# Patient Record
Sex: FEMALE | Race: WHITE | NOT HISPANIC OR LATINO | Employment: UNEMPLOYED | URBAN - METROPOLITAN AREA
[De-identification: names, ages, dates, MRNs, and addresses within clinical notes are randomized per-mention and may not be internally consistent; named-entity substitution may affect disease eponyms.]

---

## 2017-01-03 ENCOUNTER — GENERIC CONVERSION - ENCOUNTER (OUTPATIENT)
Dept: OTHER | Facility: OTHER | Age: 77
End: 2017-01-03

## 2017-01-04 ENCOUNTER — ALLSCRIPTS OFFICE VISIT (OUTPATIENT)
Dept: OTHER | Facility: OTHER | Age: 77
End: 2017-01-04

## 2017-01-12 ENCOUNTER — GENERIC CONVERSION - ENCOUNTER (OUTPATIENT)
Dept: OTHER | Facility: OTHER | Age: 77
End: 2017-01-12

## 2017-01-12 DIAGNOSIS — I77.1 STRICTURE OF ARTERY (HCC): ICD-10-CM

## 2017-01-12 DIAGNOSIS — E11.9 TYPE 2 DIABETES MELLITUS WITHOUT COMPLICATIONS (HCC): ICD-10-CM

## 2017-01-12 DIAGNOSIS — I65.23 OCCLUSION AND STENOSIS OF BILATERAL CAROTID ARTERIES: ICD-10-CM

## 2017-01-23 ENCOUNTER — TRANSCRIBE ORDERS (OUTPATIENT)
Dept: RADIOLOGY | Facility: HOSPITAL | Age: 77
End: 2017-01-23

## 2017-01-23 ENCOUNTER — APPOINTMENT (OUTPATIENT)
Dept: PREADMISSION TESTING | Facility: HOSPITAL | Age: 77
DRG: 038 | End: 2017-01-23
Payer: COMMERCIAL

## 2017-01-23 ENCOUNTER — TRANSCRIBE ORDERS (OUTPATIENT)
Dept: LAB | Facility: HOSPITAL | Age: 77
End: 2017-01-23

## 2017-01-23 ENCOUNTER — HOSPITAL ENCOUNTER (OUTPATIENT)
Dept: RADIOLOGY | Facility: HOSPITAL | Age: 77
Discharge: HOME | End: 2017-01-23
Attending: SURGERY
Payer: COMMERCIAL

## 2017-01-23 DIAGNOSIS — I65.23 OCCLUSION AND STENOSIS OF BILATERAL CAROTID ARTERIES: ICD-10-CM

## 2017-01-23 DIAGNOSIS — I65.23 BILATERAL CAROTID ARTERY OCCLUSION: Primary | ICD-10-CM

## 2017-01-23 DIAGNOSIS — I65.23 BILATERAL CAROTID ARTERY OCCLUSION: ICD-10-CM

## 2017-01-23 DIAGNOSIS — I77.1 STRICTURE OF ARTERY (HCC): ICD-10-CM

## 2017-01-23 DIAGNOSIS — E11.9 TYPE 2 DIABETES MELLITUS WITHOUT COMPLICATIONS (HCC): ICD-10-CM

## 2017-01-23 LAB
ALBUMIN SERPL BCP-MCNC: 3.5 G/DL (ref 3.5–5)
ALP SERPL-CCNC: 91 U/L (ref 46–116)
ALT SERPL W P-5'-P-CCNC: 18 U/L (ref 12–78)
ANION GAP SERPL CALCULATED.3IONS-SCNC: 5 MMOL/L (ref 4–13)
AST SERPL W P-5'-P-CCNC: 9 U/L (ref 5–45)
ATRIAL RATE: 53 BPM
ATRIAL RATE: 56 BPM
BASOPHILS # BLD AUTO: 0.04 THOUSANDS/ΜL (ref 0–0.1)
BASOPHILS NFR BLD AUTO: 0 % (ref 0–1)
BILIRUB SERPL-MCNC: 0.48 MG/DL (ref 0.2–1)
BUN SERPL-MCNC: 33 MG/DL (ref 5–25)
CALCIUM SERPL-MCNC: 9 MG/DL (ref 8.3–10.1)
CHLORIDE SERPL-SCNC: 108 MMOL/L (ref 100–108)
CO2 SERPL-SCNC: 28 MMOL/L (ref 21–32)
CREAT SERPL-MCNC: 1.36 MG/DL (ref 0.6–1.3)
EOSINOPHIL # BLD AUTO: 0.4 THOUSAND/ΜL (ref 0–0.61)
EOSINOPHIL NFR BLD AUTO: 4 % (ref 0–6)
ERYTHROCYTE [DISTWIDTH] IN BLOOD BY AUTOMATED COUNT: 14.4 % (ref 11.6–15.1)
EST. AVERAGE GLUCOSE BLD GHB EST-MCNC: 163 MG/DL
GFR SERPL CREATININE-BSD FRML MDRD: 37.7 ML/MIN/1.73SQ M
GLUCOSE SERPL-MCNC: 112 MG/DL (ref 65–140)
HBA1C MFR BLD: 7.3 % (ref 4.2–6.3)
HCT VFR BLD AUTO: 35.4 % (ref 34.8–46.1)
HGB BLD-MCNC: 11.8 G/DL (ref 11.5–15.4)
INR PPP: 1.01 (ref 0.86–1.16)
LYMPHOCYTES # BLD AUTO: 2.84 THOUSANDS/ΜL (ref 0.6–4.47)
LYMPHOCYTES NFR BLD AUTO: 32 % (ref 14–44)
MCH RBC QN AUTO: 30.2 PG (ref 26.8–34.3)
MCHC RBC AUTO-ENTMCNC: 33.3 G/DL (ref 31.4–37.4)
MCV RBC AUTO: 91 FL (ref 82–98)
MONOCYTES # BLD AUTO: 0.45 THOUSAND/ΜL (ref 0.17–1.22)
MONOCYTES NFR BLD AUTO: 5 % (ref 4–12)
NEUTROPHILS # BLD AUTO: 5.28 THOUSANDS/ΜL (ref 1.85–7.62)
NEUTS SEG NFR BLD AUTO: 59 % (ref 43–75)
NRBC BLD AUTO-RTO: 0 /100 WBCS
P AXIS: 40 DEGREES
P AXIS: 45 DEGREES
PLATELET # BLD AUTO: 190 THOUSANDS/UL (ref 149–390)
PMV BLD AUTO: 10.5 FL (ref 8.9–12.7)
POTASSIUM SERPL-SCNC: 4.3 MMOL/L (ref 3.5–5.3)
PR INTERVAL: 158 MS
PR INTERVAL: 158 MS
PROT SERPL-MCNC: 7.3 G/DL (ref 6.4–8.2)
PROTHROMBIN TIME: 13.4 SECONDS (ref 12–14.3)
QRS AXIS: 39 DEGREES
QRS AXIS: 39 DEGREES
QRSD INTERVAL: 90 MS
QRSD INTERVAL: 90 MS
QT INTERVAL: 444 MS
QT INTERVAL: 454 MS
QTC INTERVAL: 416 MS
QTC INTERVAL: 438 MS
RBC # BLD AUTO: 3.91 MILLION/UL (ref 3.81–5.12)
SODIUM SERPL-SCNC: 141 MMOL/L (ref 136–145)
T WAVE AXIS: 28 DEGREES
T WAVE AXIS: 36 DEGREES
VENTRICULAR RATE: 53 BPM
VENTRICULAR RATE: 56 BPM
WBC # BLD AUTO: 9.02 THOUSAND/UL (ref 4.31–10.16)

## 2017-01-23 PROCEDURE — 71020 HB CHEST X-RAY 2VW FRONTAL&LATL: CPT

## 2017-01-23 PROCEDURE — 36415 COLL VENOUS BLD VENIPUNCTURE: CPT

## 2017-01-23 PROCEDURE — 93005 ELECTROCARDIOGRAM TRACING: CPT

## 2017-01-23 PROCEDURE — 85610 PROTHROMBIN TIME: CPT

## 2017-01-23 PROCEDURE — 83036 HEMOGLOBIN GLYCOSYLATED A1C: CPT

## 2017-01-23 PROCEDURE — 80053 COMPREHEN METABOLIC PANEL: CPT

## 2017-01-23 PROCEDURE — 85025 COMPLETE CBC W/AUTO DIFF WBC: CPT

## 2017-01-25 ENCOUNTER — ANESTHESIA EVENT (OUTPATIENT)
Dept: PERIOP | Facility: HOSPITAL | Age: 77
DRG: 038 | End: 2017-01-25
Payer: COMMERCIAL

## 2017-01-27 ENCOUNTER — GENERIC CONVERSION - ENCOUNTER (OUTPATIENT)
Dept: OTHER | Facility: OTHER | Age: 77
End: 2017-01-27

## 2017-01-27 ENCOUNTER — APPOINTMENT (INPATIENT)
Dept: RADIOLOGY | Facility: HOSPITAL | Age: 77
DRG: 038 | End: 2017-01-27
Payer: COMMERCIAL

## 2017-01-27 ENCOUNTER — ANESTHESIA (OUTPATIENT)
Dept: PERIOP | Facility: HOSPITAL | Age: 77
DRG: 038 | End: 2017-01-27
Payer: COMMERCIAL

## 2017-01-27 ENCOUNTER — HOSPITAL ENCOUNTER (OUTPATIENT)
Dept: RADIOLOGY | Facility: HOSPITAL | Age: 77
Discharge: HOME | DRG: 038 | End: 2017-01-27
Attending: SURGERY
Payer: COMMERCIAL

## 2017-01-27 ENCOUNTER — HOSPITAL ENCOUNTER (INPATIENT)
Facility: HOSPITAL | Age: 77
LOS: 5 days | Discharge: HOME | DRG: 038 | End: 2017-02-01
Attending: SURGERY | Admitting: SURGERY
Payer: COMMERCIAL

## 2017-01-27 ENCOUNTER — APPOINTMENT (OUTPATIENT)
Dept: RADIOLOGY | Facility: HOSPITAL | Age: 77
DRG: 038 | End: 2017-01-27
Payer: COMMERCIAL

## 2017-01-27 DIAGNOSIS — I25.9 CARDIAC ISCHEMIA: Primary | ICD-10-CM

## 2017-01-27 DIAGNOSIS — I65.23 BILATERAL CAROTID ARTERY OCCLUSION: ICD-10-CM

## 2017-01-27 DIAGNOSIS — I77.1 STRICTURE OF ARTERY (HCC): ICD-10-CM

## 2017-01-27 DIAGNOSIS — I65.21 STENOSIS OF RIGHT INTERNAL CAROTID ARTERY: ICD-10-CM

## 2017-01-27 LAB
ABO GROUP BLD: NORMAL
BLD GP AB SCN SERPL QL: NEGATIVE
GLUCOSE SERPL-MCNC: 132 MG/DL (ref 65–140)
GLUCOSE SERPL-MCNC: 135 MG/DL (ref 65–140)
KCT BLD-ACNC: 218 SEC (ref 89–137)
KCT BLD-ACNC: 229 SEC (ref 89–137)
KCT BLD-ACNC: 229 SEC (ref 89–137)
KCT BLD-ACNC: 246 SEC (ref 89–137)
KCT BLD-ACNC: 257 SEC (ref 89–137)
KCT BLD-ACNC: 269 SEC (ref 89–137)
RH BLD: POSITIVE
SPECIMEN SOURCE: ABNORMAL
TROPONIN I SERPL-MCNC: 0.41 NG/ML
TROPONIN I SERPL-MCNC: <0.02 NG/ML

## 2017-01-27 PROCEDURE — 82948 REAGENT STRIP/BLOOD GLUCOSE: CPT

## 2017-01-27 PROCEDURE — C1769 GUIDE WIRE: HCPCS | Performed by: SURGERY

## 2017-01-27 PROCEDURE — C1725 CATH, TRANSLUMIN NON-LASER: HCPCS | Performed by: SURGERY

## 2017-01-27 PROCEDURE — A9270 NON-COVERED ITEM OR SERVICE: HCPCS | Performed by: PHYSICIAN ASSISTANT

## 2017-01-27 PROCEDURE — 93005 ELECTROCARDIOGRAM TRACING: CPT | Performed by: ANESTHESIOLOGY

## 2017-01-27 PROCEDURE — 86850 RBC ANTIBODY SCREEN: CPT | Performed by: SURGERY

## 2017-01-27 PROCEDURE — 03HY32Z INSERTION OF MONITORING DEVICE INTO UPPER ARTERY, PERCUTANEOUS APPROACH: ICD-10-PCS | Performed by: SURGERY

## 2017-01-27 PROCEDURE — 75710 ARTERY X-RAYS ARM/LEG: CPT

## 2017-01-27 PROCEDURE — 4A133J1 MONITORING OF ARTERIAL PULSE, PERIPHERAL, PERCUTANEOUS APPROACH: ICD-10-PCS | Performed by: SURGERY

## 2017-01-27 PROCEDURE — A9270 NON-COVERED ITEM OR SERVICE: HCPCS | Performed by: SURGERY

## 2017-01-27 PROCEDURE — 71010 HB CHEST X-RAY 1 VIEW FRONTAL (PORTABLE): CPT

## 2017-01-27 PROCEDURE — 03720D6: ICD-10-PCS | Performed by: SURGERY

## 2017-01-27 PROCEDURE — 86900 BLOOD TYPING SEROLOGIC ABO: CPT | Performed by: SURGERY

## 2017-01-27 PROCEDURE — 93882 EXTRACRANIAL UNI/LTD STUDY: CPT

## 2017-01-27 PROCEDURE — 37236 OPEN/PERQ PLACE STENT 1ST: CPT

## 2017-01-27 PROCEDURE — C1781 MESH (IMPLANTABLE): HCPCS | Performed by: SURGERY

## 2017-01-27 PROCEDURE — A9270 NON-COVERED ITEM OR SERVICE: HCPCS | Performed by: ANESTHESIOLOGY

## 2017-01-27 PROCEDURE — 85347 COAGULATION TIME ACTIVATED: CPT

## 2017-01-27 PROCEDURE — C1894 INTRO/SHEATH, NON-LASER: HCPCS | Performed by: SURGERY

## 2017-01-27 PROCEDURE — 84484 ASSAY OF TROPONIN QUANT: CPT | Performed by: SURGERY

## 2017-01-27 PROCEDURE — C1876 STENT, NON-COA/NON-COV W/DEL: HCPCS | Performed by: SURGERY

## 2017-01-27 PROCEDURE — 86901 BLOOD TYPING SEROLOGIC RH(D): CPT | Performed by: SURGERY

## 2017-01-27 PROCEDURE — 03CH0Z6 EXTIRPATE MATTER FROM R COM CAROTID, BIFURC, OPEN: ICD-10-PCS | Performed by: SURGERY

## 2017-01-27 PROCEDURE — 03U20JZ SUPPLEMENT INNOMINATE ARTERY WITH SYNTHETIC SUBSTITUTE, OPEN APPROACH: ICD-10-PCS | Performed by: SURGERY

## 2017-01-27 PROCEDURE — 4A133B1 MONITORING OF ARTERIAL PRESSURE, PERIPHERAL, PERCUTANEOUS APPROACH: ICD-10-PCS | Performed by: SURGERY

## 2017-01-27 PROCEDURE — 36216 PLACE CATHETER IN ARTERY: CPT

## 2017-01-27 DEVICE — XENOSURE BIOLOGIC PATCH, 0.8CM X 8CM, EIFU
Type: IMPLANTABLE DEVICE | Site: CAROTID | Status: FUNCTIONAL
Brand: XENOSURE BIOLOGIC PATCH

## 2017-01-27 DEVICE — IMPLANTABLE DEVICE
Type: IMPLANTABLE DEVICE | Site: ARTERIAL | Status: FUNCTIONAL
Brand: PALMAZ GENESIS TRANSHEPATIC BILIARY STENT ON OPTA PRO .035" DELIVERY SYSTEM

## 2017-01-27 RX ORDER — ROCURONIUM BROMIDE 10 MG/ML
INJECTION, SOLUTION INTRAVENOUS AS NEEDED
Status: DISCONTINUED | OUTPATIENT
Start: 2017-01-27 | End: 2017-01-27 | Stop reason: SURG

## 2017-01-27 RX ORDER — PROPOFOL 10 MG/ML
INJECTION, EMULSION INTRAVENOUS AS NEEDED
Status: DISCONTINUED | OUTPATIENT
Start: 2017-01-27 | End: 2017-01-27 | Stop reason: SURG

## 2017-01-27 RX ORDER — ALBUMIN, HUMAN INJ 5% 5 %
12.5 SOLUTION INTRAVENOUS ONCE
Status: COMPLETED | OUTPATIENT
Start: 2017-01-27 | End: 2017-01-28

## 2017-01-27 RX ORDER — ONDANSETRON 2 MG/ML
4 INJECTION INTRAMUSCULAR; INTRAVENOUS ONCE
Status: DISCONTINUED | OUTPATIENT
Start: 2017-01-27 | End: 2017-01-27 | Stop reason: HOSPADM

## 2017-01-27 RX ORDER — SODIUM CHLORIDE 9 MG/ML
100 INJECTION, SOLUTION INTRAVENOUS CONTINUOUS
Status: DISCONTINUED | OUTPATIENT
Start: 2017-01-27 | End: 2017-01-28

## 2017-01-27 RX ORDER — MORPHINE SULFATE 2 MG/ML
2 INJECTION, SOLUTION INTRAMUSCULAR; INTRAVENOUS EVERY 4 HOURS PRN
Status: DISCONTINUED | OUTPATIENT
Start: 2017-01-27 | End: 2017-02-01 | Stop reason: HOSPADM

## 2017-01-27 RX ORDER — CHLORHEXIDINE GLUCONATE 0.12 MG/ML
15 RINSE ORAL ONCE
Status: COMPLETED | OUTPATIENT
Start: 2017-01-27 | End: 2017-01-27

## 2017-01-27 RX ORDER — PROTAMINE SULFATE 10 MG/ML
INJECTION, SOLUTION INTRAVENOUS AS NEEDED
Status: DISCONTINUED | OUTPATIENT
Start: 2017-01-27 | End: 2017-01-27 | Stop reason: SURG

## 2017-01-27 RX ORDER — EPHEDRINE SULFATE 50 MG/ML
INJECTION, SOLUTION INTRAVENOUS AS NEEDED
Status: DISCONTINUED | OUTPATIENT
Start: 2017-01-27 | End: 2017-01-27 | Stop reason: SURG

## 2017-01-27 RX ORDER — GLYCOPYRROLATE 0.2 MG/ML
INJECTION INTRAMUSCULAR; INTRAVENOUS AS NEEDED
Status: DISCONTINUED | OUTPATIENT
Start: 2017-01-27 | End: 2017-01-27 | Stop reason: SURG

## 2017-01-27 RX ORDER — BUPIVACAINE HYDROCHLORIDE AND EPINEPHRINE 5; 5 MG/ML; UG/ML
INJECTION, SOLUTION PERINEURAL AS NEEDED
Status: DISCONTINUED | OUTPATIENT
Start: 2017-01-27 | End: 2017-01-27 | Stop reason: HOSPADM

## 2017-01-27 RX ORDER — ONDANSETRON 2 MG/ML
4 INJECTION INTRAMUSCULAR; INTRAVENOUS EVERY 6 HOURS PRN
Status: DISCONTINUED | OUTPATIENT
Start: 2017-01-27 | End: 2017-02-01 | Stop reason: HOSPADM

## 2017-01-27 RX ORDER — FENTANYL CITRATE 50 UG/ML
INJECTION, SOLUTION INTRAMUSCULAR; INTRAVENOUS AS NEEDED
Status: DISCONTINUED | OUTPATIENT
Start: 2017-01-27 | End: 2017-01-27 | Stop reason: SURG

## 2017-01-27 RX ORDER — ACETAMINOPHEN 325 MG/1
650 TABLET ORAL EVERY 6 HOURS PRN
COMMUNITY

## 2017-01-27 RX ORDER — LABETALOL HYDROCHLORIDE 5 MG/ML
5 INJECTION, SOLUTION INTRAVENOUS
Status: DISCONTINUED | OUTPATIENT
Start: 2017-01-27 | End: 2017-01-31

## 2017-01-27 RX ORDER — SODIUM CHLORIDE, SODIUM LACTATE, POTASSIUM CHLORIDE, CALCIUM CHLORIDE 600; 310; 30; 20 MG/100ML; MG/100ML; MG/100ML; MG/100ML
100 INJECTION, SOLUTION INTRAVENOUS CONTINUOUS
Status: DISCONTINUED | OUTPATIENT
Start: 2017-01-27 | End: 2017-01-27

## 2017-01-27 RX ORDER — NITROGLYCERIN 0.4 MG/1
0.4 TABLET SUBLINGUAL
Status: DISCONTINUED | OUTPATIENT
Start: 2017-01-27 | End: 2017-02-01 | Stop reason: HOSPADM

## 2017-01-27 RX ORDER — OXYCODONE HYDROCHLORIDE 5 MG/1
5 TABLET ORAL EVERY 4 HOURS PRN
Status: DISCONTINUED | OUTPATIENT
Start: 2017-01-27 | End: 2017-02-01 | Stop reason: HOSPADM

## 2017-01-27 RX ORDER — LIDOCAINE HYDROCHLORIDE 10 MG/ML
INJECTION, SOLUTION INFILTRATION; PERINEURAL AS NEEDED
Status: DISCONTINUED | OUTPATIENT
Start: 2017-01-27 | End: 2017-01-27 | Stop reason: HOSPADM

## 2017-01-27 RX ORDER — FENTANYL CITRATE/PF 50 MCG/ML
25 SYRINGE (ML) INJECTION
Status: DISCONTINUED | OUTPATIENT
Start: 2017-01-27 | End: 2017-01-27 | Stop reason: HOSPADM

## 2017-01-27 RX ORDER — ASPIRIN 81 MG/1
81 TABLET, CHEWABLE ORAL DAILY
Status: DISCONTINUED | OUTPATIENT
Start: 2017-01-28 | End: 2017-02-01 | Stop reason: HOSPADM

## 2017-01-27 RX ORDER — LIDOCAINE HYDROCHLORIDE 10 MG/ML
INJECTION, SOLUTION INFILTRATION; PERINEURAL AS NEEDED
Status: DISCONTINUED | OUTPATIENT
Start: 2017-01-27 | End: 2017-01-27 | Stop reason: SURG

## 2017-01-27 RX ORDER — ONDANSETRON 2 MG/ML
INJECTION INTRAMUSCULAR; INTRAVENOUS AS NEEDED
Status: DISCONTINUED | OUTPATIENT
Start: 2017-01-27 | End: 2017-01-27 | Stop reason: SURG

## 2017-01-27 RX ORDER — ACETAMINOPHEN 325 MG/1
650 TABLET ORAL EVERY 6 HOURS PRN
Status: DISCONTINUED | OUTPATIENT
Start: 2017-01-27 | End: 2017-02-01 | Stop reason: HOSPADM

## 2017-01-27 RX ORDER — HEPARIN SODIUM 1000 [USP'U]/ML
INJECTION, SOLUTION INTRAVENOUS; SUBCUTANEOUS AS NEEDED
Status: DISCONTINUED | OUTPATIENT
Start: 2017-01-27 | End: 2017-01-27 | Stop reason: SURG

## 2017-01-27 RX ORDER — CLOPIDOGREL BISULFATE 75 MG/1
75 TABLET ORAL DAILY
Status: DISCONTINUED | OUTPATIENT
Start: 2017-01-28 | End: 2017-02-01 | Stop reason: HOSPADM

## 2017-01-27 RX ORDER — ATORVASTATIN CALCIUM 20 MG/1
20 TABLET, FILM COATED ORAL EVERY EVENING
Status: DISCONTINUED | OUTPATIENT
Start: 2017-01-27 | End: 2017-02-01 | Stop reason: HOSPADM

## 2017-01-27 RX ORDER — ASPIRIN 81 MG/1
81 TABLET, CHEWABLE ORAL DAILY
Status: DISCONTINUED | OUTPATIENT
Start: 2017-01-28 | End: 2017-01-27 | Stop reason: SDUPTHER

## 2017-01-27 RX ORDER — ASPIRIN 325 MG
325 TABLET ORAL ONCE
Status: COMPLETED | OUTPATIENT
Start: 2017-01-27 | End: 2017-01-27

## 2017-01-27 RX ORDER — METOPROLOL SUCCINATE 100 MG/1
200 TABLET, EXTENDED RELEASE ORAL EVERY MORNING
Status: DISCONTINUED | OUTPATIENT
Start: 2017-01-28 | End: 2017-02-01 | Stop reason: HOSPADM

## 2017-01-27 RX ORDER — SODIUM CHLORIDE 9 MG/ML
INJECTION, SOLUTION INTRAVENOUS CONTINUOUS PRN
Status: DISCONTINUED | OUTPATIENT
Start: 2017-01-27 | End: 2017-01-27 | Stop reason: SURG

## 2017-01-27 RX ORDER — HYDRALAZINE HYDROCHLORIDE 20 MG/ML
15 INJECTION INTRAMUSCULAR; INTRAVENOUS
Status: DISCONTINUED | OUTPATIENT
Start: 2017-01-27 | End: 2017-01-31

## 2017-01-27 RX ORDER — DOCUSATE SODIUM 100 MG/1
100 CAPSULE, LIQUID FILLED ORAL 2 TIMES DAILY
Status: DISCONTINUED | OUTPATIENT
Start: 2017-01-27 | End: 2017-02-01 | Stop reason: HOSPADM

## 2017-01-27 RX ORDER — SODIUM CHLORIDE 450 MG/100ML
75 INJECTION, SOLUTION INTRAVENOUS CONTINUOUS
Status: DISCONTINUED | OUTPATIENT
Start: 2017-01-27 | End: 2017-01-27

## 2017-01-27 RX ORDER — NITROGLYCERIN 0.4 MG/1
0.4 TABLET SUBLINGUAL
COMMUNITY

## 2017-01-27 RX ORDER — ALLOPURINOL 300 MG/1
300 TABLET ORAL DAILY
Status: DISCONTINUED | OUTPATIENT
Start: 2017-01-28 | End: 2017-02-01 | Stop reason: HOSPADM

## 2017-01-27 RX ADMIN — SODIUM CHLORIDE, SODIUM LACTATE, POTASSIUM CHLORIDE, AND CALCIUM CHLORIDE 100 ML/HR: .6; .31; .03; .02 INJECTION, SOLUTION INTRAVENOUS at 10:27

## 2017-01-27 RX ADMIN — LIDOCAINE HYDROCHLORIDE 80 ML: 10 INJECTION, SOLUTION INFILTRATION; PERINEURAL at 11:52

## 2017-01-27 RX ADMIN — HEPARIN SODIUM 2000 UNITS: 1000 INJECTION INTRAVENOUS; SUBCUTANEOUS at 13:35

## 2017-01-27 RX ADMIN — SODIUM CHLORIDE 100 ML/HR: 0.9 INJECTION, SOLUTION INTRAVENOUS at 18:47

## 2017-01-27 RX ADMIN — EPHEDRINE SULFATE 10 MG: 50 INJECTION, SOLUTION INTRAMUSCULAR; INTRAVENOUS; SUBCUTANEOUS at 12:10

## 2017-01-27 RX ADMIN — NOREPINEPHRINE BITARTRATE 4 MCG/KG/MIN: 1 INJECTION, SOLUTION, CONCENTRATE INTRAVENOUS at 13:33

## 2017-01-27 RX ADMIN — OXYCODONE HYDROCHLORIDE 5 MG: 5 TABLET ORAL at 22:57

## 2017-01-27 RX ADMIN — FENTANYL CITRATE 50 MCG: 50 INJECTION, SOLUTION INTRAMUSCULAR; INTRAVENOUS at 13:00

## 2017-01-27 RX ADMIN — ONDANSETRON 4 MG: 2 INJECTION INTRAMUSCULAR; INTRAVENOUS at 15:36

## 2017-01-27 RX ADMIN — CEFAZOLIN SODIUM 2000 MG: 2 SOLUTION INTRAVENOUS at 12:40

## 2017-01-27 RX ADMIN — ATORVASTATIN CALCIUM 20 MG: 20 TABLET, FILM COATED ORAL at 20:54

## 2017-01-27 RX ADMIN — PROPOFOL 170 MG: 10 INJECTION, EMULSION INTRAVENOUS at 11:53

## 2017-01-27 RX ADMIN — DOCUSATE SODIUM 100 MG: 100 CAPSULE, LIQUID FILLED ORAL at 20:54

## 2017-01-27 RX ADMIN — HEPARIN SODIUM 5000 UNITS: 1000 INJECTION INTRAVENOUS; SUBCUTANEOUS at 13:11

## 2017-01-27 RX ADMIN — HEPARIN SODIUM 2000 UNITS: 1000 INJECTION INTRAVENOUS; SUBCUTANEOUS at 14:13

## 2017-01-27 RX ADMIN — HYDROMORPHONE HYDROCHLORIDE 0.4 MG: 1 INJECTION, SOLUTION INTRAMUSCULAR; INTRAVENOUS; SUBCUTANEOUS at 17:55

## 2017-01-27 RX ADMIN — GLYCOPYRROLATE 0.8 MG: 0.2 INJECTION INTRAMUSCULAR; INTRAVENOUS at 15:48

## 2017-01-27 RX ADMIN — HEPARIN SODIUM 2000 UNITS: 1000 INJECTION INTRAVENOUS; SUBCUTANEOUS at 13:22

## 2017-01-27 RX ADMIN — CHLORHEXIDINE GLUCONATE 15 ML: 1.2 RINSE ORAL at 09:45

## 2017-01-27 RX ADMIN — HEPARIN SODIUM 2000 UNITS: 1000 INJECTION INTRAVENOUS; SUBCUTANEOUS at 14:00

## 2017-01-27 RX ADMIN — SODIUM CHLORIDE: 0.9 INJECTION, SOLUTION INTRAVENOUS at 12:10

## 2017-01-27 RX ADMIN — ASPIRIN 325 MG: 325 TABLET ORAL at 18:20

## 2017-01-27 RX ADMIN — ALBUMIN HUMAN 12.5 G: 0.05 INJECTION, SOLUTION INTRAVENOUS at 16:30

## 2017-01-27 RX ADMIN — HYDROMORPHONE HYDROCHLORIDE 0.4 MG: 1 INJECTION, SOLUTION INTRAMUSCULAR; INTRAVENOUS; SUBCUTANEOUS at 18:45

## 2017-01-27 RX ADMIN — SODIUM CHLORIDE, SODIUM LACTATE, POTASSIUM CHLORIDE, AND CALCIUM CHLORIDE: .6; .31; .03; .02 INJECTION, SOLUTION INTRAVENOUS at 13:46

## 2017-01-27 RX ADMIN — HEPARIN SODIUM 2000 UNITS: 1000 INJECTION INTRAVENOUS; SUBCUTANEOUS at 13:48

## 2017-01-27 RX ADMIN — NEOSTIGMINE METHYLSULFATE 4 MG: 1 INJECTION INTRAMUSCULAR; INTRAVENOUS; SUBCUTANEOUS at 15:48

## 2017-01-27 RX ADMIN — FENTANYL CITRATE 50 MCG: 50 INJECTION, SOLUTION INTRAMUSCULAR; INTRAVENOUS at 15:41

## 2017-01-27 RX ADMIN — ROCURONIUM BROMIDE 70 MG: 10 INJECTION, SOLUTION INTRAVENOUS at 11:54

## 2017-01-27 RX ADMIN — SODIUM CHLORIDE 100 ML/HR: 0.9 INJECTION, SOLUTION INTRAVENOUS at 20:36

## 2017-01-27 RX ADMIN — ROCURONIUM BROMIDE 20 MG: 10 INJECTION, SOLUTION INTRAVENOUS at 14:37

## 2017-01-27 RX ADMIN — CEFAZOLIN SODIUM 1000 MG: 1 SOLUTION INTRAVENOUS at 22:49

## 2017-01-27 RX ADMIN — PROTAMINE SULFATE 75 MG: 10 INJECTION, SOLUTION INTRAVENOUS at 15:26

## 2017-01-27 RX ADMIN — FENTANYL CITRATE 50 MCG: 50 INJECTION, SOLUTION INTRAMUSCULAR; INTRAVENOUS at 11:51

## 2017-01-27 RX ADMIN — ALBUMIN HUMAN 12.5 G: 0.05 INJECTION, SOLUTION INTRAVENOUS at 16:50

## 2017-01-27 NOTE — DISCHARGE INSTRUCTIONS
DISCHARGE INSTRUCTIONS                       CAROTID ENDARTERECTOMY  Following discharge from the hospital, you may have some questions about your operation, your activities or your general condition  These instructions may answer some of your questions and help you adjust during the first few weeks following your operation  ACTIVITY: Resume your normal activities and exercise schedule as tolerated  If you were driving prior to surgery, you may resume driving one week following discharge from the hospital  You may ride in a car upon discharge  DIET: Resume your normal diet  Try to eat low fat and low cholesterol foods  RECURRENT SYMPTOMS: If you develop any new numbness, weakness, blindness or difficulty with speech after discharge call 911 or go to an emergency department immediately  INCISION: Your surgeon may have chosen to use a type of adhesive glue to close your incision  The glue is used to cover the incision, assist in closure, and prevent contamination  This adhesive will darken and peel away on its own within one to two weeks  You may shower the day after your surgery, but do not scrub the incision  If you do not have this adhesive glue, you may include the operated area in a shower on the third day following surgery  It is normal to have swelling or discoloration around the incision  If increasing redness or pain develops, call our office immediately  Numbness in the region of the incision may occur following the surgery  This normally resolves in six to twelve months  FOLLOW UP STUDIES: Doppler ultrasound studies are very important to your post-operative care  Your surgeon will arrange them at your first postoperative visit  The first study is due 3 months after surgery        Jacqui [address]: 2/9/17 at 3:45pm, Phyllis Mckeon office  [address], [address] [place] [address]    [phone] Angi Stone [address] [place] [address] , [address], [place], [address]  [address], [place], [address]  [address], [place], [address]  611 [facility], [facility], [address] [facility] [address]  [address]  [address] Lak[address] 62, 1st  Floor, Jacque Allred 34  [facility] 19, [address] [facility], [address], [facility] 77, [facility] 97   1201 [facility], 8614 [facility], [place], [address]  One [facility], 532 [facility], [facility],[address], Sergio Kern 6

## 2017-01-27 NOTE — IP AVS SNAPSHOT
[address]  [place], 123  Arelis Shelby  [phone]           AFTER VISIT SUMMARY             Carlene Gilman   67 yrs Female (1940)    MRN:  [id number]            Medications    It is important that you maintain an up-to-date list of medications (prescribed and over-the-counter, as well as vitamins and mineral supplements) that you are taking  Bring your list of current medications whenever you seek treatment at a hospital or other healthcare setting  If you have any questions or concerns, contact your primary care physician's office                     Your Medications      START taking these medications     aspirin 81 mg chewable tablet   Chew 1 tablet daily for 30 days   Last time this was given:  81 mg on 2/1/2017  8:35 AM   Refills:  0   Next Dose Due:  02/02/2017 AM              CONTINUE taking these medications     acetaminophen 325 mg tablet   Take 650 mg by mouth every 6 (six) hours as needed for mild pain   Last time this was given:  650 mg on 1/30/2017 10:06 PM   Refills:  0   Commonly known as:  TYLENOL   Next Dose Due:  As needed for pain            allopurinol 300 mg tablet   Take 300 mg by mouth daily   Last time this was given:  300 mg on 2/1/2017  8:35 AM   Refills:  0   Commonly known as:  ZYLOPRIM   Next Dose Due:  02/02/2017 AM            atorvastatin 20 mg tablet   Take 20 mg by mouth every evening   Last time this was given:  20 mg on 1/31/2017  5:07 PM   Refills:  0   Commonly known as:  LIPITOR   Next Dose Due:  02/01/2017 evening            clopidogrel 75 mg tablet   Take 75 mg by mouth daily   Last time this was given:  75 mg on 2/1/2017  8:34 AM   Refills:  0   Commonly known as:  PLAVIX   Next Dose Due:  02/02/2017 AM            metoprolol succinate 200 MG 24 hr tablet   Take 200 mg by mouth every morning   Last time this was given:  200 mg on 2/1/2017  8:34 AM   Refills:  0   Commonly known as:  TOPROL-XL   Next Dose Due: 02/02/2017 AM            nitroglycerin 0 4 mg SL tablet   Place 0 4 mg under the tongue every 5 (five) minutes as needed for chest pain   Refills:  0   Commonly known as:  NITROSTAT   Next Dose Due:  As needed for chest pain              STOP taking these medications     lisinopril-hydrochlorothiazide 20-25 MG per tablet   Commonly known as:  PRINZIDSIOMARAZESTORETIC                Where to Get Your Medications      You can get these medications from any pharmacy     You don't need a prescription for these medications     aspirin 81 mg chewable tablet                  Your Medications      Your Medication List           Morning Noon Evening Bedtime As Needed    acetaminophen 325 mg tablet   Take 650 mg by mouth every 6 (six) hours as needed for mild pain   Last time this was given:  650 mg on 1/30/2017 10:06 PM   Commonly known as:  TYLENOL   Next Dose Due:  As needed for pain                                    allopurinol 300 mg tablet   Take 300 mg by mouth daily   Last time this was given:  300 mg on 2/1/2017  8:35 AM   Commonly known as:  ZYLOPRIM   Next Dose Due:  02/02/2017 AM                                    aspirin 81 mg chewable tablet   Chew 1 tablet daily for 30 days   Last time this was given:  81 mg on 2/1/2017  8:35 AM   Next Dose Due:  02/02/2017 AM                                    atorvastatin 20 mg tablet   Take 20 mg by mouth every evening   Last time this was given:  20 mg on 1/31/2017  5:07 PM   Commonly known as:  LIPITOR   Next Dose Due:  02/01/2017 evening                                    clopidogrel 75 mg tablet   Take 75 mg by mouth daily   Last time this was given:  75 mg on 2/1/2017  8:34 AM   Commonly known as:  PLAVIX   Next Dose Due:  02/02/2017 AM                                    metoprolol succinate 200 MG 24 hr tablet   Take 200 mg by mouth every morning   Last time this was given:  200 mg on 2/1/2017  8:34 AM   Commonly known as:  TOPROL-XL   Next Dose Due:  02/02/2017 AM nitroglycerin 0 4 mg SL tablet   Place 0 4 mg under the tongue every 5 (five) minutes as needed for chest pain   Commonly known as:  NITROSTAT   Next Dose Due:  As needed for chest pain                                             Summary of Your Hospitalization        About your hospitalization     You were [facility] on:  January 27, 2017 [facility]    You were discharged on:  February 1, 2017 Unit phone number:  [phone]      Reason for Hospitalization     Your primary diagnosis was:  Carotid Stenosis, Right    Your diagnoses also included:   Innominate Artery Stenosis, Demand Ischemia Of Myocardium, Hyperlipidemia, Hypertension, Pad (Peripheral Artery Disease), Ckd (Chronic Kidney Disease) Stage 3, Gfr 30-59 Ml/Min, Coronary Artery Disease, Non-St Elevation Myocardial Infarction (Nstemi), Type 2, New Onset A-Fib      Treatment Team     Provider Service Role Specialty Primary office phone    Lizzeth Dawson MD Vascular Surgery Attending Provider Vascular Surgery [phone]    Lizzeth Dawson MD Vascular Surgery Surgeon  Vascular Surgery [phone]    Maranda Barber DO Cardiology Consulting Physician  Cardiology [phone]    Yessica Perez DO Anesthesiology Consulting Physician  Anesthesiology [phone]    [address] Cardiology Cardiologist  Cardiology [phone]    Seng Carnes -- Patient Care Assistant  Medical Surgical Number not on file    Alfred Christina RN -- Registered Nurse  Medical Surgical Number not on file      Procedures This Visit      Echo complete with contrast if indicated  1 time imaging     Question:  Reason for exam?  Answer:  Cardiac Murmur        ECG 12 lead  Once     Question:  REASON FOR EXAM:  Answer:  nstemi        Initiate Respiratory Protocol  Continuous,   Status:  Canceled          ECG 12 lead  Once     Question:  REASON FOR EXAM:  Answer:  chest pressure        EKG RESULTS Surgery Information     ID Date/Time Status All Surgeons All Procedures Location          [id number] 2017 7700 MD Darryl Chiang PA-C Shelia Quail, MD CAROTID ENDARTERECTOMY WITH RETROGRADE AND INNOMINATE ARTERY STENTING [77746 (CPT®)] BE MAIN OR        Last Resulted Labs     Date/Time Component Value Lab Status    17 0603 WBC 6 54 Final result    17 0603 HGB 9 1 Final result    17 0603 HCT 28 2 Final result    17 0603  Final result    17 0603  Final result    17 0603 K 3 8 Final result    17 0603  Final result    17 0603 CO2 26 Final result    17 0603 BUN 26 Final result    17 0603 CREATININE 1 03 Final result    17 0603 GLUCOSE 114 Final result    17 0940 ALKPHOS 91 Final result    17 0940 ALT 18 Final result    17 0940 AST 9 Final result    17 0940 ALBUMIN 3 5 Final result    17 0940 BILITOT 0 48 Final result    17 0940 HGBA1C 7 3 Final result    17 0433 TROPONINI 1 42 Final result    17 0940 INR 1 01 Final result      Imaging Results     Xr Chest Portable    Result Date: 2017  Impression: Diminished lung volumes  Cardiomegaly and pulmonary edema  Limited evaluation of left lung base  Underlying consolidation/effusion is not excluded  Follow-up PA and lateral exam recommended when possible  Workstation performed: [id number]     Ir Upper Extremity / Intervention    Result Date: 2017  Impression: Impression: 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 successfully treated with 8mm stent placement using open common carotid artery approach Workstation performed: [id number]            Follow-ups for After Discharge        Follow-up Information     [facility]       Why:  Jacqui wilks/ Dr Miller Gettin17 at 3:45pm, [facility] office  [address] [place] [address], [address] [place] [address], [address] [place] [address] Street    Contact information:    1307 [facility]  [address] [employer] [facility]  [phone]          Follow up with Oz Gomez MD      Specialty:  Internal Medicine    Why:  Call for f/u appt    Contact information:    [address] [place] [address]          Follow up with Zander Avitia MD      Specialty:  Cardiology    Why:  Call for appt to be seen in 1-2 weeks  Holter monitor in 6-8 weeks  Contact information:    [address] [facility]  [address] [employer] [place]  [phone]        Your Allergies  Date Reviewed: 1/27/2017    Allergen Reactions    Other Rash    Pt states she is allergic to tape adhesive      POLST/Adv Directive          Most Recent Value    POLST  Patient does not have POLST - would not like information    Advance Directive  Patient does not have advance directive - would not like information    Mental Health Surrogate  No      Multiplicom     To access this document and other health information online, please visit www  [employer] to login or create a patient portal account  For questions about any pending test results, you may contact the ordering physician or your primary care provider  Instructions for After Discharge        Activity Instructions     Lifting restrictions       Weight restriction of 10 lbs  No driving until       As Instructed       No strenuous exercise           Shower Daily                 Diet Instructions     Discharge Diet       Discharge Diet:  Theraputic Lifestyle Changes               Discharge Instructions                  DISCHARGE INSTRUCTIONS                       CAROTID ENDARTERECTOMY  Following discharge from the hospital, you may have some questions about your operation, your activities or your general condition  These instructions may answer some of your questions and help you adjust during the first few weeks following your operation      ACTIVITY: Resume your normal activities and exercise schedule as tolerated  If you were driving prior to surgery, you may resume driving one week following discharge from the hospital  You may ride in a car upon discharge  DIET: Resume your normal diet  Try to eat low fat and low cholesterol foods  RECURRENT SYMPTOMS: If you develop any new numbness, weakness, blindness or difficulty with speech after discharge call 911 or go to an emergency department immediately  INCISION: Your surgeon may have chosen to use a type of adhesive glue to close your incision  The glue is used to cover the incision, assist in closure, and prevent contamination  This adhesive will darken and peel away on its own within one to two weeks  You may shower the day after your surgery, but do not scrub the incision  If you do not have this adhesive glue, you may include the operated area in a shower on the third day following surgery  It is normal to have swelling or discoloration around the incision  If increasing redness or pain develops, call our office immediately  Numbness in the region of the incision may occur following the surgery  This normally resolves in six to twelve months  FOLLOW UP STUDIES: Doppler ultrasound studies are very important to your post-operative care  Your surgeon will arrange them at your first postoperative visit  The first study is due 3 months after surgery  Appt w/ Dr Hardeep Villalba: 2/9/17 at 3:45pm, [place] office  500 [facility], [address] [place] [address], [address] [place], [place] [id number] [address] [phone]  [address] [place] [address] , [address] [facility], [address]  [address], [place], [address]  [address], [place], [address]  611 [facility], [address] [facility] [address]  Anjali Lakhani 62, 1st  Floor, Jacque Allred 34  [facility] 19, [address] [facility], [facility] [address] [facility] 97   1307 [facility], 8614 [facility], [address] [place] [address]  [address] [facility] [address], 532 [facility], [facility], [address], [place] [address]      Other Instructions     Call provider for:  redness, tenderness, or signs of infection (pain, swelling, redness, odor or green/yellow discharge around incision site)           Call provider for:  severe uncontrolled pain           Call provider for: active or persistent bleeding                   Discharge Weight          Most Recent Value    Weight  91 3 kg (201 lb 4 5 oz) filed at 01/29/2017 0600      Information on Stroke     You have been diagnosed with - or have a history of - stroke or TIA during your hospitalization  Review the following to reduce your risk of stroke:  Discharge Medications  Taking your medications as prescribed is one of the most vital aspects of reducing your risk for stroke  It is important to know the names of your medications, how they work, how much to take, and when to take them  You should take your medications at the same time every day  Do not stop your prescribed medications or begin taking over-the-counter or herbal medications without first speaking with your physician  Risk Factors for Stroke  High blood pressure - High blood pressure is the most important risk factor for stroke  People who have high blood pressure have more than half the lifetime risk of having stroke compared to those who consistently have an optimal blood pressure reading of 120/80  Tobacco Use - Tobacco use doubles the risk for another stroke  Stop smoking if you smoke  High cholesterol - Cholesterol or plaque build-up in the arteries can block normal blood flow to the brain and cause a stroke and increase risk of heart disease  Maintain healthy cholesterol levels  Diabetes - People with diabetes are up to 4 times as likely to have a stroke as someone who does not have the disease  Atrial fibrillation - Atrial fibrillation increases your stroke risk 5 times, so its important to work with a doctor to control it   Eat a healthy diet  maintaining a diet low in calories, saturated and trans fats and cholesterol helps manage both obesity and healthy cholesterol levels in the blood, which also reduces risk for stroke  Physical activity - Physical activity reduces stroke risk  A recent study showed that people who exercise five or more times per week are less likely to have another stroke  Increase your physical activity  Alcohol use - Some studies say that drinking more than 2 drinks per day may increase stroke risk by 50 percent  Other studies have indicated that one alcoholic beverage a day may lower a persons risk for stroke, provided that there is no other medical reason for avoiding alcohol  Talk with a doctor about alcohol use and how it can best be controlled to prevent another stroke  Importance of follow up - Seeing your doctor regularly is important, even if you are feeling better  Regular medical care can help you stay out of the hospital in the future  Warning Signs of Stroke  Use FAST to remember warning signs of stroke: Face - Ask the person to smile  Does one side of the face droop? Arms - Ask the person to raise both arms  Does one arm drift downward? Speech - Ask the person to repeat a simple phrase  Is their speech slurred or strange? Time - If you observe any of these signs, call 9-1-1 immediately  Symptoms of Stroke  Sudden numbness or weakness of face, arm or leg - especially on one side of the body  Sudden confusion, trouble speaking or understanding  Sudden trouble seeing in one or both eyes  Sudden trouble walking, dizziness, loss of balance or coordination  Sudden severe headache with no known cause  Educational Information     Venous Thromboembolism (VTE) / Deep Vein Thrombosis (DVT) Prevention   VTE/DVT is a disease caused by blood clots that form in veins  Blood clots can be serious and common in patients with risk factors  VTE/DVT may occur after discharge  To decrease risks, take anticoagulation medicine if ordered by your doctor    Report any calf pain, swelling or tenderness and/or shortness of breath to your doctor immediately  Smoking Cessation   If you smoke, use tobacco or nicotine, and/or are exposed to second hand smoke, you are encouraged to stop to improve your health  If you need help quitting, please talk to your health care provider or call:  · Katty Wilcox ([phone])  · [facility] ([phone])   · [address] [place] [address] ([phone])      If your symptoms return or if your condition unexpectedly worsens from the time of discharge, notify your doctor or go to the nearest emergency room  If you think you are experiencing a medical emergency, call 911  Readmission Risk Score     Eyad Cain is committed to ensuring a safe discharge plan that will allow you to continue your recovery at home  Your risk for readmission has been determined to be MEDIUM      To prevent readmission, please be sure to:   See your health care provider within 1 week of discharge   Follow your discharge instructions    Take your medication as prescribed   Call your health care provider with any questions or concerns

## 2017-01-27 NOTE — PROGRESS NOTES
Called to evaluate patient after her CEA  She was experiencing heavy chest pain  EKG was done which showed ST depressions in V4, V5, V6, and I  SBP was ranging from 90 - 105  Cardiology was here to evaluate  Bedside ECHO was done which showed good wall motion and normal EF  Dilaudid was given and chest pain started to raisa  Pt given 325 ASA  She had plavix and metoprolol this AM      Plan:   Admit to ICU for monitoring  No intervention at this time  F/u serial troponins  Pain control      Tino Pinedo, DO  General Surgery PGY1

## 2017-01-27 NOTE — PERIOPERATIVE NURSING NOTE
Pt assessment remains stable/ pt denies pain/ no nausea/ pt resting quietly in bed   Waiting for critical care bed placement

## 2017-01-27 NOTE — CONSULTS
Consultation -  Cardiac Electrophysiology  Rocío Hyman 67 y o  female MRN: [id number]  Unit/Bed#: OR POOL Encounter: [id number]      Assessment:  Principal Problem:    Carotid stenosis, right  Active Problems:    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chest discomfort  CABG  Hx AVR    Plan:  Start  mg daily tonight and 81 mg starting tomorrow am   Continue plavix, continue metoprolol as outpatient dose  She does not have ST elevations and a stat echocardiogram that I performed at the bedside showed normal left ventricular ejection fraction with no wall motion abnormalities  RV function was normal   Her pain is improved with one dose of Dilaudid  Given no significant wall motion abnormality in the setting of the EKG changes would manage this with medical therapy and symptom control  Blood pressures in the systolic  range  Therefore, would not recommend addition of nitroglycerin  Stat portable chest x-ray has been ordered  Probable demand ischemia secondary to blood pressure in the range of 80-90 mmHg in setting of known coronary artery disease  She is pain-free now  Recommended try to avoid hypotension  ICU setting appropriate for this patient trend troponins  History of Present Illness   Physician Requesting Consult: Tomi Franklin MD  Reason for Consult / Principal Problem:  Chest discomfort  HPI: Rocío Hyman is a 67y o  year old female who presents with  chest discomfort post left carotid endarterectomy  She is a history of coronary artery bypass grafting her coronary anatomy was reviewed  PMHX from outpatient chart:PMH/CAD/AS/AVR: CABG, 1996, vein graft to the circumflex OM1 and OM 3 and a vein graft to the right coronary  She had unstable angina 2003  The large vein graft to the circumflex was severely narrowed and started  One week later, the mid LAD was stented  NSTEMI, February 2007   Circumflex VG again stented 2011 with unstable angina; worsening AS noted; TAVR evaluation 2012 Poor distal access with diffuse vascular disease  Circumflex patent  HUP; open aortic valve replacement, #23 magna with excellent symptomatic response, no current recurrence of angina  Echocardiogram 2016 aortic valve excellent function, normal EF  Consults    Review of Systems: negative for fever, chills, nausea, vomiting, chest pain, currently resolved  She does get short of breath with hills at home  All other 12 point review of systems negative     Historical Information   Past Medical History   Diagnosis Date    Aortic valve stenosis     Aorto-iliac disease     Carotid stenosis     Cataract of right eye 10/25/2016    Cataract, left eye 10/18/2016    Chronic systolic CHF (congestive heart failure)     CKD (chronic kidney disease) stage 3, GFR 30-59 ml/min     Coronary artery disease     Gout     Hyperlipidemia     Hypertension     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Right    PAD (peripheral artery disease)     Renal artery stenosis      Right     Past Surgical History   Procedure Laterality Date    Abdominal aortic aneurysm repair      Pr remv cataract extracap,insert lens Left 10/18/2016     Procedure: OS EXTRACTION EXTRACAPSULAR CATARACT PHACO INTRAOCULAR LENS (IOL); Surgeon: Linzie Opitz, MD;  [facility];  Service: Ophthalmology    Pr remv cataract extracap,insert lens Right 10/25/2016     Procedure: OD EXTRACTION EXTRACAPSULAR CATARACT PHACO INTRAOCULAR LENS (IOL); Surgeon: Linzie Opitz, MD;  [facility];  Service: Ophthalmology    Cataract extraction      Vascular surgery      Hysterectomy      Coronary artery bypass graft      Coronary stent placement      Cystoscopy      Aortic valve replacement       History   Alcohol Use No     History   Drug Use No     History   Smoking Status    Former Smoker   Smokeless Tobacco    Never Used     Comment: quit 20 years ago     Family History: History reviewed  No pertinent family history   mother  of myocardial infarction     Meds/Allergies   current meds:   Current Facility-Administered Medications   Medication Dose Route Frequency    [START ON 1/28/2017] aspirin chewable tablet 81 mg  81 mg Oral Daily    aspirin tablet 325 mg  325 mg Oral Once    fentaNYL (SUBLIMAZE) injection 25 mcg  25 mcg Intravenous Q5 Min PRN    HYDROmorphone (DILAUDID) 1 mg/mL injection 0 4 mg  0 4 mg Intravenous Q10 Min PRN    lactated ringers infusion  100 mL/hr Intravenous Continuous    ondansetron (ZOFRAN) injection 4 mg  4 mg Intravenous Once    sodium chloride 0 45 % infusion  75 mL/hr Intravenous Continuous     Facility-Administered Medications Ordered in Other Encounters   Medication Dose Route Frequency    iodixanol (VISIPAQUE) 320 MG/ML injection 200 mL  200 mL Intravenous Once in imaging     Allergies   Allergen Reactions    Other Rash     Pt states she is allergic to tape adhesive       Objective   Vitals: Blood pressure (!) 86/31, pulse 74, temperature 98 4 °F (36 9 °C), temperature source Temporal, resp  rate 12, height 5' 3" (1 6 m), weight 92 5 kg (204 lb), SpO2 98 %  , Body mass index is 36 14 kg/(m^2)  , Orthostatic Blood Pressures         Most Recent Value    Blood Pressure  (!)  86/31 filed at 01/27/2017 1715            Intake/Output Summary (Last 24 hours) at 01/27/17 1812  Last data filed at 01/27/17 1618   Gross per 24 hour   Intake             2800 ml   Output              100 ml   Net             2700 ml       Invasive Devices     Peripheral Intravenous Line            Peripheral IV 01/20/17 Left Forearm 7 days    Peripheral IV 01/27/17 Left Hand less than 1 day          Arterial Line            Arterial Line 01/27/17 Left Radial less than 1 day                    Physical Exam:obese female    NAD     GEN: Joycelyn Gorman appears well, alert and oriented x 3, pleasant and cooperative   HEENT: pupils equal, round, and reactive to light; extraocular muscles intact  NECK: supple, no carotid bruits   HEART: regular rhythm, normal S1 and S2, no murmurs, clicks, gallops or rubs   LUNGS: clear to auscultation bilaterally; no wheezes, rales, or rhonchi   ABDOMEN: normal bowel sounds, soft, no tenderness, no distention  EXTREMITIES: peripheral pulses normal; no clubbing, cyanosis, or edema  NEURO: no focal findings   SKIN: normal without suspicious lesions on exposed skin    Lab Results:   Admission on 01/27/2017   Component Date Value    ABO Grouping 01/27/2017 A     Rh Factor 01/27/2017 Positive     Antibody Screen 01/27/2017 Negative     POC Glucose 01/27/2017 132     POC Glucose 01/27/2017 135     Activated Clotting Time,* 01/27/2017 218*    Specimen Type 01/27/2017 ARTERIAL     Activated Clotting Time,* 01/27/2017 229*    Specimen Type 01/27/2017 VENOUS     Activated Clotting Time,* 01/27/2017 257*    Specimen Type 01/27/2017 VENOUS     Activated Clotting Time,* 01/27/2017 229*    Specimen Type 01/27/2017 VENOUS     Activated Clotting Time,* 01/27/2017 246*    Specimen Type 01/27/2017 VENOUS     Activated Clotting Time,* 01/27/2017 269*    Specimen Type 01/27/2017 VENOUS        Imaging: I have personally reviewed pertinent reports  Xr Chest Pa & Lateral    Result Date: 1/24/2017  Narrative: CHEST INDICATION:  Preop for carotid surgery  COMPARISON:  November 13, 2013 VIEWS:  Frontal and lateral projections; 2 images FINDINGS:  Sternotomy wires noted  Coronary stent identified  Calcified granuloma left lower lobe  Cardiomediastinal silhouette appears unremarkable  The lungs are clear  No pneumothorax or pleural effusion  Visualized osseous structures appear within normal limits for the patient's age  Impression: No active pulmonary disease  Workstation performed: [id number]     Ir Upper Extremity / Intervention    Result Date: 1/27/2017  Narrative:     IR UPPER EXTREMITY / INTERVENTION Indication: Right innominate stenosis and the right internal carotid artery stenosis  Procedure:  The procedure was performed in the hybrid operating room under general anesthesia  The right common carotid artery and carotid bifurcation were exposed by Dr Nicole Cooley which will be dictated under separate operative report  The distal right common carotid artery was accessed with a 21-gauge needle directed towards the aortic arch and a 5-Anguillan micropuncture dilator was placed  A Adamson wire was advanced into the innominate artery over which a 6-Anguillan sheath was placed  Imaging of the chest was performed with attention to the innominate stenosis  A Bentson wire was advanced into the ascending aorta through a 5-Anguillan Kumpe catheter  The Kumpe was removed  There was no fluoroscopy on the sheath during the previous injection and the sheath was withdrawn into the common carotid artery and selective imaging was performed to ensure no consultation with sheath placement  The sheath was readvanced over its dilator and the Bentson was exchanged for a Adamson wire  The innominate stenosis was dilated using a 5 x 20 mm balloon and repeat arteriography was performed  The sheath was advanced across the stenosis into the ascending aorta  A 8 x 19 mm Swapna stent was advanced and placed in approximately position  The sheath was withdrawn and following repeat imaging to confirm positioning, the stent was deployed in standard fashion  Completion arteriography was performed through the sheath  The remainder the procedure will be described in the operative report  Cosurgeons: Dr Nicole Cooley and Dr Yakelin Becerra  Due to the complexity of the procedure, the need to simultaneously manipulate multiple intra arterial devices, and the elevated risk of atheroemboli, proper performance of the procedure required both a vascular surgeon and an interventional radiologist to be present  Fluoroscopy time: 7 6 MINUTES Images: 110 Contrast: 25 mL Visipaque 320  Findings: There is a high-grade stenosis at the origin of the innominate artery    Following 8mm stent placement, there is no residual stenosis  The right proximal and mid common carotid artery is normal   The proximal right subclavian artery is normal   There is retrograde flow in the right vertebral artery initially which was seen to be antegrade following stent placement  Impression: Impression: 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 successfully treated with 8mm stent placement using open common carotid artery approach Workstation performed: [id number]     Vas Carotid Limited Study    Result Date: 1/27/2017  Narrative:  [facility] REPORT [facility]INICAL: Indications:  Right Carotid disease w/o CVA [I65 29]  This is an intra operative exam  Operative History 1/27/2017 Right Standard (ICA, ECA and CCA) endarterectomy 1/1/2012 Aortic valve replacement 1/1/2012 Coronary artery angioplasty with stent 11/21/1996 CABGx2 Risk Factors The patient has history of HTN, hyperlipidemia, CAD and previous smoking (quit >10yrs ago)  Clinical Left Pressure:  130/72 mm Hg  FINDINGS:  Right        PSV  EDV (cm/s)  Prox  ICA    101          16  Dist CCA     158          26  Ext Carotid  140          15     CONCLUSION:  Impression  Intra-op evaluation shows no evidence of mural thrombus, intimal flap or significant residual disease in the endarterectomized carotid arteries  SIGNATURE: Electronically Signed by: Sophie Oh on 2017-01-27 04:53:18 PM      EKG:  Sinus rhythm, T-wave inversions V4 through V6, aVL and one  These are new from preoperative EKG    Counseling / Coordination of Care  Total floor / unit time spent today  60 minutes  Greater than 50% of total time was spent with the patient and / or family counseling and / or coordination of care  A description of the counseling / coordination of care: Leobardo Singh Personally performing echo at bedside, discussing situation with surgical team and PACU his staff reviewing old records  Leobardo Singh

## 2017-01-27 NOTE — CONSULTS
[address] George 67 y o  female MRN: [id number]  Unit/Bed#: OR [place] Encounter: [id number]    Physician Requesting Consult: Jossue Mason MD    Reason for Consultation / Chief Complaint: Postoperative chest pain following R  CEA with retrograde innominate stenting    History of Present Illness:  Dionna Fisher is a 67 y o  female who presents with postoperative chest pain  She underwent R  CEA with retrograde innominate stenting with vascular surgery/IR today for bilateral carotid artery disease and had no focal deficits upon awakening  While in PACU, she experienced heavy chest pain  An EKG at the time showed ST depressions in leads I, V4, V5, V6, and cardiology was consulted  Bedside echo was WNL  Trop negative  She was given   Home plavix, lopressor, and nitroglycerin were restarted  Pain improved with dilaudid  Currently, patient denies pain  History obtained from chart review and the patient  Past Medical History:  Past Medical History   Diagnosis Date    Aortic valve stenosis     Aorto-iliac disease     Carotid stenosis     Cataract of right eye 10/25/2016    Cataract, left eye 10/18/2016    Chronic systolic CHF (congestive heart failure)     CKD (chronic kidney disease) stage 3, GFR 30-59 ml/min     Coronary artery disease     Gout     Hyperlipidemia     Hypertension     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Right    PAD (peripheral artery disease)     Renal artery stenosis      Right       Past Surgical History:  Past Surgical History   Procedure Laterality Date    Abdominal aortic aneurysm repair      Pr remv cataract extracap,insert lens Left 10/18/2016     Procedure: OS EXTRACTION EXTRACAPSULAR CATARACT PHACO INTRAOCULAR LENS (IOL); Surgeon: Moe Abebe MD;  [facility];  Service: Ophthalmology    Pr remv cataract extracap,insert lens Right 10/25/2016     Procedure: OD EXTRACTION EXTRACAPSULAR CATARACT PHACO INTRAOCULAR LENS (IOL);   Surgeon: Joan Abraham MD;  [facility];  Service: Ophthalmology    Cataract extraction      Vascular surgery      Hysterectomy      Coronary artery bypass graft      Coronary stent placement      Cystoscopy      Aortic valve replacement         Past Family History:  History reviewed  No pertinent family history  Social History:  History   Smoking Status    Former Smoker   Smokeless Tobacco    Never Used     Comment: quit 20 years ago     History   Alcohol Use No     History   Drug Use No     Marital Status:     Home Medications:   Prior to Admission medications    Medication Sig Start Date End Date Taking? Authorizing Provider   allopurinol (ZYLOPRIM) 300 mg tablet Take 300 mg by mouth daily   Yes Historical Provider, MD   atorvastatin (LIPITOR) 20 mg tablet Take 20 mg by mouth every evening   Yes Historical Provider, MD   clopidogrel (PLAVIX) 75 mg tablet Take 75 mg by mouth daily   Yes Historical Provider, MD   lisinopril-hydrochlorothiazide (PRINZIDE,ZESTORETIC) 20-25 MG per tablet Take 1 tablet by mouth every morning   Yes Historical Provider, MD   metoprolol succinate (TOPROL-XL) 200 MG 24 hr tablet Take 200 mg by mouth every morning   Yes Historical Provider, MD   acetaminophen (TYLENOL) 325 mg tablet Take 650 mg by mouth every 6 (six) hours as needed for mild pain    Historical Provider, MD   nitroglycerin (NITROSTAT) 0 4 mg SL tablet Place 0 4 mg under the tongue every 5 (five) minutes as needed for chest pain    Historical Provider, MD       Inpatient Medications:  Scheduled Meds:  [START ON 1/28/2017] aspirin 81 mg Oral Daily   ondansetron 4 mg Intravenous Once     Continuous Infusions:  lactated ringers 100 mL/hr Last Rate: 100 mL/hr (01/27/17 1027)   sodium chloride 75 mL/hr    sodium chloride 100 mL/hr Last Rate: 100 mL/hr (01/27/17 1847)     PRN Meds:    fentaNYL 25 mcg Q5 Min PRN   HYDROmorphone 0 4 mg Q10 Min PRN       Allergies:   Allergies   Allergen Reactions    Other Rash Pt states she is allergic to tape adhesive       ROS:   Review of Systems   Constitutional: Negative  HENT: Negative  Eyes: Negative  Respiratory: Negative  Cardiovascular: Negative  Gastrointestinal: Negative  Endocrine: Negative  Genitourinary: Negative  Musculoskeletal: Negative  Skin: Negative  Allergic/Immunologic: Negative  Neurological: Negative  Hematological: Negative  Psychiatric/Behavioral: Negative  Vitals:  Vitals:    17 1800 17 1815 17 1830 17 1845   BP:       Pulse: 72 70 62 58   Resp:    Temp:    99 6 °F (37 6 °C)   TempSrc:       SpO2: 99% 100% 99% 100%   Weight:       Height:         Temperature:   Temp (24hrs), Av °F (37 2 °C), Min:98 4 °F (36 9 °C), Max:99 6 °F (37 6 °C)    Current Temperature: 99 6 °F (37 6 °C)    Weights:   IBW: 52 4 kg  Body mass index is 36 14 kg/(m^2)  Hemodynamic Monitoring:  N/A     Non-Invasive/Invasive Ventilation Settings:  Respiratory    Lab Data (Last 4 hours)    None         O2/Vent Data (Last 4 hours)    None              No results found for: PHART, KAJ1EIW, PO2ART, CRE4HOZ, M8SECONC, BEART, SOURCE  SpO2: SpO2: 95 %     Physical Exam:  Physical Exam   Constitutional: She is oriented to person, place, and time  She appears well-developed and well-nourished  HENT:   Head: Normocephalic and atraumatic  Eyes:   R  Pupil 4mm, L  pupil 3 mm   Neck: Normal range of motion  Cardiovascular: Normal rate and regular rhythm  Pulmonary/Chest: Effort normal    Abdominal: Soft  She exhibits no distension  There is no tenderness  Musculoskeletal: Normal range of motion  Bilateral motor upper lower extremities grossly intact and equal    Neurological: She is alert and oriented to person, place, and time  Skin: Skin is warm and dry         Labs:    Results from last 7 days  Lab Units 17  0940   WBC Thousand/uL 9 02   HEMOGLOBIN g/dL 11 8   HEMATOCRIT % 35 4   PLATELETS Thousands/uL 190   NEUTROS PCT % 59   MONOS PCT % 5      Results from last 7 days  Lab Units 01/23/17  0940   SODIUM mmol/L 141   POTASSIUM mmol/L 4 3   CHLORIDE mmol/L 108   CO2 mmol/L 28   BUN mg/dL 33*   CREATININE mg/dL 1 36*   CALCIUM mg/dL 9 0   TOTAL PROTEIN g/dL 7 3   BILIRUBIN TOTAL mg/dL 0 48   ALK PHOS U/L 91   ALT U/L 18   AST U/L 9   GLUCOSE RANDOM mg/dL 112                Results from last 7 days  Lab Units 01/23/17  0940   INR  1 01           0  Lab Value Date/Time   TROPONINI <0 02 01/27/2017 1812       Imaging: Ir Upper Extremity / Intervention    Result Date: 1/27/2017  Impression: Impression: 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 successfully treated with 8mm stent placement using open common carotid artery approach Workstation performed: [id number]      I have personally reviewed pertinent reports  and I have personally reviewed pertinent films in PACS  EKG: ST depression in V4, V5, V6, I  This was personally reviewed by myself  Micro:  No results found for: Sonia Rapp, [facility]    ______________________________________________________________________    Assessment and Plan:     Neuro:   - hx CVA with bilateral carotid artery disease  - pain control: oxycodone 5 Q4 PRN, morphine 2 Q4 PRN  - neurochecks Q4    CV:   - postoperative chest pain with EKG changes: likely 2/2 demand ischemia per [facility]  Continue ASA/plavix  Trend troponin  Telemetry  Pain control as above  - HLD: continue lipitor    Lung:   - Incentive spirometry, pulmonary toilet    GI:   - Cardiac diet    F/E/N:   - [email or web address] postoperatively  - D/c IVF when tolerating diet    :   - elevated creatinine this AM, continue to monitor  IVF hydration    ID:   - received ancef preoperatively  Monitor for fevers  Heme:   - SCDs for DVT ppx; start SQH when OK with vascular surgery    Endo:   - Hx DM2, no oral antihyperglycemic agents at home  Continue to monitor sugars   Start SSI if hyperglycemic  Msk/Skin:   - Incisions C/D/I    Disposition: ICU level of care for cardiac and neurovascular monitoring    Counseling / Coordination of Care  Total Critical Care time spent 45 minutes excluding procedures, teaching and family updates  ______________________________________________________________________    VTE Pharmacologic Prophylaxis: Sequential compression device (Venodyne)   VTE Mechanical Prophylaxis: sequential compression device    Invasive lines and devices: Invasive Devices     Peripheral Intravenous Line            Peripheral IV 01/20/17 Left Forearm 7 days    Peripheral IV 01/27/17 Left Hand less than 1 day          Arterial Line            Arterial Line 01/27/17 Left Radial less than 1 day                Code Status: No Order  POA:    POLST:      Given critical illness, patient length of stay will require greater than two midnights  Portions of the record may have been created with voice recognition software  Occasional wrong word or "sound a like" substitutions may have occurred due to the inherent limitations of voice recognition software  Read the chart carefully and recognize, using context, where substitutions have occurred        Alfredo Javier

## 2017-01-27 NOTE — OP NOTE
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    Procedure(s) (LRB):  CAROTID ENDARTERECTOMY WITH RETROGRADE AND INNOMINATE ARTERY STENTING (Right)    Specimen(s):  * No specimens in log *    Estimated Blood Loss:   100 mL    Drains:          Anesthesia Type:   General    Operative Indications:  Occlusion and stenosis of bilateral carotid arteries [I65 23]  Stricture of artery [I77 1]  Combination of critical stenosis of the right internal carotid artery as well as the innominate artery  Asymptomatic  Operative Findings:  Greater than 90% stenosis of the innominate artery successfully treated with 8 x 18 mm Swapna stent without significant residual stenosis  High-grade severely calcified plaque of the proximal internal carotid artery with extension to3 cm into the internal carotid artery  Duplex imaging following this endarterectomy showed wide patency of the common and internal carotid arteries without significant residual stenosis  Upon awakening the patient was awake, following commands and moving all 4 extremities  Complications:   None    Procedure and Technique:  Qualified surgical resident was not available for this case  Assistance was required to aid in retraction, dissection and arterial reconstruction  Intervention radiology also was present for the stenting segment of the procedure due to their expertise  Gen  endotracheal anesthesia and arterial line monitors were placed   The neck was extended and rotated to the left  Duplex ultrasound was used to identify the carotid bifurcation  The right neck was then prepped and draped in normal sterile fashion with ChloraPrep  A transverse incision was made overlying the carotid bifurcation  Bovie cautery was used to maintain careful hemostasis  The platysma was divided  The sternal carotid mass her muscles retracted posterior laterally  The jugular vein was identified  Any crossing veins were ligated with silk ties  The common carotid artery was identified and dissected free approximate 3 cm below its bifurcation  The vagus nerve was identified and preserved throughout the dissection  5000 units of IV heparin were Mr  Repeated bolusing was performed to attain an ACT of greater than 260  Dissection was carried up the common carotid artery to the carotid bifurcation  The external carotid and superior thyroidal arteries were encircled with Vesseloops  Dissection was carried up the internal carotid artery were significant plaque is identified in the posterior aspect of the artery approximately 3 cm above the bifurcation  The vas was encircled with Vesseloops above this disease  Of note at this point the hypoglossal nerve crossed to nearly joined the vagus nerve  These nerves were preserved throughout this dissection  At this time the common carotid artery was cannulated in a retrograde fashion and a 6 [place] sheath was positioned in the innominate artery  Retrograde imaging showed a critical stenosis of approximately 90% at the origin the innominate artery  This lesion was crossed with a Bentson wire was was exchanged for a Adamson wire which was positioned in the ascending aorta  Predilatation was performed with a 5 mm balloon  The sheath was then passed beyond the stenosis and a 8 x 18 Swapna stent was positioned across the lesion  The sheath was withdrawn  An image was obtained to confirm appropriate positioning and the stent was deployed  Completion imaging showed no significant residual stenosis  Of note the common carotid artery was occluded with the vessel loop at any point in which the innominate lesion was crossed or manipulated  At this time the carotid vessels were occluded with Vesseloops and the sheath was removed  An arteriotomy was created from the common carotid artery puncture site through the stenosis in the internal carotid artery  An 8 [place] shunt was easily passed into the internal carotid artery with good backbleeding  Shunt was then passed, carotid artery with restoration shunted flow  A standard endarterectomy was then performed with a good taper point in the internal carotid artery and a good endpoint in the common carotid artery  An eversion technique was used in the external carotid artery  The artery was irrigated with heparinized saline solution and any residual debris was removed  The arteriotomy was then closed with a bovine pericardial patch secured with running 6-0 Prolene suture  Once this closure was nearly completed the shunt was removed and all vessels were backbled and flushed properly  The anastomosis was completed and flow was restored first the external carotid artery then the internal carotid artery  A small bleeding point was repaired with a Prolene suture  An intraoperative duplex was then performed which showed wide patency the common and internal carotid arteries without evidence of residual stenosis or intraluminal pathology  The wound was irrigated with saline solution  Fibrillar was placed around the arterial repair site  Some continued oozing was identified  Half of the heparin dose was reversed with 75 mg of protamine  After a few minutes of manual compression no further bleeding points were encountered  Any subcutaneous bleeding was either coagulated or clipped  The wound was closed by reapproximating the platysma with a running 3-0 Monocryl suture and a 4-0 Monocryl subcuticular stitch  Local anesthetic was injected eric-incisional tissue  A hystocril dressing was placed  The patient was then awoken from anesthesia and transferred to recovery room in stable condition         I was present for the entire procedure    Patient Disposition:  PACU      Vascular Quality Initiative - Carotid Endarterectomy     Urgency:  Elective    Anesthesia: General Type: Conventional    Side: right    Patch Type: Bovine Pericardial     If Prosthetic, Patch : Britney    Shunt: Yes- Routine    Skin Prep: Chlorhexidine    Drain: no  Heparin: yes    Protamine: yes      Dextran: no     Re-explore artery after closure: no    Total Procedure time: 175min    Monitoring:   EEG: no   Stump Pressure: no   Other: no    Completion Study:   Doppler: no   Duplex: yes   Arteriogram: no    Concomitant Procedure:   Proximal Endovascular: yes   Distal Endovascular: no   CABG: no   Other Arterial Op: no      SIGNATURE: Leia Ponce MD  DATE: January 27, 2017  TIME: 4:22 PM

## 2017-01-27 NOTE — PROGRESS NOTES
Dr Rosy Razo here/ echo completed  Slight ischemia noted/ heart function overall good with EF 60%  Orders for troponin and 325mg ASA    Chest Pain diminishing

## 2017-01-27 NOTE — OR NURSING
Attempted to reach family in the Waiting Room  No one responded when asked if waiting for [facility]

## 2017-01-27 NOTE — PROGRESS NOTES
Pt complains of diffuse chest pressure  Dr Tami Arrieta paged/ EKG done  ST segment depression noted   Paged Cardiology and dr Remy Mare

## 2017-01-27 NOTE — PROGRESS NOTES
Dr Rea Lizet here to see pt  Chest pain diminishing   Dr Barbi Cheng group made aware and are here to see pt

## 2017-01-28 PROBLEM — I24.8 DEMAND ISCHEMIA OF MYOCARDIUM (HCC): Status: ACTIVE | Noted: 2017-01-28

## 2017-01-28 PROBLEM — I24.89 DEMAND ISCHEMIA OF MYOCARDIUM: Status: ACTIVE | Noted: 2017-01-28

## 2017-01-28 LAB
ANION GAP SERPL CALCULATED.3IONS-SCNC: 7 MMOL/L (ref 4–13)
ATRIAL RATE: 76 BPM
BUN SERPL-MCNC: 28 MG/DL (ref 5–25)
CALCIUM SERPL-MCNC: 7.8 MG/DL (ref 8.3–10.1)
CHLORIDE SERPL-SCNC: 110 MMOL/L (ref 100–108)
CO2 SERPL-SCNC: 24 MMOL/L (ref 21–32)
CREAT SERPL-MCNC: 1.26 MG/DL (ref 0.6–1.3)
ERYTHROCYTE [DISTWIDTH] IN BLOOD BY AUTOMATED COUNT: 14.6 % (ref 11.6–15.1)
GFR SERPL CREATININE-BSD FRML MDRD: 41.2 ML/MIN/1.73SQ M
GLUCOSE SERPL-MCNC: 112 MG/DL (ref 65–140)
HCT VFR BLD AUTO: 28.7 % (ref 34.8–46.1)
HGB BLD-MCNC: 9.4 G/DL (ref 11.5–15.4)
MCH RBC QN AUTO: 30.1 PG (ref 26.8–34.3)
MCHC RBC AUTO-ENTMCNC: 32.8 G/DL (ref 31.4–37.4)
MCV RBC AUTO: 92 FL (ref 82–98)
P AXIS: 47 DEGREES
PLATELET # BLD AUTO: 140 THOUSANDS/UL (ref 149–390)
PMV BLD AUTO: 10.4 FL (ref 8.9–12.7)
POTASSIUM SERPL-SCNC: 4.3 MMOL/L (ref 3.5–5.3)
PR INTERVAL: 158 MS
QRS AXIS: 51 DEGREES
QRSD INTERVAL: 104 MS
QT INTERVAL: 392 MS
QTC INTERVAL: 441 MS
RBC # BLD AUTO: 3.12 MILLION/UL (ref 3.81–5.12)
SODIUM SERPL-SCNC: 141 MMOL/L (ref 136–145)
T WAVE AXIS: 21 DEGREES
TROPONIN I SERPL-MCNC: 1.58 NG/ML
TROPONIN I SERPL-MCNC: 2.15 NG/ML
TROPONIN I SERPL-MCNC: 2.39 NG/ML
TROPONIN I SERPL-MCNC: 2.72 NG/ML
TROPONIN I SERPL-MCNC: 3.68 NG/ML
VENTRICULAR RATE: 76 BPM
WBC # BLD AUTO: 9.39 THOUSAND/UL (ref 4.31–10.16)

## 2017-01-28 PROCEDURE — A9270 NON-COVERED ITEM OR SERVICE: HCPCS | Performed by: PHYSICIAN ASSISTANT

## 2017-01-28 PROCEDURE — 80048 BASIC METABOLIC PNL TOTAL CA: CPT | Performed by: PHYSICIAN ASSISTANT

## 2017-01-28 PROCEDURE — 84484 ASSAY OF TROPONIN QUANT: CPT | Performed by: SURGERY

## 2017-01-28 PROCEDURE — 85027 COMPLETE CBC AUTOMATED: CPT | Performed by: PHYSICIAN ASSISTANT

## 2017-01-28 PROCEDURE — A9270 NON-COVERED ITEM OR SERVICE: HCPCS | Performed by: SURGERY

## 2017-01-28 PROCEDURE — 93005 ELECTROCARDIOGRAM TRACING: CPT | Performed by: INTERNAL MEDICINE

## 2017-01-28 PROCEDURE — 84484 ASSAY OF TROPONIN QUANT: CPT | Performed by: PHYSICIAN ASSISTANT

## 2017-01-28 RX ORDER — HEPARIN SODIUM 5000 [USP'U]/ML
5000 INJECTION, SOLUTION INTRAVENOUS; SUBCUTANEOUS EVERY 8 HOURS SCHEDULED
Status: DISCONTINUED | OUTPATIENT
Start: 2017-01-28 | End: 2017-02-01 | Stop reason: HOSPADM

## 2017-01-28 RX ADMIN — HEPARIN SODIUM 5000 UNITS: 5000 INJECTION, SOLUTION INTRAVENOUS; SUBCUTANEOUS at 14:33

## 2017-01-28 RX ADMIN — CLOPIDOGREL BISULFATE 75 MG: 75 TABLET ORAL at 08:20

## 2017-01-28 RX ADMIN — MORPHINE SULFATE 2 MG: 2 INJECTION, SOLUTION INTRAMUSCULAR; INTRAVENOUS at 20:05

## 2017-01-28 RX ADMIN — ALLOPURINOL 300 MG: 300 TABLET ORAL at 08:20

## 2017-01-28 RX ADMIN — HEPARIN SODIUM 5000 UNITS: 5000 INJECTION, SOLUTION INTRAVENOUS; SUBCUTANEOUS at 22:13

## 2017-01-28 RX ADMIN — DOCUSATE SODIUM 100 MG: 100 CAPSULE, LIQUID FILLED ORAL at 08:20

## 2017-01-28 RX ADMIN — OXYCODONE HYDROCHLORIDE 5 MG: 5 TABLET ORAL at 04:11

## 2017-01-28 RX ADMIN — OXYCODONE HYDROCHLORIDE 5 MG: 5 TABLET ORAL at 17:32

## 2017-01-28 RX ADMIN — ACETAMINOPHEN 650 MG: 325 TABLET, FILM COATED ORAL at 20:04

## 2017-01-28 RX ADMIN — METOPROLOL SUCCINATE 200 MG: 100 TABLET, EXTENDED RELEASE ORAL at 08:24

## 2017-01-28 RX ADMIN — CEFAZOLIN SODIUM 1000 MG: 1 SOLUTION INTRAVENOUS at 06:30

## 2017-01-28 RX ADMIN — ASPIRIN 81 MG 81 MG: 81 TABLET ORAL at 08:20

## 2017-01-28 RX ADMIN — ATORVASTATIN CALCIUM 20 MG: 20 TABLET, FILM COATED ORAL at 17:27

## 2017-01-28 RX ADMIN — DOCUSATE SODIUM 100 MG: 100 CAPSULE, LIQUID FILLED ORAL at 17:28

## 2017-01-28 NOTE — SOCIAL WORK
65yo female is POD#1 right CEA  She is alert and oriented, oob chair, independent ADLS, drives  She resides in [place] with her 2 sons Pierce De Leon and Tim  Her third son is : Hany Shah at [phone]  She lives in 3 story home with 4 EMPERATRIZ and bathroom on second floor  No DME, no hx inpt rehab  Has used [facility] in the past and would use again if needed, but does not feel she needs assistance at this time  She uses [facility] on [address]  Son will transport home at discharge, possibly tomorrow  Encouraged pt to ask questions of her health care team, know how to manage her health, know her meds, know symptoms to watch for  Informed of [address] and discharge miliunge

## 2017-01-28 NOTE — PLAN OF CARE
CARDIOVASCULAR - ADULT     Maintains optimal cardiac output and hemodynamic stability Progressing        INFECTION - ADULT     Absence or prevention of progression during hospitalization Progressing        NEUROSENSORY - ADULT     Achieves stable or improved neurological status Progressing        PAIN - ADULT     Verbalizes/displays adequate comfort level or baseline comfort level Progressing        Potential for Falls     Patient will remain free of falls Progressing        SKIN/TISSUE INTEGRITY - ADULT     Skin integrity remains intact Progressing     Incision(s), wounds(s) or drain site(s) healing without S/S of infection Progressing

## 2017-01-28 NOTE — PROGRESS NOTES
Progress Note - Vascular Surgery   Lottie Judd 67 y o  female MRN: [id number]  Unit/Bed#: [facility] 414-01 Encounter: [id number]      Subjective:  Patient doing well  Noted to have severe chest pain in PACU she now thinks that this was more musculoskeletal, hwoever she was noted to also have ST depressions  Echo obtained and cardiology consult  Last night no further ACS symptoms  Vitals:  Visit Vitals    BP (!) 86/31    Pulse 80    Temp 98 4 °F (36 9 °C) (Oral)    Resp 16    Ht 5' 3" (1 6 m)    Wt 96 5 kg (212 lb 11 9 oz)    SpO2 100%    BMI 37 69 kg/m2       I/Os:  I/O last 3 completed shifts: In: 9886 [P O :720;  I V :4120; IV Piggyback:50]  Out: 710 [Urine:610; Blood:100]       Lab Results and Cultures:   CBC with diff:   Lab Results   Component Value Date    WBC 9 39 01/28/2017    HGB 9 4 (L) 01/28/2017    HCT 28 7 (L) 01/28/2017    MCV 92 01/28/2017     (L) 01/28/2017    MCH 30 1 01/28/2017    MCHC 32 8 01/28/2017    RDW 14 6 01/28/2017    MPV 10 4 01/28/2017    NRBC 0 01/23/2017   ,   BMP/CMP:  Lab Results   Component Value Date     01/28/2017    K 4 3 01/28/2017     (H) 01/28/2017    CO2 24 01/28/2017    ANIONGAP 7 01/28/2017    BUN 28 (H) 01/28/2017    CREATININE 1 26 01/28/2017    GLUCOSE 112 01/28/2017    CALCIUM 7 8 (L) 01/28/2017    AST 9 01/23/2017    ALT 18 01/23/2017    ALKPHOS 91 01/23/2017    PROT 7 3 01/23/2017    ALBUMIN 3 5 01/23/2017    BILITOT 0 48 01/23/2017    EGFR 41 2 01/28/2017   ,   Lipid Panel: No results found for: CHOL,   Coags:   Lab Results   Component Value Date    INR 1 01 01/23/2017   ,     Blood Culture: No results found for: Rajni Garcia,   Urinalysis: No results found for: Anjali William, SPECGRAV, PHUR, LEUKOCYTESUR, NITRITE, PROTEINUA, GLUCOSEU, KETONESU, BILIRUBINUR, BLOODU,   Urine Culture: No results found for: URINECX,   Wound Culure: No results found for: WOUNDCULT    Medications:  Current Facility-Administered Medications   Medication Dose Route Frequency    acetaminophen (TYLENOL) tablet 650 mg  650 mg Oral Q6H PRN    allopurinol (ZYLOPRIM) tablet 300 mg  300 mg Oral Daily    aspirin chewable tablet 81 mg  81 mg Oral Daily    atorvastatin (LIPITOR) tablet 20 mg  20 mg Oral QPM    clopidogrel (PLAVIX) tablet 75 mg  75 mg Oral Daily    docusate sodium (COLACE) capsule 100 mg  100 mg Oral BID    labetalol (NORMODYNE) injection 5 mg  5 mg Intravenous Q15 Min PRN    And    hydrALAZINE (APRESOLINE) injection 15 mg  15 mg Intravenous Q15 Min PRN    And    niCARdipine (CARDENE) 25 mg (STANDARD CONCENTRATION) in sodium chloride 0 9% 250 mL  1-15 mg/hr Intravenous Continuous PRN    metoprolol succinate (TOPROL-XL) 24 hr tablet 200 mg  200 mg Oral QAM    morphine 2 mg/mL injection 2 mg  2 mg Intravenous Q4H PRN    nitroglycerin (NITROSTAT) SL tablet 0 4 mg  0 4 mg Sublingual Q5 Min PRN    ondansetron (ZOFRAN) injection 4 mg  4 mg Intravenous Q6H PRN    oxyCODONE (ROXICODONE) IR tablet 5 mg  5 mg Oral Q4H PRN       Imaging:      Physical Exam:    Physical Exam: General: AAOx3  Respiratory: BS b/l  Neck: trachea midline, incision c/d/i,   Neurological: following all commands, tongue midline  nonfocal pupils equal b/l and reactive now   Abdomen: Soft, NT, no rebound/guarding    Heart: RRR, S1s2  Ext: Warm no cyanosis         Assessment:  66 yo F with right carotid stenosis and right innominate artery stenosis s/p right carotid endarterectomy with right retrograde innominate artery stenting     Plan:  Cardiac diet   DC IVF   DC huang   Transfer out of unit   F/U cardiolgoy given increase in troponins   Start DVT ppx this morning   PT/OT     Oleg Ricci  1/28/2017

## 2017-01-28 NOTE — PROGRESS NOTES
Patient examined post operatively  Noted to have R pupil 4m and 3mm L  nonfocal otherwise  Movement of all extremities with equal strength  Case discussed with attending on call, Dr Yandel Chatman is to observe patient for now   Continue vascular exams

## 2017-01-28 NOTE — PROGRESS NOTES
Cardiology Progress Note - Chandrika Chacon 67 y o  female MRN: [id number]    Unit/Bed#: [facility] 414-01 Encounter: [id number]      Assessment:  Principal Problem:    Carotid stenosis, right  Active Problems:    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Demand ischemia of myocardium      Plan:  Patient is comfortable this morning  She has no chest pain or significant dyspnea  He is in sinus rhythm on telemetry with a rate in the 80s  He is postop day 1 after carotid endarterectomy  Her troponin elevation is consistent with a type II non-STEMI in the setting of hypotension  She is on standard anti-ischemic therapy at this point  Vital signs are acceptable  I have made no change in her medical regimen  Subjective:   Patient seen and examined  No significant events overnight   negative  Objective:     Vitals: Blood pressure (!) 145/44, pulse 79, temperature 98 4 °F (36 9 °C), temperature source Oral, resp  rate 22, height 5' 3" (1 6 m), weight 96 5 kg (212 lb 11 9 oz), SpO2 94 %  , Body mass index is 37 69 kg/(m^2)  , Orthostatic Blood Pressures         Most Recent Value    Blood Pressure  (!)  145/44 filed at 01/28/2017 0824      ,      Intake/Output Summary (Last 24 hours) at 01/28/17 0954  Last data filed at 01/28/17 0737   Gross per 24 hour   Intake             5690 ml   Output             1083 ml   Net             4607 ml       No significant arrhythmias seen on telemetry review         Physical Exam:    GEN: Chandrika Chacon appears well, alert and oriented x 3, pleasant and cooperative   HEART: normal rate, regular rhythm, normal S1 and S2, no murmurs, clicks, gallops or rubs   LUNGS: clear to auscultation bilaterally; no wheezes, rales, or rhonchi   ABDOMEN: normal bowel sounds, soft, no tenderness, no distention  EXTREMITIES: peripheral pulses normal; no clubbing, cyanosis, or edema  SKIN: warm and well perfused, no suspicious lesions on exposed skin    Labs & Results:    Admission on 01/27/2017   Component Date Value    ABO Grouping 01/27/2017 A     Rh Factor 01/27/2017 Positive     Antibody Screen 01/27/2017 Negative     POC Glucose 01/27/2017 132     POC Glucose 01/27/2017 135     Activated Clotting Time,* 01/27/2017 218*    Specimen Type 01/27/2017 ARTERIAL     Activated Clotting Time,* 01/27/2017 229*    Specimen Type 01/27/2017 VENOUS     Activated Clotting Time,* 01/27/2017 257*    Specimen Type 01/27/2017 VENOUS     Activated Clotting Time,* 01/27/2017 229*    Specimen Type 01/27/2017 VENOUS     Activated Clotting Time,* 01/27/2017 246*    Specimen Type 01/27/2017 VENOUS     Activated Clotting Time,* 01/27/2017 269*    Specimen Type 01/27/2017 VENOUS     Troponin I 01/27/2017 <0 02     Ventricular Rate 01/28/2017 76     Atrial Rate 01/28/2017 76     IL Interval 01/28/2017 158     QRSD Interval 01/28/2017 104     QT Interval 01/28/2017 392     QTC Interval 01/28/2017 441     P Axis 01/28/2017 47     QRS Axis 01/28/2017 51     T Wave Axis 01/28/2017 21     Troponin I 01/27/2017 0 41*    Troponin I 01/28/2017 1 58*    Sodium 01/28/2017 141     Potassium 01/28/2017 4 3     Chloride 01/28/2017 110*    CO2 01/28/2017 24     Anion Gap 01/28/2017 7     BUN 01/28/2017 28*    Creatinine 01/28/2017 1 26     Glucose 01/28/2017 112     Calcium 01/28/2017 7 8*    eGFR 01/28/2017 41 2     WBC 01/28/2017 9 39     RBC 01/28/2017 3 12*    Hemoglobin 01/28/2017 9 4*    Hematocrit 01/28/2017 28 7*    MCV 01/28/2017 92     MCH 01/28/2017 30 1     MCHC 01/28/2017 32 8     RDW 01/28/2017 14 6     Platelets 61/42/2445 140*    MPV 01/28/2017 10 4     Troponin I 01/28/2017 2 39*       Xr Chest Portable    Result Date: 1/27/2017  Narrative: CHEST INDICATION:  Chest pain and pressure  COMPARISON:  January 23, 2017 VIEWS:   AP frontal;  1 image FINDINGS:  Lungs are suboptimally aerated  There are interstitial and alveolar infiltrates in both lungs  Left lung base is obscured   Cardiac silhouette is enlarged  Mild vascular congestion and peribronchial thickening  Prior median sternotomy  Coronary stent  Bony structures otherwise grossly intact  Numerous EKG leads in place  Impression: Diminished lung volumes  Cardiomegaly and pulmonary edema  Limited evaluation of left lung base  Underlying consolidation/effusion is not excluded  Follow-up PA and lateral exam recommended when possible  Workstation performed: [id number]     Xr Chest Pa & Lateral    Result Date: 1/24/2017  Narrative: CHEST INDICATION:  Preop for carotid surgery  COMPARISON:  November 13, 2013 VIEWS:  Frontal and lateral projections; 2 images FINDINGS:  Sternotomy wires noted  Coronary stent identified  Calcified granuloma left lower lobe  Cardiomediastinal silhouette appears unremarkable  The lungs are clear  No pneumothorax or pleural effusion  Visualized osseous structures appear within normal limits for the patient's age  Impression: No active pulmonary disease  Workstation performed: [id number]     Ir Upper Extremity / Intervention    Result Date: 1/27/2017  Narrative:     IR UPPER EXTREMITY / INTERVENTION Indication: Right innominate stenosis and the right internal carotid artery stenosis  Procedure: The procedure was performed in the hybrid operating room under general anesthesia  The right common carotid artery and carotid bifurcation were exposed by Dr Steph Golden which will be dictated under separate operative report  The distal right common carotid artery was accessed with a 21-gauge needle directed towards the aortic arch and a 5-Citizen of the [place] micropuncture dilator was placed  A Adamson wire was advanced into the innominate artery over which a 6-Citizen of [place] sheath was placed  Imaging of the chest was performed with attention to the innominate stenosis  A Peyton wire was advanced into the ascending aorta through a 5-Citizen of the [place] Kumpe catheter  The Kumpe was removed    There was no fluoroscopy on the sheath during the previous injection and the sheath was withdrawn into the common carotid artery and selective imaging was performed to ensure no consultation with sheath placement  The sheath was readvanced over its dilator and the Bentson was exchanged for a Adamson wire  The innominate stenosis was dilated using a 5 x 20 mm balloon and repeat arteriography was performed  The sheath was advanced across the stenosis into the ascending aorta  A 8 x 19 mm Swapna stent was advanced and placed in approximately position  The sheath was withdrawn and following repeat imaging to confirm positioning, the stent was deployed in standard fashion  Completion arteriography was performed through the sheath  The remainder the procedure will be described in the operative report  Cosurgeons: Dr Kash Yeager and Dr Juan Davis  Due to the complexity of the procedure, the need to simultaneously manipulate multiple intra arterial devices, and the elevated risk of atheroemboli, proper performance of the procedure required both a vascular surgeon and an interventional radiologist to be present  Fluoroscopy time: 7 6 MINUTES Images: 110 Contrast: 25 mL Visipaque 320  Findings: There is a high-grade stenosis at the origin of the innominate artery  Following 8mm stent placement, there is no residual stenosis  The right proximal and mid common carotid artery is normal   The proximal right subclavian artery is normal   There is retrograde flow in the right vertebral artery initially which was seen to be antegrade following stent placement  Impression: Impression: 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 successfully treated with 8mm stent placement using open common carotid artery approach Workstation performed: [id number]     Vas Carotid Limited Study    Result Date: 1/27/2017  Narrative:  [facility] REPORT CLINICAL: Indications:  Right Carotid disease w/o CVA [I65 29]   This is an intra operative exam  Operative History 1/27/2017 Right Standard (ICA, ECA and CCA) endarterectomy 1/1/2012 Aortic valve replacement 1/1/2012 Coronary artery angioplasty with stent 11/21/1996 CABGx2 Risk Factors The patient has history of HTN, hyperlipidemia, CAD and previous smoking (quit >10yrs ago)  Clinical Left Pressure:  130/72 mm Hg  FINDINGS:  Right        PSV  EDV (cm/s)  Prox  ICA    101          16  Dist CCA     158          26  Ext Carotid  140          15     CONCLUSION:  Impression  Intra-op evaluation shows no evidence of mural thrombus, intimal flap or significant residual disease in the endarterectomized carotid arteries  SIGNATURE: Electronically Signed by: Satish Reed on 2017-01-27 04:53:18 PM      EKG personally reviewed by Louis To MD      Counseling / Coordination of Care  Total floor / unit time spent today 30 minutes  Greater than 50% of total time was spent with the patient and / or family counseling and / or coordination of care

## 2017-01-28 NOTE — PROGRESS NOTES
Progress Note - Critical Care   Carolina Vo 67 y o  female MRN: [id number]  Unit/Bed#: [facility] 414-01 Encounter: [id number]    Assessment:   Patient Active Problem List   Diagnosis    Cataract, left eye    Cataract of right eye    Carotid stenosis, right    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 Demand ischemia of myocardium         Plan: Critical Care Management as outlined below:     Neuro: Awake and alert  No focal neurologic deficits  Minimal anasocoria with 4 mm right pupil and 3 mm left pupil, both equally reactive  Required to p r n  doses of 5 mg oxycodone overnight for pain  States pain well controlled with medications  CV: Status post right CEA with retrograde innominate stenting, postop day #1  Episode of chest pain postoperatively with ST depressions on EKG  Seen by cardiology  Troponins are mildly elevated and continue to rise  Likely NSTEMI type 2  Cardiology continues to follow  On aspirin and Plavix currently  Continue to monitor closely  History of coronary artery disease with previous stenting and CABG  Lung: No significant events  Doing well on incentive spirometry  No shortness of breath  Lung sounds clear  Continue aggressive pulmonary toilet  GI: Tolerating cardiac step one diet  No acute abdominal issues  FEN: 4 2 L positive since admission  No significant peripheral edema  BMP unremarkable today  Discontinue maintenance IV fluids  : Good urine output  BMP unremarkable  Continue to monitor  ID: Afebrile  No leukocytosis  Continue to monitor  Heme: Acute blood loss anemia  Hemoglobin 9 4, down from 11 8  Mild thrombocytopenia    Continue aspirin and Plavix  Continue SCDs for DVT prophylaxis  Will likely start chemical prophylaxis later today  Endo: No acute issues  Blood glucose normal  Continue to monitor  Msk/Skin: No skin breakdown  Continue frequent repositioning and offloading  Out of bed to chair       Disposition: Continue ICU management for now     ______________________________________________________________________    Chief Complaint: Right neck fullness      HPI/24hr events: 50-year-old female admitted for elective right CEA with innominate artery stenting  Had episode of chest pain postoperatively with ST depressions  Mildly elevated troponins  Currently pain-free     ______________________________________________________________________    Physical Exam:   Physical Exam   Constitutional: She is oriented to person, place, and time  Vital signs are normal  She appears well-developed  She is cooperative  Non-toxic appearance  She does not appear ill  No distress  Eyes: Conjunctivae are normal  Pupils are equal, round, and reactive to light  Neck: No JVD present  No tracheal deviation present  Cardiovascular: Normal rate, regular rhythm and normal pulses  No extrasystoles are present  Pulmonary/Chest: Effort normal and breath sounds normal  No tachypnea  No respiratory distress  Abdominal: Soft  Normal appearance  She exhibits no distension  There is no tenderness  Neurological: She is alert and oriented to person, place, and time  She has normal strength  GCS eye subscore is 4  GCS verbal subscore is 5  GCS motor subscore is 6  Skin: Skin is warm, dry and intact              ______________________________________________________________________  Vitals:    17 0200 17 0300 17 0400 17 0500   BP:       Pulse: 72 78 88 88   Resp: 19 20 20 21   Temp:   98 4 °F (36 9 °C)    TempSrc:   Oral    SpO2: 100% 100% 100% 100%   Weight:       Height:         Arterial Line BP: 120/36  Arterial Line MAP (mmHg): 62 mmHg     Temperature:   Temp (24hrs), Av 6 °F (37 °C), Min:97 8 °F (36 6 °C), Max:99 6 °F (37 6 °C)    Current Temperature: 98 4 °F (36 9 °C)  Weights:   IBW: 52 4 kg    Body mass index is 37 69 kg/(m^2)    Weight (last 2 days)     Date/Time   Weight    17 1930  96 5 (212 74)    17 0954  92 5 (204) Hemodynamic Monitoring:  N/A     Non-Invasive/Invasive Ventilation Settings:  Respiratory    Lab Data (Last 4 hours)    None         O2/Vent Data (Last 4 hours)    None              No results found for: PHART, GIX4VHW, PO2ART, TAG7RTE, K6RCFFAE, BEART, SOURCE  SpO2: SpO2: 100 %, SpO2 Activity: SpO2 Activity: At Rest, SpO2 Device: O2 Device: Nasal cannula  Intake and Outputs:  I/O       01/26 0701 - 01/27 0700 01/27 0701 - 01/28 0700    P  O   720    I V  (mL/kg)  4120 (42 7)    IV Piggyback  50    Total Intake(mL/kg)  4890 (50 7)    Urine (mL/kg/hr)  610    Blood  100    Total Output   710    Net   +4180          Unmeasured Urine Occurrence  1 x        UOP: 0 7 mL/kg/hour   Nutrition:        Diet Orders            Start     Ordered    01/27/17 1959  Diet Cardiac; Cardiac Step 1  Diet effective now     Comments:  Once fully recovered  Nursing to do bedside swallow assessment   Question Answer Comment   Diet Type Cardiac    Cardiac Cardiac Step 1    RD to adjust diet per protocol? Yes        01/27/17 1958          Labs:     Results from last 7 days  Lab Units 01/23/17  0940   WBC Thousand/uL 9 02   HEMOGLOBIN g/dL 11 8   HEMATOCRIT % 35 4   PLATELETS Thousands/uL 190   NEUTROS PCT % 59   MONOS PCT % 5      Results from last 7 days  Lab Units 01/23/17  0940   SODIUM mmol/L 141   POTASSIUM mmol/L 4 3   CHLORIDE mmol/L 108   CO2 mmol/L 28   BUN mg/dL 33*   CREATININE mg/dL 1 36*   CALCIUM mg/dL 9 0   TOTAL PROTEIN g/dL 7 3   BILIRUBIN TOTAL mg/dL 0 48   ALK PHOS U/L 91   ALT U/L 18   AST U/L 9   GLUCOSE RANDOM mg/dL 112                Results from last 7 days  Lab Units 01/23/17  0940   INR  1 01           0  Lab Value Date/Time   TROPONINI 1 58 (H) 01/28/2017 0130   TROPONINI 0 41 (H) 01/27/2017 2116   TROPONINI <0 02 01/27/2017 1812     Imaging: None today  I have personally reviewed pertinent reports     and I have personally reviewed pertinent films in PACS    Micro:  No results found for: Chip Collar, Nateleidy Barker  Allergies: Allergies   Allergen Reactions    Other Rash     Pt states she is allergic to tape adhesive     Medications:   Scheduled Meds:  allopurinol 300 mg Oral Daily   aspirin 81 mg Oral Daily   atorvastatin 20 mg Oral QPM   cefazolin 1,000 mg Intravenous Q8H   clopidogrel 75 mg Oral Daily   docusate sodium 100 mg Oral BID   metoprolol succinate 200 mg Oral QAM     Continuous Infusions:  niCARdipine 1-15 mg/hr    sodium chloride 100 mL/hr Last Rate: 100 mL/hr (01/27/17 2036)     PRN Meds:    acetaminophen 650 mg Q6H PRN   labetalol 5 mg Q15 Min PRN   And     hydrALAZINE 15 mg Q15 Min PRN   And     niCARdipine 1-15 mg/hr Continuous PRN   morphine injection 2 mg Q4H PRN   nitroglycerin 0 4 mg Q5 Min PRN   ondansetron 4 mg Q6H PRN   oxyCODONE 5 mg Q4H PRN     VTE Pharmacologic Prophylaxis: Reason for no pharmacologic prophylaxis Large doses of heparin yesterday  VTE Mechanical Prophylaxis: sequential compression device  Invasive lines and devices: Invasive Devices     Peripheral Intravenous Line            Peripheral IV 01/20/17 Left Forearm 7 days    Peripheral IV 01/27/17 Left Hand less than 1 day          Arterial Line            Arterial Line 01/27/17 Left Radial less than 1 day                   Counseling / Coordination of Care  Total Critical Care time spent 37 minutes excluding procedures, teaching and family updates  Code Status: No Order    Portions of the record may have been created with voice recognition software  Occasional wrong word or "sound a like" substitutions may have occurred due to the inherent limitations of voice recognition software  Read the chart carefully and recognize, using context, where substitutions have occurred      Kendra Garcia PA-C

## 2017-01-29 PROBLEM — I21.A1 NON-ST ELEVATION MYOCARDIAL INFARCTION (NSTEMI), TYPE 2: Status: ACTIVE | Noted: 2017-01-29

## 2017-01-29 LAB
ANION GAP SERPL CALCULATED.3IONS-SCNC: 10 MMOL/L (ref 4–13)
BASOPHILS # BLD AUTO: 0.02 THOUSANDS/ΜL (ref 0–0.1)
BASOPHILS NFR BLD AUTO: 0 % (ref 0–1)
BUN SERPL-MCNC: 26 MG/DL (ref 5–25)
CALCIUM SERPL-MCNC: 8 MG/DL (ref 8.3–10.1)
CHLORIDE SERPL-SCNC: 105 MMOL/L (ref 100–108)
CO2 SERPL-SCNC: 22 MMOL/L (ref 21–32)
CREAT SERPL-MCNC: 1.32 MG/DL (ref 0.6–1.3)
EOSINOPHIL # BLD AUTO: 0.24 THOUSAND/ΜL (ref 0–0.61)
EOSINOPHIL NFR BLD AUTO: 2 % (ref 0–6)
ERYTHROCYTE [DISTWIDTH] IN BLOOD BY AUTOMATED COUNT: 14.5 % (ref 11.6–15.1)
GFR SERPL CREATININE-BSD FRML MDRD: 39 ML/MIN/1.73SQ M
GLUCOSE SERPL-MCNC: 118 MG/DL (ref 65–140)
HCT VFR BLD AUTO: 28.9 % (ref 34.8–46.1)
HGB BLD-MCNC: 9.5 G/DL (ref 11.5–15.4)
LYMPHOCYTES # BLD AUTO: 1.85 THOUSANDS/ΜL (ref 0.6–4.47)
LYMPHOCYTES NFR BLD AUTO: 16 % (ref 14–44)
MAGNESIUM SERPL-MCNC: 1.2 MG/DL (ref 1.6–2.6)
MAGNESIUM SERPL-MCNC: 3 MG/DL (ref 1.6–2.6)
MCH RBC QN AUTO: 30.4 PG (ref 26.8–34.3)
MCHC RBC AUTO-ENTMCNC: 32.9 G/DL (ref 31.4–37.4)
MCV RBC AUTO: 93 FL (ref 82–98)
MONOCYTES # BLD AUTO: 0.93 THOUSAND/ΜL (ref 0.17–1.22)
MONOCYTES NFR BLD AUTO: 8 % (ref 4–12)
NEUTROPHILS # BLD AUTO: 8.63 THOUSANDS/ΜL (ref 1.85–7.62)
NEUTS SEG NFR BLD AUTO: 74 % (ref 43–75)
NRBC BLD AUTO-RTO: 0 /100 WBCS
PLATELET # BLD AUTO: 142 THOUSANDS/UL (ref 149–390)
PMV BLD AUTO: 10.7 FL (ref 8.9–12.7)
POTASSIUM SERPL-SCNC: 4.1 MMOL/L (ref 3.5–5.3)
RBC # BLD AUTO: 3.12 MILLION/UL (ref 3.81–5.12)
SODIUM SERPL-SCNC: 137 MMOL/L (ref 136–145)
TROPONIN I SERPL-MCNC: 1.42 NG/ML
WBC # BLD AUTO: 11.7 THOUSAND/UL (ref 4.31–10.16)

## 2017-01-29 PROCEDURE — A9270 NON-COVERED ITEM OR SERVICE: HCPCS | Performed by: PHYSICIAN ASSISTANT

## 2017-01-29 PROCEDURE — 84484 ASSAY OF TROPONIN QUANT: CPT | Performed by: PHYSICIAN ASSISTANT

## 2017-01-29 PROCEDURE — 83735 ASSAY OF MAGNESIUM: CPT | Performed by: PHYSICIAN ASSISTANT

## 2017-01-29 PROCEDURE — 85025 COMPLETE CBC W/AUTO DIFF WBC: CPT | Performed by: PHYSICIAN ASSISTANT

## 2017-01-29 PROCEDURE — 83735 ASSAY OF MAGNESIUM: CPT | Performed by: NURSE PRACTITIONER

## 2017-01-29 PROCEDURE — A9270 NON-COVERED ITEM OR SERVICE: HCPCS | Performed by: SURGERY

## 2017-01-29 PROCEDURE — 80048 BASIC METABOLIC PNL TOTAL CA: CPT | Performed by: PHYSICIAN ASSISTANT

## 2017-01-29 RX ORDER — MAGNESIUM SULFATE HEPTAHYDRATE 40 MG/ML
4 INJECTION, SOLUTION INTRAVENOUS ONCE
Status: COMPLETED | OUTPATIENT
Start: 2017-01-29 | End: 2017-01-29

## 2017-01-29 RX ORDER — MAGNESIUM SULFATE HEPTAHYDRATE 40 MG/ML
2 INJECTION, SOLUTION INTRAVENOUS ONCE
Status: COMPLETED | OUTPATIENT
Start: 2017-01-29 | End: 2017-01-29

## 2017-01-29 RX ORDER — MAGNESIUM SULFATE HEPTAHYDRATE 40 MG/ML
INJECTION, SOLUTION INTRAVENOUS
Status: COMPLETED
Start: 2017-01-29 | End: 2017-01-29

## 2017-01-29 RX ORDER — SENNOSIDES 8.6 MG
1 TABLET ORAL
Status: DISCONTINUED | OUTPATIENT
Start: 2017-01-29 | End: 2017-02-01 | Stop reason: HOSPADM

## 2017-01-29 RX ADMIN — MAGNESIUM SULFATE HEPTAHYDRATE 4 G: 40 INJECTION, SOLUTION INTRAVENOUS at 06:42

## 2017-01-29 RX ADMIN — DOCUSATE SODIUM 100 MG: 100 CAPSULE, LIQUID FILLED ORAL at 08:34

## 2017-01-29 RX ADMIN — ALLOPURINOL 300 MG: 300 TABLET ORAL at 08:34

## 2017-01-29 RX ADMIN — ASPIRIN 81 MG 81 MG: 81 TABLET ORAL at 08:34

## 2017-01-29 RX ADMIN — CLOPIDOGREL BISULFATE 75 MG: 75 TABLET ORAL at 08:34

## 2017-01-29 RX ADMIN — DOCUSATE SODIUM 100 MG: 100 CAPSULE, LIQUID FILLED ORAL at 17:58

## 2017-01-29 RX ADMIN — METOPROLOL SUCCINATE 200 MG: 100 TABLET, EXTENDED RELEASE ORAL at 08:36

## 2017-01-29 RX ADMIN — HEPARIN SODIUM 5000 UNITS: 5000 INJECTION, SOLUTION INTRAVENOUS; SUBCUTANEOUS at 06:36

## 2017-01-29 RX ADMIN — HEPARIN SODIUM 5000 UNITS: 5000 INJECTION, SOLUTION INTRAVENOUS; SUBCUTANEOUS at 21:58

## 2017-01-29 RX ADMIN — ATORVASTATIN CALCIUM 20 MG: 20 TABLET, FILM COATED ORAL at 17:58

## 2017-01-29 RX ADMIN — OXYCODONE HYDROCHLORIDE 5 MG: 5 TABLET ORAL at 04:31

## 2017-01-29 RX ADMIN — HEPARIN SODIUM 5000 UNITS: 5000 INJECTION, SOLUTION INTRAVENOUS; SUBCUTANEOUS at 14:09

## 2017-01-29 RX ADMIN — MAGNESIUM SULFATE HEPTAHYDRATE 2 G: 40 INJECTION, SOLUTION INTRAVENOUS at 15:49

## 2017-01-29 RX ADMIN — ACETAMINOPHEN 650 MG: 325 TABLET, FILM COATED ORAL at 04:31

## 2017-01-29 NOTE — CASE MANAGEMENT
Initial Surgical Review    Patient:  Leigh Macdonald  :  1940  MRN:  [id number]  CSN:  [id number]    67y o  year old female     SURGERY DATE: 2017    PROCEDURE(S) (LRB):  CAROTID ENDARTERECTOMY WITH RETROGRADE AND INNOMINATE ARTERY STENTING (Right)    POST-OP VITAL SIGNS:  Visit Vitals    /62  Comment: 89    Pulse 67    Temp 97 8 °F (36 6 °C) (Oral)    Resp 18    Ht 5' 3" (1 6 m)    Wt 91 3 kg (201 lb 4 5 oz)    SpO2 100%    BMI 35 66 kg/m2     ANESTHESIA TYPE: General    ADMISSION ORDERS:   17 1621  Inpatient Admission Once       Transfer Service: Vascular Surgery         Question Answer Comment     Admitting Physician Jose Rafael Byers      Level of Care Level 1 Stepdown      Estimated length of stay More than 2 Midnights      Certification I certify that inpatient services are medically necessary for this patient for a duration of greater than two midnights  See H&P and MD Progress Notes for additional information about the patient's course of treatment  Scheduled Meds:  allopurinol 300 mg Oral Daily   aspirin 81 mg Oral Daily   atorvastatin 20 mg Oral QPM   clopidogrel 75 mg Oral Daily   docusate sodium 100 mg Oral BID   heparin (porcine) 5,000 Units Subcutaneous Q8H [facility]   magnesium sulfate 2 g Intravenous Once   metoprolol succinate 200 mg Oral QAM   [MAR Hold] senna 1 tablet Oral HS     Continuous Infusions:  niCARdipine 1-15 mg/hr   IV levophed gtt then DCed    PAIN CONTROL:  Post-OP after 6 hours  PRN Meds:     PO acetaminophen prn x1    labetalol **AND** hydrALAZINE **AND** niCARdipine    IV morphine injection prn x1    nitroglycerin    ondansetron    PO oxyCODONE prn x1  IV Dilaudid prn x2 then DCed    A  Line left radial  telm  DIET:  Advance diet as tolerated, Cardiac Step1  MOBILITY:  Up with Assistance  DVT PROPHYALIXIS: Matt sequential compression devices

## 2017-01-29 NOTE — PROGRESS NOTES
Cardiology Progress Note - Darleen Myers 67 y o  female MRN: [id number]    Unit/Bed#: [facility] 414-01 Encounter: [id number]      Assessment:  Principal Problem:    Carotid stenosis, right  Active Problems:    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Demand ischemia of myocardium    Hyperlipidemia    Hypertension    PAD (peripheral artery disease)    CKD (chronic kidney disease) stage 3, GFR 30-59 ml/min    Coronary artery disease    Non-ST elevation myocardial infarction (NSTEMI), type 2      Plan:  Patient is comfortable this morning  She has no chest pain or significant dyspnea  She is in sinus rhythm on telemetry  Her troponin is trending downward  I will check a 2-D echocardiogram in reference to LV wall thickness and systolic function and the status of her bioprosthetic aortic valve  She continues on dual antiplatelet therapy  She does have prior history of coronary artery bypass graft surgery and follow-up PCI  She has had 2 aortic valve replacements  Her labs are stable this morning  Her vital signs are stable  From a cardiac point of view she can be discharged to a telemetry bed  Subjective:   Patient seen and examined  No significant events overnight   negative  Except for mild left hand swelling with recent removal of an A-line    Objective:     Vitals: Blood pressure (!) 145/44, pulse 68, temperature 98 5 °F (36 9 °C), temperature source Oral, resp  rate 18, height 5' 3" (1 6 m), weight 91 3 kg (201 lb 4 5 oz), SpO2 97 %  , Body mass index is 35 66 kg/(m^2)  , Orthostatic Blood Pressures         Most Recent Value    Blood Pressure  (!)  145/44 filed at 01/28/2017 0824      ,      Intake/Output Summary (Last 24 hours) at 01/29/17 0927  Last data filed at 01/29/17 0900   Gross per 24 hour   Intake              160 ml   Output              800 ml   Net             -640 ml       No significant arrhythmias seen on telemetry review         Physical Exam:    GEN: Darleen Myers appears well, alert and oriented x 3, pleasant and cooperative   NECK: supple, no carotid bruits, no JVD or HJR  HEART: normal rate, regular rhythm, normal S1 and S2, with an early peaking systolic murmur heard at the right upper sternal border  LUNGS: clear to auscultation bilaterally; no wheezes, rales, or rhonchi   ABDOMEN: normal bowel sounds, soft, no tenderness, no distention  EXTREMITIES: edema  SKIN: warm and well perfused, no suspicious lesions on exposed skin    Labs & Results:    Admission on 01/27/2017   Component Date Value    ABO Grouping 01/27/2017 A     Rh Factor 01/27/2017 Positive     Antibody Screen 01/27/2017 Negative     POC Glucose 01/27/2017 132     POC Glucose 01/27/2017 135     Activated Clotting Time,* 01/27/2017 218*    Specimen Type 01/27/2017 ARTERIAL     Activated Clotting Time,* 01/27/2017 229*    Specimen Type 01/27/2017 VENOUS     Activated Clotting Time,* 01/27/2017 257*    Specimen Type 01/27/2017 VENOUS     Activated Clotting Time,* 01/27/2017 229*    Specimen Type 01/27/2017 VENOUS     Activated Clotting Time,* 01/27/2017 246*    Specimen Type 01/27/2017 VENOUS     Activated Clotting Time,* 01/27/2017 269*    Specimen Type 01/27/2017 VENOUS     Troponin I 01/27/2017 <0 02     Ventricular Rate 01/28/2017 76     Atrial Rate 01/28/2017 76     CO Interval 01/28/2017 158     QRSD Interval 01/28/2017 104     QT Interval 01/28/2017 392     QTC Interval 01/28/2017 441     P Axis 01/28/2017 47     QRS Axis 01/28/2017 51     T Wave Axis 01/28/2017 21     Troponin I 01/27/2017 0 41*    Troponin I 01/28/2017 1 58*    Sodium 01/28/2017 141     Potassium 01/28/2017 4 3     Chloride 01/28/2017 110*    CO2 01/28/2017 24     Anion Gap 01/28/2017 7     BUN 01/28/2017 28*    Creatinine 01/28/2017 1 26     Glucose 01/28/2017 112     Calcium 01/28/2017 7 8*    eGFR 01/28/2017 41 2     WBC 01/28/2017 9 39     RBC 01/28/2017 3 12*    Hemoglobin 01/28/2017 9 4*    Hematocrit 01/28/2017 28 7*    MCV 01/28/2017 92     MCH 01/28/2017 30 1     MCHC 01/28/2017 32 8     RDW 01/28/2017 14 6     Platelets 11/65/9346 140*    MPV 01/28/2017 10 4     Troponin I 01/28/2017 2 39*    Troponin I 01/28/2017 2 72*    Troponin I 01/28/2017 3 68*    Troponin I 01/28/2017 2  15*    Troponin I 01/29/2017 1 42*    Sodium 01/29/2017 137     Potassium 01/29/2017 4 1     Chloride 01/29/2017 105     CO2 01/29/2017 22     Anion Gap 01/29/2017 10     BUN 01/29/2017 26*    Creatinine 01/29/2017 1 32*    Glucose 01/29/2017 118     Calcium 01/29/2017 8 0*    eGFR 01/29/2017 39 0     WBC 01/29/2017 11 70*    RBC 01/29/2017 3 12*    Hemoglobin 01/29/2017 9 5*    Hematocrit 01/29/2017 28 9*    MCV 01/29/2017 93     MCH 01/29/2017 30 4     MCHC 01/29/2017 32 9     RDW 01/29/2017 14 5     MPV 01/29/2017 10 7     Platelets 37/77/9149 142*    nRBC 01/29/2017 0     Neutrophils Relative 01/29/2017 74     Lymphocytes Relative 01/29/2017 16     Monocytes Relative 01/29/2017 8     Eosinophils Relative 01/29/2017 2     Basophils Relative 01/29/2017 0     Neutrophils Absolute 01/29/2017 8 63*    Lymphocytes Absolute 01/29/2017 1 85     Monocytes Absolute 01/29/2017 0 93     Eosinophils Absolute 01/29/2017 0 24     Basophils Absolute 01/29/2017 0 02     Magnesium 01/29/2017 1 2*       Xr Chest Portable    Result Date: 1/27/2017  Narrative: CHEST INDICATION:  Chest pain and pressure  COMPARISON:  January 23, 2017 VIEWS:   AP frontal;  1 image FINDINGS:  Lungs are suboptimally aerated  There are interstitial and alveolar infiltrates in both lungs  Left lung base is obscured  Cardiac silhouette is enlarged  Mild vascular congestion and peribronchial thickening  Prior median sternotomy  Coronary stent  Bony structures otherwise grossly intact  Numerous EKG leads in place  Impression: Diminished lung volumes  Cardiomegaly and pulmonary edema  Limited evaluation of left lung base    Underlying consolidation/effusion is not excluded  Follow-up PA and lateral exam recommended when possible  Workstation performed: [id number]     Xr Chest Pa & Lateral    Result Date: 1/24/2017  Narrative: CHEST INDICATION:  Preop for carotid surgery  COMPARISON:  November 13, 2013 VIEWS:  Frontal and lateral projections; 2 images FINDINGS:  Sternotomy wires noted  Coronary stent identified  Calcified granuloma left lower lobe  Cardiomediastinal silhouette appears unremarkable  The lungs are clear  No pneumothorax or pleural effusion  Visualized osseous structures appear within normal limits for the patient's age  Impression: No active pulmonary disease  Workstation performed: [id number]     Ir Upper Extremity / Intervention    Result Date: 1/27/2017  Narrative:     IR UPPER EXTREMITY / INTERVENTION Indication: Right innominate stenosis and the right internal carotid artery stenosis  Procedure: The procedure was performed in the hybrid operating room under general anesthesia  The right common carotid artery and carotid bifurcation were exposed by Dr Pura Chand which will be dictated under separate operative report  The distal right common carotid artery was accessed with a 21-gauge needle directed towards the aortic arch and a 5-Dutch micropuncture dilator was placed  A Adamson wire was advanced into the innominate artery over which a 6-Dutch sheath was placed  Imaging of the chest was performed with attention to the innominate stenosis  A Bentson wire was advanced into the ascending aorta through a 5-Dutch Kumpe catheter  The Kumpe was removed  There was no fluoroscopy on the sheath during the previous injection and the sheath was withdrawn into the common carotid artery and selective imaging was performed to ensure no consultation with sheath placement  The sheath was readvanced over its dilator and the Bentson was exchanged for a Adamson wire    The innominate stenosis was dilated using a 5 x 20 mm balloon and repeat arteriography was performed  The sheath was advanced across the stenosis into the ascending aorta  A 8 x 19 mm Swapna stent was advanced and placed in approximately position  The sheath was withdrawn and following repeat imaging to confirm positioning, the stent was deployed in standard fashion  Completion arteriography was performed through the sheath  The remainder the procedure will be described in the operative report  Cosurgeons: Dr Gauri Clakr and Dr Luu   Due to the complexity of the procedure, the need to simultaneously manipulate multiple intra arterial devices, and the elevated risk of atheroemboli, proper performance of the procedure required both a vascular surgeon and an interventional radiologist to be present  Fluoroscopy time: 7 6 MINUTES Images: 110 Contrast: 25 mL Visipaque 320  Findings: There is a high-grade stenosis at the origin of the innominate artery  Following 8mm stent placement, there is no residual stenosis  The right proximal and mid common carotid artery is normal   The proximal right subclavian artery is normal   There is retrograde flow in the right vertebral artery initially which was seen to be antegrade following stent placement  Impression: Impression: 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 successfully treated with 8mm stent placement using open common carotid artery approach Workstation performed: [id number]     Vas Carotid Limited Study    Result Date: 1/27/2017  Narrative:  [facility] REPORT CLINICAL: Indications:  Right Carotid disease w/o CVA [I65 29]  This is an intra operative exam  Operative History 1/27/2017 Right Standard (ICA, ECA and CCA) endarterectomy 1/1/2012 Aortic valve replacement 1/1/2012 Coronary artery angioplasty with stent 11/21/1996 CABGx2 Risk Factors The patient has history of HTN, hyperlipidemia, CAD and previous smoking (quit >10yrs ago)  Clinical Left Pressure:  130/72 mm Hg    FINDINGS:  Right        PSV EDV (cm/s)  Prox  ICA    101          16  Dist CCA     158          26  Ext Carotid  140          15     CONCLUSION:  Impression  Intra-op evaluation shows no evidence of mural thrombus, intimal flap or significant residual disease in the endarterectomized carotid arteries  SIGNATURE: Electronically Signed by: Nallely Houston on 2017-01-27 04:53:18 PM      EKG personally reviewed by Vince Dunn MD      Counseling / Coordination of Care  Total floor / unit time spent today 30 minutes  Greater than 50% of total time was spent with the patient and / or family counseling and / or coordination of care

## 2017-01-29 NOTE — PROGRESS NOTES
Progress Note - Critical Care   Obi Perales 67 y o  female MRN: [id number]  Unit/Bed#: [facility] 414-01 Encounter: [id number]    ------------------------------------------------------------------------------------------------  Assessment and Plan  Principal Problem:    Carotid stenosis, right  Active Problems:    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Demand ischemia of myocardium    Hyperlipidemia    Hypertension    PAD (peripheral artery disease)    CKD (chronic kidney disease) stage 3, GFR 30-59 ml/min    Coronary artery disease    Non-ST elevation myocardial infarction (NSTEMI), type 2  Resolved Problems:    * No resolved hospital problems  *      Neuro:   · Stable neuro exam  Anisocoria resolved  · Acute pain- primarily left thumb pain  Continue to PRN oxycodone       CV:   · Right carotid stenosis s/p CEA POD #2-  Continue routine neuro checks  Remove arterial line today  Continue ASA, plavix, statin  · Type 2 NSTEMI secondary to hypotension- troponins peaked and are trending down  Cardiology following, no intervention at this time  Continue home metoprolol  Will need outpatient follow up        Pulm:  ·  No acute issues  Encourage pulm toilet, IS q1h,      GI:   · No acute abdominal issues  Bowel regimen         :   · No acute issues  No COURTNEY koch's WNL      F/E/N:   · No IVF  · Hypomagnesemia- replete   · Tolerating cardiac diet     Heme:   · No acute issues  · PPx- SQH TID    Endo:   · No acute issues, goal <180      ID:   · Afebrile, no leukocytosis, follow CBC    MSK/Skin:   · OOBTC, ambulate, monitor skin for breakdown      Dispo: step down     Code Status: No Order  ----------------------------------------------------------------------------------------  Chief Complaint: Left thumb pain     HPI/24hr events: No acute overnight events   ---------------------------------------------------------------------------------------  Physical Exam   Constitutional: She is oriented to person, place, and time   She appears well-developed and well-nourished  She is sleeping  She is easily aroused  No distress  HENT:   Head: Normocephalic and atraumatic  Mouth/Throat: Mucous membranes are normal    Eyes: EOM are normal  Pupils are equal, round, and reactive to light  Neck: Neck supple  Right neck incision ROBERT, C/D/I no hematoma    Cardiovascular: Normal rate, regular rhythm and normal heart sounds  Pulses:       Radial pulses are 2+ on the right side Left radial pulse not accessible  Dorsalis pedis pulses are 2+ on the right side, and 2+ on the left side  Pulmonary/Chest: She is in respiratory distress  She has decreased breath sounds in the right lower field and the left lower field  She has no wheezes  She has no rhonchi  She has no rales  Abdominal: Soft  Bowel sounds are normal  There is no tenderness  Musculoskeletal:        Left hand: She exhibits decreased range of motion and tenderness  Left thumb tenderness to palpation, limited ROM, no erythema, edema    Neurological: She is oriented to person, place, and time and easily aroused  She has normal strength  GCS eye subscore is 4  GCS verbal subscore is 5  GCS motor subscore is 6  Skin: Skin is warm, dry and intact  Nursing note and vitals reviewed  ------------------------------------------------------------------------------------------  Vitals   Vitals:    17 0200 17 0400 17 0500 17 0600   BP:       Pulse: 64 78 76 74   Resp:  19 19 19   Temp:  99 3 °F (37 4 °C)     TempSrc:  Oral     SpO2: 100% 100% 98% 97%   Weight:    91 3 kg (201 lb 4 5 oz)   Height:         Temp (24hrs), Av 7 °F (37 1 °C), Min:98 1 °F (36 7 °C), Max:99 3 °F (37 4 °C)  Current: Temperature: 99 3 °F (37 4 °C)  HR: 64 - 90  BP: 106/36 - 158/46  RR: 17 - 34  SpO2: 94 - 100%    Height and Weights   Height: 5' 3" (160 cm)  IBW: 52 4 kg  Body mass index is 35 66 kg/(m^2)    Weight (last 2 days)     Date/Time   Weight    17 0600  91 3 (201 28) 01/27/17 1930  96 5 (212 74)    01/27/17 0954  92 5 (204)              Intake and Output  I/O       01/27 0701 - 01/28 0700 01/28 0701 - 01/29 0700 01/29 0701 - 01/30 0700    P  O  720 100     I V  (mL/kg) 4920 (51)      IV Piggyback 50 50     Total Intake(mL/kg) 5690 (59) 150 (1 6)     Urine (mL/kg/hr) 610 1173 (0 5)     Blood 100      Total Output 710 1173      Net +4980 -1023             Unmeasured Urine Occurrence 1 x 1 x            Nutrition       Diet Orders            Start     Ordered    01/27/17 1959  Diet Cardiac; Cardiac Step 1  Diet effective now     Comments:  Once fully recovered  Nursing to do bedside swallow assessment   Question Answer Comment   Diet Type Cardiac    Cardiac Cardiac Step 1    RD to adjust diet per protocol?  Yes        01/27/17 1958          Laboratory and Diagnostics:    Results from last 7 days  Lab Units 01/29/17  0433 01/28/17  0557 01/23/17  0940   WBC Thousand/uL 11 70* 9 39 9 02   HEMOGLOBIN g/dL 9 5* 9 4* 11 8   HEMATOCRIT % 28 9* 28 7* 35 4   PLATELETS Thousands/uL 142* 140* 190   NEUTROS PCT % 74  --  59   MONOS PCT % 8  --  5       Results from last 7 days  Lab Units 01/29/17  0433 01/28/17  0557 01/23/17  0940   SODIUM mmol/L 137 141 141   POTASSIUM mmol/L 4 1 4 3 4 3   CHLORIDE mmol/L 105 110* 108   CO2 mmol/L 22 24 28   BUN mg/dL 26* 28* 33*   CREATININE mg/dL 1 32* 1 26 1 36*   CALCIUM mg/dL 8 0* 7 8* 9 0   TOTAL PROTEIN g/dL  --   --  7 3   BILIRUBIN TOTAL mg/dL  --   --  0 48   ALK PHOS U/L  --   --  91   ALT U/L  --   --  18   AST U/L  --   --  9   GLUCOSE RANDOM mg/dL 118 112 112       Results from last 7 days  Lab Units 01/29/17  0433   MAGNESIUM mg/dL 1 2*       0  Lab Value Date/Time   TROPONINI 1 42 (H) 01/29/2017 0433   TROPONINI 2 15 (H) 01/28/2017 2214   TROPONINI 3 68 (H) 01/28/2017 1258   TROPONINI 2 72 (H) 01/28/2017 0925   TROPONINI 2 39 (H) 01/28/2017 0638   TROPONINI 1 58 (H) 01/28/2017 0130   TROPONINI 0 41 (H) 01/27/2017 2116   TROPONINI <0 02 01/27/2017 1812     EKG: NSR  Imaging: I have personally reviewed pertinent films in PACS        Allergies   Allergies   Allergen Reactions    Other Rash     Pt states she is allergic to tape adhesive       Active Medications  Scheduled Meds:  allopurinol 300 mg Oral Daily   aspirin 81 mg Oral Daily   atorvastatin 20 mg Oral QPM   clopidogrel 75 mg Oral Daily   docusate sodium 100 mg Oral BID   heparin (porcine) 5,000 Units Subcutaneous Q8H [facility]   magnesium sulfate 2 g Intravenous Once   magnesium sulfate 4 g Intravenous Once   metoprolol succinate 200 mg Oral QAM     Continuous Infusions:    niCARdipine 1-15 mg/hr     PRN Meds:     acetaminophen 650 mg Q6H PRN   labetalol 5 mg Q15 Min PRN   And     hydrALAZINE 15 mg Q15 Min PRN   And     niCARdipine 1-15 mg/hr Continuous PRN   morphine injection 2 mg Q4H PRN   nitroglycerin 0 4 mg Q5 Min PRN   ondansetron 4 mg Q6H PRN   oxyCODONE 5 mg Q4H PRN       VTE Pharmacologic Prophylaxis: Heparin  VTE Mechanical Prophylaxis: sequential compression device    Invasive  Devices  Invasive Devices     Peripheral Intravenous Line            Peripheral IV 01/20/17 Left Forearm 8 days          Arterial Line            Arterial Line 01/27/17 Left Radial 1 day              Counseling / Coordination of Care  Total time spent today 25 minutes  Greater than 50% of total time was spent with the patient and / or family counseling and / or coordination of care  A description of the counseling / coordination of care: EVITA Veras      Portions of the record may have been created with voice recognition software  Occasional wrong word or "sound a like" substitutions may have occurred due to the inherent limitations of voice recognition software    Read the chart carefully and recognize, using context, where substitutions have occurred

## 2017-01-29 NOTE — PROGRESS NOTES
Progress Note - Vascular Surgery   Shola Brown 67 y o  female MRN: [id number]  Unit/Bed#: [facility] 414-01 Encounter: [id number]      Subjective:  No events overnight  Pt feeling well without chest pain  No numbness/tingling, or change in vision    -Trops 2 15 <-- 3 68 <-- 2 72 <-- 2 39    Vitals:  Visit Vitals    BP (!) 145/44    Pulse 74    Temp 99 3 °F (37 4 °C) (Oral)    Resp 19    Ht 5' 3" (1 6 m)    Wt 91 3 kg (201 lb 4 5 oz)    SpO2 97%    BMI 35 66 kg/m2       I/Os:  I/O last 3 completed shifts: In: 2125 [P O :720;  I V :4920; IV Piggyback:100]  Out: 8872 [Urine:1433; Blood:100]  I/O this shift:  In: 100 [P O :100]  Out: 350 [Urine:350]    Lab Results and Cultures:   CBC with diff:   Lab Results   Component Value Date    WBC 11 70 (H) 01/29/2017    HGB 9 5 (L) 01/29/2017    HCT 28 9 (L) 01/29/2017    MCV 93 01/29/2017     (L) 01/29/2017    MCH 30 4 01/29/2017    MCHC 32 9 01/29/2017    RDW 14 5 01/29/2017    MPV 10 7 01/29/2017    NRBC 0 01/29/2017   ,   BMP/CMP:  Lab Results   Component Value Date     01/29/2017    K 4 1 01/29/2017     01/29/2017    CO2 22 01/29/2017    ANIONGAP 10 01/29/2017    BUN 26 (H) 01/29/2017    CREATININE 1 32 (H) 01/29/2017    GLUCOSE 118 01/29/2017    CALCIUM 8 0 (L) 01/29/2017    AST 9 01/23/2017    ALT 18 01/23/2017    ALKPHOS 91 01/23/2017    PROT 7 3 01/23/2017    ALBUMIN 3 5 01/23/2017    BILITOT 0 48 01/23/2017    EGFR 39 0 01/29/2017   ,   Lipid Panel: No results found for: CHOL,   Coags:   Lab Results   Component Value Date    INR 1 01 01/23/2017   ,     Blood Culture: No results found for: BLOODCX,   Urinalysis: No results found for: COLORU, CLARITYU, SPECGRAV, PHUR, LEUKOCYTESUR, NITRITE, PROTEINUA, GLUCOSEU, KETONESU, BILIRUBINUR, BLOODU,   Urine Culture: No results found for: URINECX,   Wound Culure: No results found for: WOUNDCULT    Medications:  Current Facility-Administered Medications   Medication Dose Route Frequency    acetaminophen (TYLENOL) tablet 650 mg  650 mg Oral Q6H PRN    allopurinol (ZYLOPRIM) tablet 300 mg  300 mg Oral Daily    aspirin chewable tablet 81 mg  81 mg Oral Daily    atorvastatin (LIPITOR) tablet 20 mg  20 mg Oral QPM    clopidogrel (PLAVIX) tablet 75 mg  75 mg Oral Daily    docusate sodium (COLACE) capsule 100 mg  100 mg Oral BID    heparin (porcine) subcutaneous injection 5,000 Units  5,000 Units Subcutaneous Q8H [address]    labetalol (NORMODYNE) injection 5 mg  5 mg Intravenous Q15 Min PRN    And    hydrALAZINE (APRESOLINE) injection 15 mg  15 mg Intravenous Q15 Min PRN    And    niCARdipine (CARDENE) 25 mg (STANDARD CONCENTRATION) in sodium chloride 0 9% 250 mL  1-15 mg/hr Intravenous Continuous PRN    magnesium sulfate 2 g/50 mL IVPB (premix) 2 g  2 g Intravenous Once    magnesium sulfate 4 g/100 mL IVPB (premix) 4 g  4 g Intravenous Once    metoprolol succinate (TOPROL-XL) 24 hr tablet 200 mg  200 mg Oral QAM    morphine 2 mg/mL injection 2 mg  2 mg Intravenous Q4H PRN    nitroglycerin (NITROSTAT) SL tablet 0 4 mg  0 4 mg Sublingual Q5 Min PRN    ondansetron (ZOFRAN) injection 4 mg  4 mg Intravenous Q6H PRN    oxyCODONE (ROXICODONE) IR tablet 5 mg  5 mg Oral Q4H PRN       Imaging:      Physical Exam:    Physical Exam: General: AAOx3  Respiratory: BS b/l  Neck: trachea midline, incision c/d/i,   Neurological: following all commands, tongue midline  nonfocal pupils equal b/l and reactive now   Abdomen: Soft, NT, no rebound/guarding    Heart: RRR, S1s2  Ext: Warm no cyanosis         Assessment:  66 yo F with right carotid stenosis and right innominate artery stenosis s/p right carotid endarterectomy with right retrograde innominate artery stenting     Plan:  Cardiac diet   TANIKA encinas   F/U cardiolgoy for discharge planning  DVT ppx SQH  Cont ASA and plavix  PT/OT     Whit Augustin MD  1/29/2017

## 2017-01-30 ENCOUNTER — APPOINTMENT (INPATIENT)
Dept: NON INVASIVE DIAGNOSTICS | Facility: HOSPITAL | Age: 77
DRG: 038 | End: 2017-01-30
Attending: INTERNAL MEDICINE
Payer: COMMERCIAL

## 2017-01-30 PROBLEM — I48.91 NEW ONSET A-FIB (HCC): Status: ACTIVE | Noted: 2017-01-30

## 2017-01-30 LAB
ANION GAP SERPL CALCULATED.3IONS-SCNC: 11 MMOL/L (ref 4–13)
ATRIAL RATE: 117 BPM
BASOPHILS # BLD AUTO: 0.02 THOUSANDS/ΜL (ref 0–0.1)
BASOPHILS NFR BLD AUTO: 0 % (ref 0–1)
BUN SERPL-MCNC: 27 MG/DL (ref 5–25)
CALCIUM SERPL-MCNC: 8.3 MG/DL (ref 8.3–10.1)
CHLORIDE SERPL-SCNC: 104 MMOL/L (ref 100–108)
CO2 SERPL-SCNC: 22 MMOL/L (ref 21–32)
CREAT SERPL-MCNC: 1.11 MG/DL (ref 0.6–1.3)
EOSINOPHIL # BLD AUTO: 0.15 THOUSAND/ΜL (ref 0–0.61)
EOSINOPHIL NFR BLD AUTO: 2 % (ref 0–6)
ERYTHROCYTE [DISTWIDTH] IN BLOOD BY AUTOMATED COUNT: 14.3 % (ref 11.6–15.1)
GFR SERPL CREATININE-BSD FRML MDRD: 47.7 ML/MIN/1.73SQ M
GLUCOSE SERPL-MCNC: 131 MG/DL (ref 65–140)
HCT VFR BLD AUTO: 26.7 % (ref 34.8–46.1)
HGB BLD-MCNC: 8.9 G/DL (ref 11.5–15.4)
LYMPHOCYTES # BLD AUTO: 1.42 THOUSANDS/ΜL (ref 0.6–4.47)
LYMPHOCYTES NFR BLD AUTO: 14 % (ref 14–44)
MAGNESIUM SERPL-MCNC: 2.4 MG/DL (ref 1.6–2.6)
MCH RBC QN AUTO: 30 PG (ref 26.8–34.3)
MCHC RBC AUTO-ENTMCNC: 33.3 G/DL (ref 31.4–37.4)
MCV RBC AUTO: 90 FL (ref 82–98)
MONOCYTES # BLD AUTO: 0.69 THOUSAND/ΜL (ref 0.17–1.22)
MONOCYTES NFR BLD AUTO: 7 % (ref 4–12)
NEUTROPHILS # BLD AUTO: 8.02 THOUSANDS/ΜL (ref 1.85–7.62)
NEUTS SEG NFR BLD AUTO: 77 % (ref 43–75)
NRBC BLD AUTO-RTO: 0 /100 WBCS
PLATELET # BLD AUTO: 155 THOUSANDS/UL (ref 149–390)
PMV BLD AUTO: 10.5 FL (ref 8.9–12.7)
POTASSIUM SERPL-SCNC: 3.9 MMOL/L (ref 3.5–5.3)
QRS AXIS: 53 DEGREES
QRSD INTERVAL: 116 MS
QT INTERVAL: 340 MS
QTC INTERVAL: 478 MS
RBC # BLD AUTO: 2.97 MILLION/UL (ref 3.81–5.12)
SODIUM SERPL-SCNC: 137 MMOL/L (ref 136–145)
T WAVE AXIS: 194 DEGREES
VENTRICULAR RATE: 119 BPM
WBC # BLD AUTO: 10.32 THOUSAND/UL (ref 4.31–10.16)

## 2017-01-30 PROCEDURE — A9270 NON-COVERED ITEM OR SERVICE: HCPCS | Performed by: PHYSICIAN ASSISTANT

## 2017-01-30 PROCEDURE — A9270 NON-COVERED ITEM OR SERVICE: HCPCS | Performed by: SURGERY

## 2017-01-30 PROCEDURE — 83735 ASSAY OF MAGNESIUM: CPT | Performed by: NURSE PRACTITIONER

## 2017-01-30 PROCEDURE — 85025 COMPLETE CBC W/AUTO DIFF WBC: CPT | Performed by: NURSE PRACTITIONER

## 2017-01-30 PROCEDURE — A9270 NON-COVERED ITEM OR SERVICE: HCPCS | Performed by: NURSE PRACTITIONER

## 2017-01-30 PROCEDURE — 80048 BASIC METABOLIC PNL TOTAL CA: CPT | Performed by: NURSE PRACTITIONER

## 2017-01-30 PROCEDURE — 93306 TTE W/DOPPLER COMPLETE: CPT

## 2017-01-30 RX ADMIN — METOPROLOL TARTRATE 5 MG: 5 INJECTION INTRAVENOUS at 04:54

## 2017-01-30 RX ADMIN — ASPIRIN 81 MG 81 MG: 81 TABLET ORAL at 09:08

## 2017-01-30 RX ADMIN — HEPARIN SODIUM 5000 UNITS: 5000 INJECTION, SOLUTION INTRAVENOUS; SUBCUTANEOUS at 13:09

## 2017-01-30 RX ADMIN — ACETAMINOPHEN 650 MG: 325 TABLET, FILM COATED ORAL at 22:06

## 2017-01-30 RX ADMIN — DOCUSATE SODIUM 100 MG: 100 CAPSULE, LIQUID FILLED ORAL at 09:08

## 2017-01-30 RX ADMIN — CLOPIDOGREL BISULFATE 75 MG: 75 TABLET ORAL at 09:08

## 2017-01-30 RX ADMIN — ACETAMINOPHEN 650 MG: 325 TABLET, FILM COATED ORAL at 05:02

## 2017-01-30 RX ADMIN — HEPARIN SODIUM 5000 UNITS: 5000 INJECTION, SOLUTION INTRAVENOUS; SUBCUTANEOUS at 05:05

## 2017-01-30 RX ADMIN — DOCUSATE SODIUM 100 MG: 100 CAPSULE, LIQUID FILLED ORAL at 18:35

## 2017-01-30 RX ADMIN — HEPARIN SODIUM 5000 UNITS: 5000 INJECTION, SOLUTION INTRAVENOUS; SUBCUTANEOUS at 22:06

## 2017-01-30 RX ADMIN — ALLOPURINOL 300 MG: 300 TABLET ORAL at 09:08

## 2017-01-30 RX ADMIN — SENNOSIDES 8.6 MG: 8.6 TABLET, FILM COATED ORAL at 22:06

## 2017-01-30 RX ADMIN — ATORVASTATIN CALCIUM 20 MG: 20 TABLET, FILM COATED ORAL at 18:35

## 2017-01-30 RX ADMIN — METOPROLOL SUCCINATE 200 MG: 100 TABLET, EXTENDED RELEASE ORAL at 06:32

## 2017-01-30 NOTE — MEDICAL STUDENT
Progress Note - Cardiology   Abdul Lesches 67 y o  female MRN: [id number]  Unit/Bed#: [facility] 420-01 Encounter: [id number]    Assessment:  Lacho Ogden is a 65yo female with PMH significant for CAD [s/p CABG in 1996 (OM1, OM3, RCA), stenting of circumflex CABG and mid-LAD in 2003 2/2 UA, NSTEMI in 2007, restenting of circumflex in 2011], AS s/p open AVR w/ bioprosthetic valve in 2012, innominate artery stenosis s/p IR guided stent on 1/27, and carotid stenosis s/p endarterectomy on 1/27/17 who presents with chest discomfort after her surgery on 1/27  Troponin trending peaked at 3 68 on 1/28 (1 42 on 1/29) and subsequently ASA 81mg QD and Plavix 75mg QD were restarted    Stat bedside echo on 1/27 showed normal EF with normal RV and LV function  EKG on 1/28 was stable and showed normal sinus rhythm, low voltage QRS comlpexes, and nonspecific ST abnormality (possible digitalis effect)  However, on the night of 1/29, she had a 5-10min run of palpitations that woke her from sleep  Telemetry strip showed new-onset Afib  Principal Problem:  - Carotid stenosis, right  Active Problems:  - New onset POAF  - Innominate artery stenosis, right  - Hyperlipidemia  - Hypertension  - PAD (peripheral artery disease)  - CKD (chronic kidney disease) stage 3, GFR 30-59 ml/min  - Coronary artery disease  - Non-ST elevation myocardial infarction (NSTEMI), type 2    Plan:  New onset POAF - Patient is asymptomatic, she had a 5-10 minute episode last night and on telemetry has periodically had brief periods of Afib  - Continue Toprol XL 200mg QAM for rate control with Lopressor 5mg IV for breakthrough  NSTEMI Type 2/CAD - Troponins have trended down, patient is comfortable this AM, she has had no CP or SOB  - Continue ASA and Plavix      Subjective/Objective     Subjective: Ms Donald Collins was sitting comfortably in a chair this AM  Denies CP and SOB, N/V  However, she felt palpitations last night that woke her from sleep   She has never felt palpitations like that before, that was the only time she was symptomatic  Objective: Sitting comfortably in chair    Vitals:   Visit Vitals    /53  Comment: Map 84    Pulse 88    Temp (!) 97 4 °F (36 3 °C) (Tympanic)    Resp 18    Ht 5' 3" (1 6 m)    Wt 91 3 kg (201 lb 4 5 oz)    SpO2 95%    BMI 35 66 kg/m2     Vitals:    01/27/17 1930 01/29/17 0600   Weight: 96 5 kg (212 lb 11 9 oz) 91 3 kg (201 lb 4 5 oz)     Orthostatic Blood Pressures         Most Recent Value    Blood Pressure  107/53 [Map 84] filed at 01/30/2017 0740    Patient Position  Sitting filed at 01/30/2017 0740            Intake/Output Summary (Last 24 hours) at 01/30/17 1045  Last data filed at 01/30/17 7789   Gross per 24 hour   Intake              840 ml   Output             1250 ml   Net             -410 ml       Invasive Devices     Peripheral Intravenous Line            Peripheral IV 01/20/17 Left Forearm 10 days                Review of Systems: Negative except palpitations last night    Physical Exam: General appearance: alert, appears stated age, cooperative, no distress and mildly obese  Lungs: clear to auscultation bilaterally  Heart: regular rate and rhythm, S1, S2 normal, no murmur, click, rub or gallop  Extremities: 1+ pitting edema b/l up to knees    Lab Results:   I have personally reviewed pertinent lab results      CBC with diff:   Results from last 7 days  Lab Units 01/30/17  0631   WBC Thousand/uL 10 32*   RBC Million/uL 2 97*   HEMOGLOBIN g/dL 8 9*   HEMATOCRIT % 26 7*   MCV fL 90   MCH pg 30 0   MCHC g/dL 33 3   RDW % 14 3   MPV fL 10 5   PLATELETS Thousands/uL 155     CMP:   Results from last 7 days  Lab Units 01/30/17  0631   SODIUM mmol/L 137   POTASSIUM mmol/L 3 9   CHLORIDE mmol/L 104   CO2 mmol/L 22   ANION GAP mmol/L 11   BUN mg/dL 27*   CREATININE mg/dL 1 11   GLUCOSE RANDOM mg/dL 131   CALCIUM mg/dL 8 3   EGFR ml/min/1 73sq m 47 7   Magnesium - 2 4    Troponin:   0  Lab Value Date/Time   TROPONINI 1 42 (H) 01/29/2017 0433   TROPONINI 2 15 (H) 01/28/2017 2214   TROPONINI 3 68 (H) 01/28/2017 1258   TROPONINI 2 72 (H) 01/28/2017 0925   TROPONINI 2 39 (H) 01/28/2017 0638   TROPONINI 1 58 (H) 01/28/2017 0130   TROPONINI 0 41 (H) 01/27/2017 2116   TROPONINI <0 02 01/27/2017 1812     Imaging: I have personally reviewed pertinent reports  IR upper extremity (1/27): IMPRESSION:  Impression:   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 successfully treated with 8mm stent placement using open common carotid artery approach    VAS carotid limited study (1/27): Impression  Intra-op evaluation shows no evidence of mural thrombus, intimal flap or  significant residual disease in the endarterectomized carotid arteries  CXR (1/27): IMPRESSION:  Diminished lung volumes  Cardiomegaly and pulmonary edema  Limited evaluation of left lung base  Underlying consolidation/effusion is not excluded  Follow-up PA and lateral exam recommended when possible  EKG (1/28):  Normal sinus rhythm  Low voltage QRS  ST abnormality, possible digitalis effect  Abnormal ECG  When compared with ECG of 23-JAN-2017 09:51,  No significant change was found    VTE Pharmacologic Prophylaxis: Heparin  VTE Mechanical Prophylaxis: sequential compression device    Rios Snell, MS3

## 2017-01-30 NOTE — PLAN OF CARE
Present in room with Dr Abbey Joel to discuss plan of care for today and pt  will need to stay in hospital for today due to atrial fibrillation overnight

## 2017-01-30 NOTE — SUBJECTIVE & OBJECTIVE
Progress Note - Vascular Surgery     * Carotid stenosis, right  Assessment & Plan    Assessment:   S/P right carotid endarterectomy with right retrograde innominate artery stenting 1/27/17    Plan:   Continue aspirin, Plavix and Lipitor  Neurovascular exam stable    Demand ischemia of myocardium  Assessment & Plan    Assessment:   Demand ischemia secondary to hypotension eric-op  Troponins improving    Plan:   Echocardiogram pending  As per cardiology    New onset a-fib  Assessment & Plan    Assessment:   New-onset atrial fibrillation with RVR overnight  Currently in NSR this a m  Plan:   Cardiology evaluation in progress  Continue beta blockers        Subjective:  Patient admits to feeling heart racing overnight  Denies chest pain or shortness of breath  No neurologic deficits  Vitals:  Visit Vitals    /53  Comment: Map 84    Pulse 88    Temp (!) 97 4 °F (36 3 °C) (Tympanic)    Resp 18    Ht 5' 3" (1 6 m)    Wt 91 3 kg (201 lb 4 5 oz)    SpO2 95%    BMI 35 66 kg/m2       I/Os:  I/O last 3 completed shifts:   In: 1000 [P O :1000]  Out: 9454 [Urine:1475]  I/O this shift:  In: -   Out: 200 [Urine:200]    Lab Results and Cultures:   Lab Results   Component Value Date    WBC 10 32 (H) 01/30/2017    HGB 8 9 (L) 01/30/2017    HCT 26 7 (L) 01/30/2017    MCV 90 01/30/2017     01/30/2017     Lab Results   Component Value Date    GLUCOSE 131 01/30/2017    CALCIUM 8 3 01/30/2017     01/30/2017    K 3 9 01/30/2017    CO2 22 01/30/2017     01/30/2017    BUN 27 (H) 01/30/2017    CREATININE 1 11 01/30/2017     Lab Results   Component Value Date    INR 1 01 01/23/2017    PROTIME 13 4 01/23/2017        Blood Culture: No results found for: BLOODCX,   Urinalysis: No results found for: Dorthey Docker, SPECGRAV, PHUR, LEUKOCYTESUR, NITRITE, PROTEINUA, GLUCOSEU, KETONESU, BILIRUBINUR, BLOODU,   Urine Culture: No results found for: URINECX,   Wound Culure: No results found for: WOUNDCULT    Medications:  Current Facility-Administered Medications   Medication Dose Route Frequency    acetaminophen (TYLENOL) tablet 650 mg  650 mg Oral Q6H PRN    allopurinol (ZYLOPRIM) tablet 300 mg  300 mg Oral Daily    aspirin chewable tablet 81 mg  81 mg Oral Daily    atorvastatin (LIPITOR) tablet 20 mg  20 mg Oral QPM    clopidogrel (PLAVIX) tablet 75 mg  75 mg Oral Daily    docusate sodium (COLACE) capsule 100 mg  100 mg Oral BID    heparin (porcine) subcutaneous injection 5,000 Units  5,000 Units Subcutaneous Q8H [address]    labetalol (NORMODYNE) injection 5 mg  5 mg Intravenous Q15 Min PRN    And    hydrALAZINE (APRESOLINE) injection 15 mg  15 mg Intravenous Q15 Min PRN    And    niCARdipine (CARDENE) 25 mg (STANDARD CONCENTRATION) in sodium chloride 0 9% 250 mL  1-15 mg/hr Intravenous Continuous PRN    metoprolol succinate (TOPROL-XL) 24 hr tablet 200 mg  200 mg Oral QAM    morphine 2 mg/mL injection 2 mg  2 mg Intravenous Q4H PRN    nitroglycerin (NITROSTAT) SL tablet 0 4 mg  0 4 mg Sublingual Q5 Min PRN    ondansetron (ZOFRAN) injection 4 mg  4 mg Intravenous Q6H PRN    oxyCODONE (ROXICODONE) IR tablet 5 mg  5 mg Oral Q4H PRN    senna (SENOKOT) tablet 8 6 mg  1 tablet Oral HS       Physical Exam:    General appearance: alert, appears stated age and cooperative  Skin: Skin color, texture, turgor normal  No rashes or lesions  Neurologic: Grossly normal  Neck: Incision CDI, no hematoma or bleeding  Lungs: clear to auscultation bilaterally  Heart: regular rate and rhythm and S1, S2 normal  Abdomen: soft, non-tender; bowel sounds normal; no masses,  no organomegaly  Extremities: extremities normal, atraumatic, no cyanosis or edema    Gable Leventhal, PA-C  1/30/2017

## 2017-01-30 NOTE — PROGRESS NOTES
Cardiology Progress Note - Dionna Fisher 67 y o  female MRN: [id number]    Unit/Bed#: [facility] 420-01 Encounter: [id number]      Assessment/Recommendations:  1) Right carotid stenosis: s/p endarterectomy  Continued on ASA, statin, plavix  2) Type 2 NSTEMI: with EKG changes and hypotension in that setting  Troponin trending down  Echo pending to evaluate wall motion  Treating conservatively with ASA, statin, plavix, and B-blocker  3) HTN: well controlled, continue current regimen  4) HLD: continued on statin  5) PAD  7) h/o CAD/CABG and PCI: continue medical management as above  8) s/p 2 prior AVR: will evaluate function of valve with echo  9) Afib with RVR: now back in SR, continue B-blocker  Very brief episode lasting about 2-3 hours  Subjective:   Patient seen and examined  No significant events overnight   ; pertinent negatives - chest pain, chest pressure/discomfort, irregular heart beat and palpitations  Objective:     Vitals: Blood pressure 107/53, pulse 88, temperature (!) 97 4 °F (36 3 °C), temperature source Tympanic, resp  rate 18, height 5' 3" (1 6 m), weight 91 3 kg (201 lb 4 5 oz), SpO2 95 %  , Body mass index is 35 66 kg/(m^2)  , Orthostatic Blood Pressures         Most Recent Value    Blood Pressure  107/53 [Map 84] filed at 01/30/2017 0740    Patient Position  Sitting filed at 01/30/2017 0740            Intake/Output Summary (Last 24 hours) at 01/30/17 1050  Last data filed at 01/30/17 4227   Gross per 24 hour   Intake              840 ml   Output             1250 ml   Net             -410 ml       TELE: Afib early this morning, now back in SR      Physical Exam:    GEN: Dionna Fisher appears well, alert and oriented x 3, pleasant and cooperative   HEENT: pupils equal, round, and reactive to light; extraocular muscles intact  NECK: supple, no carotid bruits   HEART: regular rhythm, normal S1 and S2, early-peaking systolic murmur, no clicks, gallops or rubs   LUNGS: clear to auscultation bilaterally; no wheezes, rales, or rhonchi   ABDOMEN: normal bowel sounds, soft, no tenderness, no distention  EXTREMITIES: peripheral pulses normal; no clubbing, cyanosis, (+) bilateral edema  NEURO: no focal findings   SKIN: normal without suspicious lesions on exposed skin    Medications:      Current Facility-Administered Medications:     acetaminophen (TYLENOL) tablet 650 mg, 650 mg, Oral, Q6H PRN, Mima Le, PA-C, 650 mg at 01/30/17 0502    allopurinol (ZYLOPRIM) tablet 300 mg, 300 mg, Oral, Daily, Randene Poplar, PA-C, 300 mg at 01/30/17 0908    aspirin chewable tablet 81 mg, 81 mg, Oral, Daily, Leia Gutierrez MD, 81 mg at 01/30/17 0908    atorvastatin (LIPITOR) tablet 20 mg, 20 mg, Oral, QPM, Mima Le, PA-C, 20 mg at 01/29/17 1758    clopidogrel (PLAVIX) tablet 75 mg, 75 mg, Oral, Daily, Mima Le PA-C, 75 mg at 01/30/17 0908    docusate sodium (COLACE) capsule 100 mg, 100 mg, Oral, BID, Randtimo Le, PA-C, 100 mg at 01/30/17 0908    heparin (porcine) subcutaneous injection 5,000 Units, 5,000 Units, Subcutaneous, Q8H [facility], Sandra Zambrano, PA-C, 5,000 Units at 01/30/17 0505    labetalol (NORMODYNE) injection 5 mg, 5 mg, Intravenous, Q15 Min PRN **AND** hydrALAZINE (APRESOLINE) injection 15 mg, 15 mg, Intravenous, Q15 Min PRN **AND** niCARdipine (CARDENE) 25 mg (STANDARD CONCENTRATION) in sodium chloride 0 9% 250 mL, 1-15 mg/hr, Intravenous, Continuous PRN, Mima Le PA-C    metoprolol succinate (TOPROL-XL) 24 hr tablet 200 mg, 200 mg, Oral, QAM, Mima Le, PA-C, 200 mg at 01/30/17 4934    morphine 2 mg/mL injection 2 mg, 2 mg, Intravenous, Q4H PRN, Mima Le PA-C, 2 mg at 01/28/17 2005    nitroglycerin (NITROSTAT) SL tablet 0 4 mg, 0 4 mg, Sublingual, Q5 Min PRN, Mima Le PA-C    ondansetron [facility]) injection 4 mg, 4 mg, Intravenous, Q6H PRN, Mima Le PA-C, 4 mg at 01/29/17 2158    oxyCODONE (ROXICODONE) IR tablet 5 mg, 5 mg, Oral, Q4H PRN, Maurizio Anderson PA-C, 5 mg at 01/29/17 0431    senna (SENOKOT) tablet 8 6 mg, 1 tablet, Oral, HS, Rayfield Moder, CRNP     Labs & Results:      Results from last 7 days  Lab Units 01/29/17  0433 01/28/17  2214 01/28/17  1258   TROPONIN I ng/mL 1 42* 2 15* 3 68*       Results from last 7 days  Lab Units 01/30/17  0631 01/29/17  0433 01/28/17  0557   WBC Thousand/uL 10 32* 11 70* 9 39   HEMOGLOBIN g/dL 8 9* 9 5* 9 4*   HEMATOCRIT % 26 7* 28 9* 28 7*   PLATELETS Thousands/uL 155 142* 140*           Results from last 7 days  Lab Units 01/30/17  0631 01/29/17  0433 01/28/17  0557   SODIUM mmol/L 137 137 141   POTASSIUM mmol/L 3 9 4 1 4 3   CHLORIDE mmol/L 104 105 110*   CO2 mmol/L 22 22 24   BUN mg/dL 27* 26* 28*   CREATININE mg/dL 1 11 1 32* 1 26   CALCIUM mg/dL 8 3 8 0* 7 8*   GLUCOSE RANDOM mg/dL 131 118 112           Results from last 7 days  Lab Units 01/30/17  0631 01/29/17  1847 01/29/17  0433   MAGNESIUM mg/dL 2 4 3 0* 1 2*       Echo: pending    EKG personally reviewed by Collette Nail, MD      Counseling / Coordination of Care  Total floor / unit time spent today 30 minutes  Greater than 50% of total time was spent with the patient and / or family counseling and / or coordination of care

## 2017-01-30 NOTE — PROGRESS NOTES
Progress Note - Camilo Jeffers 67 y o  female MRN: [id number]    Unit/Bed#: [facility] 420-01 Encounter: [id number]        * Carotid stenosis, right  Assessment & Plan    Assessment:   S/P right carotid endarterectomy with right retrograde innominate artery stenting 1/27/17    Plan:   Continue aspirin, Plavix and Lipitor  Neurovascular exam stable    Demand ischemia of myocardium  Assessment & Plan    Assessment:   Demand ischemia secondary to hypotension eric-op  Troponins improving    Plan:   Echocardiogram pending  As per cardiology    New onset a-fib  Assessment & Plan    Assessment:   New-onset atrial fibrillation with RVR overnight  Currently in NSR this a m  Plan:   Cardiology evaluation in progress  Continue beta blockers      Progress Note - Vascular Surgery     * Carotid stenosis, right  Assessment & Plan    Assessment:   S/P right carotid endarterectomy with right retrograde innominate artery stenting 1/27/17    Plan:   Continue aspirin, Plavix and Lipitor  Neurovascular exam stable    Demand ischemia of myocardium  Assessment & Plan    Assessment:   Demand ischemia secondary to hypotension eric-op  Troponins improving    Plan:   Echocardiogram pending  As per cardiology    New onset a-fib  Assessment & Plan    Assessment:   New-onset atrial fibrillation with RVR overnight  Currently in NSR this a m  Plan:   Cardiology evaluation in progress  Continue beta blockers        Subjective:  Patient admits to feeling heart racing overnight  Denies chest pain or shortness of breath  No neurologic deficits  Vitals:  Visit Vitals    /53  Comment: Map 84    Pulse 88    Temp (!) 97 4 °F (36 3 °C) (Tympanic)    Resp 18    Ht 5' 3" (1 6 m)    Wt 91 3 kg (201 lb 4 5 oz)    SpO2 95%    BMI 35 66 kg/m2       I/Os:  I/O last 3 completed shifts:   In: 1000 [P O :1000]  Out: 9430 [Urine:1475]  I/O this shift:  In: -   Out: 200 [Urine:200]    Lab Results and Cultures:   Lab Results   Component Value Date WBC 10 32 (H) 01/30/2017    HGB 8 9 (L) 01/30/2017    HCT 26 7 (L) 01/30/2017    MCV 90 01/30/2017     01/30/2017     Lab Results   Component Value Date    GLUCOSE 131 01/30/2017    CALCIUM 8 3 01/30/2017     01/30/2017    K 3 9 01/30/2017    CO2 22 01/30/2017     01/30/2017    BUN 27 (H) 01/30/2017    CREATININE 1 11 01/30/2017     Lab Results   Component Value Date    INR 1 01 01/23/2017    PROTIME 13 4 01/23/2017        Blood Culture: No results found for: BLOODCX,   Urinalysis: No results found for: COLORU, CLARITYU, SPECGRAV, PHUR, LEUKOCYTESUR, NITRITE, PROTEINUA, GLUCOSEU, KETONESU, BILIRUBINUR, BLOODU,   Urine Culture: No results found for: URINECX,   Wound Culure: No results found for: WOUNDCULT    Medications:  Current Facility-Administered Medications   Medication Dose Route Frequency    acetaminophen (TYLENOL) tablet 650 mg  650 mg Oral Q6H PRN    allopurinol (ZYLOPRIM) tablet 300 mg  300 mg Oral Daily    aspirin chewable tablet 81 mg  81 mg Oral Daily    atorvastatin (LIPITOR) tablet 20 mg  20 mg Oral QPM    clopidogrel (PLAVIX) tablet 75 mg  75 mg Oral Daily    docusate sodium (COLACE) capsule 100 mg  100 mg Oral BID    heparin (porcine) subcutaneous injection 5,000 Units  5,000 Units Subcutaneous Q8H [address]    labetalol (NORMODYNE) injection 5 mg  5 mg Intravenous Q15 Min PRN    And    hydrALAZINE (APRESOLINE) injection 15 mg  15 mg Intravenous Q15 Min PRN    And    niCARdipine (CARDENE) 25 mg (STANDARD CONCENTRATION) in sodium chloride 0 9% 250 mL  1-15 mg/hr Intravenous Continuous PRN    metoprolol succinate (TOPROL-XL) 24 hr tablet 200 mg  200 mg Oral QAM    morphine 2 mg/mL injection 2 mg  2 mg Intravenous Q4H PRN    nitroglycerin (NITROSTAT) SL tablet 0 4 mg  0 4 mg Sublingual Q5 Min PRN    ondansetron (ZOFRAN) injection 4 mg  4 mg Intravenous Q6H PRN    oxyCODONE (ROXICODONE) IR tablet 5 mg  5 mg Oral Q4H PRN    senna (SENOKOT) tablet 8 6 mg  1 tablet Oral HS Physical Exam:    General appearance: alert, appears stated age and cooperative  Skin: Skin color, texture, turgor normal  No rashes or lesions  Neurologic: Grossly normal  Neck: Incision CDI, no hematoma or bleeding  Lungs: clear to auscultation bilaterally  Heart: regular rate and rhythm and S1, S2 normal  Abdomen: soft, non-tender; bowel sounds normal; no masses,  no organomegaly  Extremities: extremities normal, atraumatic, no cyanosis or edema    Keila Anaya PA-C  1/30/2017

## 2017-01-30 NOTE — PLAN OF CARE
CARDIOVASCULAR - ADULT     Maintains optimal cardiac output and hemodynamic stability Progressing        DISCHARGE PLANNING - CARE MANAGEMENT     Discharge to post-acute care or home with appropriate resources Progressing        INFECTION - ADULT     Absence or prevention of progression during hospitalization Progressing        NEUROSENSORY - ADULT     Achieves stable or improved neurological status Progressing        PAIN - ADULT     Verbalizes/displays adequate comfort level or baseline comfort level Progressing        Potential for Falls     Patient will remain free of falls Progressing        Prexisting or High Potential for Compromised Skin Integrity     Skin integrity is maintained or improved Progressing        SKIN/TISSUE INTEGRITY - ADULT     Skin integrity remains intact Progressing     Incision(s), wounds(s) or drain site(s) healing without S/S of infection Progressing

## 2017-01-31 PROCEDURE — A9270 NON-COVERED ITEM OR SERVICE: HCPCS | Performed by: PHYSICIAN ASSISTANT

## 2017-01-31 PROCEDURE — A9270 NON-COVERED ITEM OR SERVICE: HCPCS | Performed by: SURGERY

## 2017-01-31 PROCEDURE — A9270 NON-COVERED ITEM OR SERVICE: HCPCS | Performed by: NURSE PRACTITIONER

## 2017-01-31 RX ORDER — CALCIUM CARBONATE 200(500)MG
500 TABLET,CHEWABLE ORAL DAILY PRN
Status: DISCONTINUED | OUTPATIENT
Start: 2017-01-31 | End: 2017-02-01 | Stop reason: HOSPADM

## 2017-01-31 RX ADMIN — HEPARIN SODIUM 5000 UNITS: 5000 INJECTION, SOLUTION INTRAVENOUS; SUBCUTANEOUS at 06:29

## 2017-01-31 RX ADMIN — HEPARIN SODIUM 5000 UNITS: 5000 INJECTION, SOLUTION INTRAVENOUS; SUBCUTANEOUS at 21:51

## 2017-01-31 RX ADMIN — ALLOPURINOL 300 MG: 300 TABLET ORAL at 08:29

## 2017-01-31 RX ADMIN — SENNOSIDES 8.6 MG: 8.6 TABLET, FILM COATED ORAL at 21:51

## 2017-01-31 RX ADMIN — DOCUSATE SODIUM 100 MG: 100 CAPSULE, LIQUID FILLED ORAL at 08:28

## 2017-01-31 RX ADMIN — ASPIRIN 81 MG 81 MG: 81 TABLET ORAL at 08:29

## 2017-01-31 RX ADMIN — CLOPIDOGREL BISULFATE 75 MG: 75 TABLET ORAL at 08:28

## 2017-01-31 RX ADMIN — Medication 500 MG: at 12:00

## 2017-01-31 RX ADMIN — ATORVASTATIN CALCIUM 20 MG: 20 TABLET, FILM COATED ORAL at 17:07

## 2017-01-31 RX ADMIN — DOCUSATE SODIUM 100 MG: 100 CAPSULE, LIQUID FILLED ORAL at 17:07

## 2017-01-31 RX ADMIN — HEPARIN SODIUM 5000 UNITS: 5000 INJECTION, SOLUTION INTRAVENOUS; SUBCUTANEOUS at 13:43

## 2017-01-31 RX ADMIN — METOPROLOL SUCCINATE 200 MG: 100 TABLET, EXTENDED RELEASE ORAL at 08:29

## 2017-01-31 NOTE — PLAN OF CARE
Problem: Potential for Falls  Goal: Patient will remain free of falls  INTERVENTIONS:  - Assess patient frequently for physical needs  - Identify cognitive and physical deficits and behaviors that affect risk of falls    - [place] fall precautions as indicated by assessment   - Educate patient/family on patient safety including physical limitations  - Instruct patient to call for assistance with activity based on assessment  - Modify environment to reduce risk of injury  - Consider OT/PT consult to assist with strengthening/mobility   Outcome: Progressing    Problem: PAIN - ADULT  Goal: Verbalizes/displays adequate comfort level or baseline comfort level  Interventions:  - Encourage patient to monitor pain and request assistance  - Assess pain using appropriate pain scale  - Administer analgesics based on type and severity of pain and evaluate response  - Implement non-pharmacological measures as appropriate and evaluate response  - Consider cultural and social influences on pain and pain management  - Notify physician/advanced practitioner if interventions unsuccessful or patient reports new pain   Outcome: Progressing    Problem: INFECTION - ADULT  Goal: Absence or prevention of progression during hospitalization  INTERVENTIONS:  - Assess and monitor for signs and symptoms of infection  - Monitor lab/diagnostic results  - Monitor all insertion sites, i e  indwelling lines, tubes, and drains  - Monitor endotracheal (as able) and nasal secretions for changes in amount and color  - [place] appropriate cooling/warming therapies per order  - Administer medications as ordered  - Instruct and encourage patient and family to use good hand hygiene technique  - Identify and instruct in appropriate isolation precautions for identified infection/condition   Outcome: Completed Date Met:  01/31/17    Problem: NEUROSENSORY - ADULT  Goal: Achieves stable or improved neurological status  INTERVENTIONS  - Monitor and report changes in neurological status  - Initiate measures to prevent increased intracranial pressure  - Maintain blood pressure and fluid volume within ordered parameters to optimize cerebral perfusion  - Monitor temperature, glucose, and sodium or any other associated labs  Initiate appropriate interventions as ordered  - Monitor for seizure activity   - Administer anti-seizure medications as ordered   Outcome: Completed Date Met:  01/31/17    Problem: CARDIOVASCULAR - ADULT  Goal: Maintains optimal cardiac output and hemodynamic stability  INTERVENTIONS:  - Monitor I/O, vital signs and rhythm  - Monitor for S/S and trends of decreased cardiac output i e  bleeding, hypotension  - Administer and titrate ordered vasoactive medications to optimize hemodynamic stability  - Assess quality of pulses, skin color and temperature  - Assess for signs of decreased coronary artery perfusion - ex   Angina  - Instruct patient to report change in severity of symptoms   Outcome: Progressing    Problem: SKIN/TISSUE INTEGRITY - ADULT  Goal: Skin integrity remains intact  INTERVENTIONS  - Identify patients at risk for skin breakdown  - Assess and monitor skin integrity  - Assess and monitor nutrition and hydration status  - Monitor labs (i e  albumin)  - Assess for incontinence   - Turn and reposition patient  - Assist with mobility/ambulation  - Relieve pressure over bony prominences  - Avoid friction and shearing  - Provide appropriate hygiene as needed including keeping skin clean and dry  - Evaluate need for skin moisturizer/barrier cream  - Collaborate with interdisciplinary team (i e  Nutrition, Rehabilitation, etc )   - Patient/family teaching   Outcome: Progressing  Goal: Incision(s), wounds(s) or drain site(s) healing without S/S of infection  INTERVENTIONS  - Assess and document risk factors for skin impairment   - Assess and document dressing, incision, wound bed, drain sites and surrounding tissue  - Initiate Nutrition services consult and/or wound management as needed   Outcome: Progressing    Problem: DISCHARGE PLANNING - CARE MANAGEMENT  Goal: Discharge to post-acute care or home with appropriate resources  INTERVENTIONS:  - Conduct assessment to determine patient/family and health care team treatment goals, and need for post-acute services based on payer coverage, community resources, and patient preferences, and barriers to discharge  - Address psychosocial, clinical, and financial barriers to discharge as identified in assessment in conjunction with the patient/family and health care team  - Arrange appropriate level of post-acute services according to patients needs and preference and payer coverage in collaboration with the physician and health care team  - Communicate with and update the patient/family, physician, and health care team regarding progress on the discharge plan  - Arrange appropriate transportation to post-acute venues    Plan return home with sons, [facility] to [facility]  Independent ADLS     Outcome: Progressing    Problem: Prexisting or High Potential for Compromised Skin Integrity  Goal: Skin integrity is maintained or improved  INTERVENTIONS:  - Identify patients at risk for skin breakdown  - Assess and monitor skin integrity  - Assess and monitor nutrition and hydration status  - Monitor labs (i e  albumin)  - Assess for incontinence   - Turn and reposition patient  - Assist with mobility/ambulation  - Relieve pressure over bony prominences  - Avoid friction and shearing  - Provide appropriate hygiene as needed including keeping skin clean and dry  - Evaluate need for skin moisturizer/barrier cream  - Collaborate with interdisciplinary team (i e  Nutrition, Rehabilitation, etc )   - Patient/family teaching   Outcome: Progressing

## 2017-01-31 NOTE — PROGRESS NOTES
Paged blue surgery about pt being in rapid a fib for the past 15 min, but then pt converted on their own  No new orders at this time  Will continue to monitor closely

## 2017-01-31 NOTE — PROGRESS NOTES
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 s/p right carotid endarterectomy with right retrograde innominate artery stenting      Plan:  Cardiac diet   F/U cardiolgoy for discharge planning   - echo completed and reviewed evidence of EF 65% with no regional wall motion abnormalities, grade 2 diastolic   - currently in normal sinus, will monitor as patient has converted into a fib and converted back spontaneously x2   DVT ppx SQH  Cont ASA and plavix  PT/OT     Subjective/Objective   Chief Complaint:     Subjective: noted to be in a fib again last night, converted spontaneously  Patient has symptoms of palpitations that preceeds this occurring  No chest pain  No nausea/vomiting  No neurological symptoms  Objective:     Blood pressure 110/58, pulse 57, temperature 98 1 °F (36 7 °C), temperature source Oral, resp  rate 18, height 5' 3" (1 6 m), weight 91 3 kg (201 lb 4 5 oz), SpO2 92 %  ,Body mass index is 35 66 kg/(m^2)  Intake/Output Summary (Last 24 hours) at 01/31/17 0536  Last data filed at 01/31/17 0505   Gross per 24 hour   Intake             1180 ml   Output             1400 ml   Net             -220 ml       Invasive Devices     Peripheral Intravenous Line            Peripheral IV 01/20/17 Left Forearm 10 days                Physical Exam: General: AAOx3  Neck: incision c/d/i   Neurological exam: GCS 15, non--focal   Respiratory: BS b/l  Abdomen: Soft, NT, no rebound/guarding  Heart: RRR, S1s2  Ext: Warm no cyanosis       Lab, Imaging and other studies:  I have personally reviewed pertinent lab results    , CBC:   Lab Results   Component Value Date    WBC 10 32 (H) 01/30/2017    HGB 8 9 (L) 01/30/2017    HCT 26 7 (L) 01/30/2017    MCV 90 01/30/2017     01/30/2017    MCH 30 0 01/30/2017    MCHC 33 3 01/30/2017    RDW 14 3 01/30/2017    MPV 10 5 01/30/2017    NRBC 0 01/30/2017   , CMP:   Lab Results   Component Value Date     01/30/2017    K 3 9 01/30/2017     01/30/2017    CO2 22 01/30/2017    ANIONGAP 11 01/30/2017    BUN 27 (H) 01/30/2017    CREATININE 1 11 01/30/2017    GLUCOSE 131 01/30/2017    CALCIUM 8 3 01/30/2017    EGFR 47 7 01/30/2017     VTE Pharmacologic Prophylaxis: Sequential compression device (Venodyne)  and Heparin  VTE Mechanical Prophylaxis: sequential compression device

## 2017-01-31 NOTE — PROGRESS NOTES
Cardiology Progress Note - Kennedy Mcclellan 67 y o  female MRN: [id number]    Unit/Bed#: [facility] 420-01 Encounter: [id number]      Assessment/Recommendations:  1) Right carotid stenosis: s/p endarterectomy  Continued on ASA, statin, plavix  2) Type 2 NSTEMI: with EKG changes and hypotension in that setting  Troponin trending down  Echo revealed normal wall motion  Treating conservatively with ASA, statin, plavix, and B-blocker  3) HTN: well controlled, continue current regimen  4) HLD: continued on statin  5) PAD  7) h/o CAD/CABG and PCI: continue medical management as above  8) s/p 2 prior AVR: normally functioning valve on echo  9) Afib with RVR: now back in SR, continue B-blocker  Very brief episode lasting about 2-3 hours, then 20 mins last night  Likely in post-op period, no further work-up or change in management for now  Will likely do Holter as outpatient in 6-8 weeks  Subjective:   Patient seen and examined  No significant events overnight   ; pertinent negatives - chest pain, chest pressure/discomfort, irregular heart beat and palpitations  Objective:     Vitals: Blood pressure 124/63, pulse 73, temperature 98 3 °F (36 8 °C), temperature source Oral, resp  rate 20, height 5' 3" (1 6 m), weight 91 3 kg (201 lb 4 5 oz), SpO2 94 %  , Body mass index is 35 66 kg/(m^2)  ,   Orthostatic Blood Pressures         Most Recent Value    Blood Pressure  124/63 [Map 86] filed at 01/31/2017 0720    Patient Position  Lying filed at 01/31/2017 0300            Intake/Output Summary (Last 24 hours) at 01/31/17 1014  Last data filed at 01/31/17 0848   Gross per 24 hour   Intake              820 ml   Output             1300 ml   Net             -480 ml       TELE: Continues in SR, 20 mins of rapid afib last night      Physical Exam:    GEN: Kennedy Mcclellan appears well, alert and oriented x 3, pleasant and cooperative   HEENT: pupils equal, round, and reactive to light; extraocular muscles intact  NECK: supple, no carotid bruits   HEART: regular rhythm, normal S1 and S2, early-peaking systolic murmur, no clicks, gallops or rubs   LUNGS: clear to auscultation bilaterally; no wheezes, rales, or rhonchi   ABDOMEN: normal bowel sounds, soft, no tenderness, no distention  EXTREMITIES: peripheral pulses normal; no clubbing, cyanosis, (+) bilateral edema  NEURO: no focal findings   SKIN: normal without suspicious lesions on exposed skin    Medications:      Current Facility-Administered Medications:     acetaminophen (TYLENOL) tablet 650 mg, 650 mg, Oral, Q6H PRN, Edilia Ruiz PA-C, 650 mg at 01/30/17 2206    allopurinol (ZYLOPRIM) tablet 300 mg, 300 mg, Oral, Daily, Edilia Ruiz PA-C, 300 mg at 01/31/17 2711    aspirin chewable tablet 81 mg, 81 mg, Oral, Daily, Juve Singleton MD, 81 mg at 01/31/17 9159    atorvastatin (LIPITOR) tablet 20 mg, 20 mg, Oral, QPM, Edilia Ruiz PA-C, 20 mg at 01/30/17 1835    clopidogrel (PLAVIX) tablet 75 mg, 75 mg, Oral, Daily, Edilia Ruiz PA-C, 75 mg at 01/31/17 8985    docusate sodium (COLACE) capsule 100 mg, 100 mg, Oral, BID, Edilia Ruiz PA-C, 100 mg at 01/31/17 5764    heparin (porcine) subcutaneous injection 5,000 Units, 5,000 Units, Subcutaneous, Q8H [address], Zan DMITRI Morales, 5,000 Units at 01/31/17 2410    metoprolol succinate (TOPROL-XL) 24 hr tablet 200 mg, 200 mg, Oral, QAM, Edilia Ruiz PA-C, 200 mg at 01/31/17 2927    morphine 2 mg/mL injection 2 mg, 2 mg, Intravenous, Q4H PRN, Edilia Ruiz PA-C, 2 mg at 01/28/17 2005    nitroglycerin (NITROSTAT) SL tablet 0 4 mg, 0 4 mg, Sublingual, Q5 Min PRN, Edilia Ruiz PA-C    ondansetron [facility]) injection 4 mg, 4 mg, Intravenous, Q6H PRN, Edilia Ruiz PA-C, 4 mg at 01/29/17 2158    oxyCODONE (ROXICODONE) IR tablet 5 mg, 5 mg, Oral, Q4H PRN, Edilia Ruiz PA-C, 5 mg at 01/29/17 0431    senna (SENOKOT) tablet 8 6 mg, 1 tablet, Oral, HS, Kinjal Manznao, CRNP, 8 6 mg at 01/30/17 2206     Labs & Results:      Results from last 7 days  Lab Units 01/29/17  0433 01/28/17  2214 01/28/17  1258   TROPONIN I ng/mL 1 42* 2 15* 3 68*       Results from last 7 days  Lab Units 01/30/17  0631 01/29/17  0433 01/28/17  0557   WBC Thousand/uL 10 32* 11 70* 9 39   HEMOGLOBIN g/dL 8 9* 9 5* 9 4*   HEMATOCRIT % 26 7* 28 9* 28 7*   PLATELETS Thousands/uL 155 142* 140*           Results from last 7 days  Lab Units 01/30/17  0631 01/29/17  0433 01/28/17  0557   SODIUM mmol/L 137 137 141   POTASSIUM mmol/L 3 9 4 1 4 3   CHLORIDE mmol/L 104 105 110*   CO2 mmol/L 22 22 24   BUN mg/dL 27* 26* 28*   CREATININE mg/dL 1 11 1 32* 1 26   CALCIUM mg/dL 8 3 8 0* 7 8*   GLUCOSE RANDOM mg/dL 131 118 112           Results from last 7 days  Lab Units 01/30/17  0631 01/29/17  1847 01/29/17  0433   MAGNESIUM mg/dL 2 4 3 0* 1 2*       Echo:  LEFT VENTRICLE:  Systolic function was normal  Ejection fraction was estimated to be 65 %  There were no regional wall motion abnormalities  Wall thickness was mildly increased  There was mild concentric hypertrophy  Features were consistent with a pseudonormal left ventricular filling pattern, with concomitant abnormal relaxation and increased filling pressure (grade 2 diastolic dysfunction)      LEFT ATRIUM:  The atrium was mildly to moderately dilated      MITRAL VALVE:  There was moderate annular calcification  There was moderate regurgitation      AORTIC VALVE:  A bioprosthesis was present  It exhibited normal function      TRICUSPID VALVE:  There was moderate regurgitation  Estimated peak PA pressure was 55 mmHg  The findings suggest moderate pulmonary hypertension      PULMONIC VALVE:  There was trace regurgitation  EKG personally reviewed by Sai Penn MD      Counseling / Coordination of Care  Total floor / unit time spent today 30 minutes    Greater than 50% of total time was spent with the patient and / or family counseling and / or coordination of care

## 2017-02-01 VITALS
HEART RATE: 62 BPM | HEIGHT: 63 IN | OXYGEN SATURATION: 94 % | DIASTOLIC BLOOD PRESSURE: 62 MMHG | SYSTOLIC BLOOD PRESSURE: 131 MMHG | RESPIRATION RATE: 20 BRPM | WEIGHT: 201.28 LBS | TEMPERATURE: 97.6 F | BODY MASS INDEX: 35.66 KG/M2

## 2017-02-01 LAB
ANION GAP SERPL CALCULATED.3IONS-SCNC: 7 MMOL/L (ref 4–13)
BUN SERPL-MCNC: 26 MG/DL (ref 5–25)
CALCIUM SERPL-MCNC: 8.9 MG/DL (ref 8.3–10.1)
CHLORIDE SERPL-SCNC: 106 MMOL/L (ref 100–108)
CO2 SERPL-SCNC: 26 MMOL/L (ref 21–32)
CREAT SERPL-MCNC: 1.03 MG/DL (ref 0.6–1.3)
ERYTHROCYTE [DISTWIDTH] IN BLOOD BY AUTOMATED COUNT: 14.3 % (ref 11.6–15.1)
GFR SERPL CREATININE-BSD FRML MDRD: 52 ML/MIN/1.73SQ M
GLUCOSE SERPL-MCNC: 114 MG/DL (ref 65–140)
HCT VFR BLD AUTO: 28.2 % (ref 34.8–46.1)
HGB BLD-MCNC: 9.1 G/DL (ref 11.5–15.4)
MCH RBC QN AUTO: 29.4 PG (ref 26.8–34.3)
MCHC RBC AUTO-ENTMCNC: 32.3 G/DL (ref 31.4–37.4)
MCV RBC AUTO: 91 FL (ref 82–98)
PLATELET # BLD AUTO: 204 THOUSANDS/UL (ref 149–390)
PMV BLD AUTO: 10.4 FL (ref 8.9–12.7)
POTASSIUM SERPL-SCNC: 3.8 MMOL/L (ref 3.5–5.3)
RBC # BLD AUTO: 3.1 MILLION/UL (ref 3.81–5.12)
SODIUM SERPL-SCNC: 139 MMOL/L (ref 136–145)
WBC # BLD AUTO: 6.54 THOUSAND/UL (ref 4.31–10.16)

## 2017-02-01 PROCEDURE — A9270 NON-COVERED ITEM OR SERVICE: HCPCS | Performed by: PHYSICIAN ASSISTANT

## 2017-02-01 PROCEDURE — 85027 COMPLETE CBC AUTOMATED: CPT | Performed by: PHYSICIAN ASSISTANT

## 2017-02-01 PROCEDURE — A9270 NON-COVERED ITEM OR SERVICE: HCPCS | Performed by: SURGERY

## 2017-02-01 PROCEDURE — 80048 BASIC METABOLIC PNL TOTAL CA: CPT | Performed by: PHYSICIAN ASSISTANT

## 2017-02-01 RX ORDER — ASPIRIN 81 MG/1
81 TABLET, CHEWABLE ORAL DAILY
Qty: 30 TABLET | Refills: 0 | COMMUNITY
Start: 2017-02-01 | End: 2021-11-29

## 2017-02-01 RX ADMIN — CLOPIDOGREL BISULFATE 75 MG: 75 TABLET ORAL at 08:34

## 2017-02-01 RX ADMIN — HEPARIN SODIUM 5000 UNITS: 5000 INJECTION, SOLUTION INTRAVENOUS; SUBCUTANEOUS at 05:26

## 2017-02-01 RX ADMIN — DOCUSATE SODIUM 100 MG: 100 CAPSULE, LIQUID FILLED ORAL at 08:34

## 2017-02-01 RX ADMIN — ALLOPURINOL 300 MG: 300 TABLET ORAL at 08:35

## 2017-02-01 RX ADMIN — ASPIRIN 81 MG 81 MG: 81 TABLET ORAL at 08:35

## 2017-02-01 RX ADMIN — METOPROLOL SUCCINATE 200 MG: 100 TABLET, EXTENDED RELEASE ORAL at 08:34

## 2017-02-01 NOTE — PLAN OF CARE
Problem: Potential for Falls  Goal: Patient will remain free of falls  INTERVENTIONS:  - Assess patient frequently for physical needs  - Identify cognitive and physical deficits and behaviors that affect risk of falls    - Foss fall precautions as indicated by assessment   - Educate patient/family on patient safety including physical limitations  - Instruct patient to call for assistance with activity based on assessment  - Modify environment to reduce risk of injury  - Consider OT/PT consult to assist with strengthening/mobility   Outcome: Progressing

## 2017-02-01 NOTE — PLAN OF CARE
Problem: Nutrition/Hydration-ADULT  Goal: Nutrient/Hydration intake appropriate for improving, restoring or maintaining nutritional needs  Monitor and assess patients nutrition/hydration status for malnutrition (ex- brittle hair, bruises, dry skin, pale skin and conjunctiva, muscle wasting, smooth red tongue, and disorientation)  Collaborate with interdisciplinary team and initiate plan and interventions as ordered  Monitor patients weight and dietary intake as ordered or per policy  Utilize nutrition screening tool and intervene per policy  Determine patients food preferences and provide high-protein, high-caloric foods as appropriate       INTERVENTIONS:  - Monitor oral intake, urinary output, labs, and treatment plans  - Assess nutrition and hydration status and recommend course of action  - Evaluate amount of meals eaten  - Assist patient with eating if necessary   - Allow adequate time for meals  - Recommend/ encourage appropriate diets, oral nutritional supplements, and vitamin/mineral supplements  - Order, calculate, and assess calorie counts as needed  - Recommend, monitor, and adjust tube feedings and TPN/PPN based on assessed needs  - Assess need for intravenous fluids  - Provide specific nutrition/hydration education as appropriate  - Include patient/family/caregiver in decisions related to nutrition  Outcome: Progressing

## 2017-02-01 NOTE — PLAN OF CARE
CARDIOVASCULAR - ADULT     Maintains optimal cardiac output and hemodynamic stability Progressing        DISCHARGE PLANNING - CARE MANAGEMENT     Discharge to post-acute care or home with appropriate resources Progressing        PAIN - ADULT     Verbalizes/displays adequate comfort level or baseline comfort level Progressing        Potential for Falls     Patient will remain free of falls Progressing        Prexisting or High Potential for Compromised Skin Integrity     Skin integrity is maintained or improved Progressing        SKIN/TISSUE INTEGRITY - ADULT     Skin integrity remains intact Progressing     Incision(s), wounds(s) or drain site(s) healing without S/S of infection Progressing

## 2017-02-01 NOTE — DISCHARGE SUMMARY
Discharge Summary    Talha Aguayo 67 y o  female MRN: [id number]    Unit/Bed#: [facility] 420-01 Encounter: [id number]    Admission Date: 1/27/2017     Discharge Date: 2/1/2017    Attending: Dr Madhuri Martines    Consultants: Susana Zayas    Admitting Diagnosis: Occlusion and stenosis of bilateral carotid arteries [I65 23]  Stricture of artery [I77 1]    Principle Diagnosis: Asymptomatic Right Carotid Stenosis    Secondary Diagnosis:    · NSTEMI type 2-demand ischemia 2/2 hypotension  · Postoperative A-fib  · Acute blood loss anemia    Past Medical History   Diagnosis Date    Aortic valve stenosis     Aorto-iliac disease     Carotid stenosis     Cataract of right eye 10/25/2016    Cataract, left eye 10/18/2016    Chronic systolic CHF (congestive heart failure)     CKD (chronic kidney disease) stage 3, GFR 30-59 ml/min     Coronary artery disease     Gout     Hyperlipidemia     Hypertension     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Right    PAD (peripheral artery disease)     Renal artery stenosis      Right     Past Surgical History   Procedure Laterality Date    Abdominal aortic aneurysm repair      Pr remv cataract extracap,insert lens Left 10/18/2016     Procedure: OS EXTRACTION EXTRACAPSULAR CATARACT PHACO INTRAOCULAR LENS (IOL); Surgeon: Corina Montero MD;  Location: [facility];  Service: Ophthalmology    Pr remv cataract extracap,insert lens Right 10/25/2016     Procedure: OD EXTRACTION EXTRACAPSULAR CATARACT PHACO INTRAOCULAR LENS (IOL);   Surgeon: Corina Montero MD;  Location: [facility];  Service: Ophthalmology    Cataract extraction      Vascular surgery      Hysterectomy      Coronary artery bypass graft      Coronary stent placement      Cystoscopy      Aortic valve replacement      Pr thromboendartectmy neck,neck incis Right 1/27/2017     Procedure: CAROTID ENDARTERECTOMY WITH RETROGRADE AND INNOMINATE ARTERY STENTING;  Surgeon: Bard Luz MD;  Location: [facility];  Service: Vascular Procedures Performed:  Right Carotid Endarterectomy w/ Right retrograde innominate artery stenting 1/27    Discharge Medications:  See after visit summary for reconciled discharge medications provided to patient and family  Hospital Course: 76-year-old female admitted for treatment of asymptomatic right carotid stenosis  She underwent right carotid endarterectomy with right retrograde innominate artery stenting by Dr Contreras Solano on 1/27/17  In the postanesthesia care unit she developed hypotension and subsequently diffuse chest pressure with ST segment depression on EKG  Cardiology was consult in for evaluation at that time  Her troponins were found to be elevated and peaked at 3 68  Cardiology's impression was that this was a type II NSTEMI Secondary to demand ischemia from hypotension  She was transferred to stepdown unit for monitoring  Her troponins improved and she had no further chest pain  On POD #2 she had an episode of atrial fibrillation with RVR and on POD #3 and another brief episode of A  Fib  Echocardiogram was performed on 1/30/17 revealed an ejection fraction of 65% with no wall motion abnormalities, diastolic dysfunction grade 2, moderate MR, AVR bioprosthesis, moderate TR and moderate pulmonary artery hypertension  Cardiology recommendations were to proceed with the current medical regimen  They did not feel it necessary to initiate anticoagulation  She will follow-up with cardiology as an outpatient and may require Holter monitor in 6-8 weeks  From a neurovascular perspective she remained stable through her postoperative course  She was deemed appropriate for discharge home on POD #5  Condition at Discharge: stable     Provisions for Follow-Up Care:  See after visit summary for information related to follow-up care and any pertinent home health orders        Disposition: Home    Discharge instructions/Information to patient and family:   See after visit summary for information provided to patient and family  Planned Readmission: No    Discharge Statement   I spent 30 minutes discharging the patient  This time was spent on the day of discharge  I had direct contact with the patient on the day of discharge  Additional documentation is required if more than 30 minutes were spent on discharge       Yanet Donnelly PA-C  2/1/2017

## 2017-02-01 NOTE — PROGRESS NOTES
Cardiology Progress Note - Chandrika Chacon 67 y o  female MRN: [id number]    Unit/Bed#: [facility] 420-01 Encounter: [id number]      Assessment/Recommendations:  1) Right carotid stenosis: s/p endarterectomy  Continued on ASA, statin, plavix  2) Type 2 NSTEMI: with EKG changes and hypotension in that setting  Troponin trending down  Echo revealed normal wall motion  Treating conservatively with ASA, statin, plavix, and B-blocker  3) HTN: well controlled, continue current regimen  4) HLD: continued on statin  5) PAD  7) h/o CAD/CABG and PCI: continue medical management as above  8) s/p 2 prior AVR: normally functioning valve on echo  9) Afib with RVR: now back in SR, continue B-blocker  Very brief episode lasting about 2-3 hours, then 20 mins  Likely in post-op period, no further work-up or change in management for now  Will likely do Holter as outpatient in 6-8 weeks  Outpatient follow-up in 2-3 weeks  Stable from cardiac standpoint for discharge  Subjective:   Patient seen and examined  No significant events overnight   ; pertinent negatives - chest pain, chest pressure/discomfort, irregular heart beat and palpitations  Objective:     Vitals: Blood pressure 131/62, pulse 62, temperature 97 6 °F (36 4 °C), temperature source Oral, resp  rate 20, height 5' 3" (1 6 m), weight 91 3 kg (201 lb 4 5 oz), SpO2 94 %  , Body mass index is 35 66 kg/(m^2)  ,   Orthostatic Blood Pressures         Most Recent Value    Blood Pressure  131/62 [Map 69] filed at 02/01/2017 8744    Patient Position  Sitting filed at 02/01/2017 0258            Intake/Output Summary (Last 24 hours) at 02/01/17 0958  Last data filed at 02/01/17 0946   Gross per 24 hour   Intake              890 ml   Output             1450 ml   Net             -560 ml       TELE: Continues in SR    Physical Exam:    GEN: Chandrika Chacon appears well, alert and oriented x 3, pleasant and cooperative   HEENT: pupils equal, round, and reactive to light; extraocular muscles intact  NECK: supple, no carotid bruits   HEART: regular rhythm, normal S1 and S2, early-peaking systolic murmur, no clicks, gallops or rubs   LUNGS: clear to auscultation bilaterally; no wheezes, rales, or rhonchi   ABDOMEN: normal bowel sounds, soft, no tenderness, no distention  EXTREMITIES: peripheral pulses normal; no clubbing, cyanosis, (+) bilateral edema  NEURO: no focal findings   SKIN: normal without suspicious lesions on exposed skin    Medications:      Current Facility-Administered Medications:     acetaminophen (TYLENOL) tablet 650 mg, 650 mg, Oral, Q6H PRN, Cleaster Nelson, PA-C, 650 mg at 17 2206    allopurinol (ZYLOPRIM) tablet 300 mg, 300 mg, Oral, Daily, Cleaster Nelson, PA-C, 300 mg at 17 0770    aspirin chewable tablet 81 mg, 81 mg, Oral, Daily, Miya Parker MD, 81 mg at 17 3760    atorvastatin (LIPITOR) tablet 20 mg, 20 mg, Oral, QPM, Darryl Damon, PA-C, 20 mg at 17 1707    calcium carbonate (TUMS) chewable tablet 500 mg, 500 mg, Oral, Daily PRN, Reyes Lo MD, 500 mg at 17 1200    clopidogrel (PLAVIX) tablet 75 mg, 75 mg, Oral, Daily, Cleaster Nelson, PA-C, 75 mg at 17 9824    docusate sodium (COLACE) capsule 100 mg, 100 mg, Oral, BID, Darryl Damon, PA-C, 100 mg at 17 6180    heparin (porcine) subcutaneous injection 5,000 Units, 5,000 Units, Subcutaneous, Q8H [address], Kevan Comings, PA-C, 5,000 Units at 17 0526    metoprolol succinate (TOPROL-XL) 24 hr tablet 200 mg, 200 mg, Oral, QAM, Cleaster Nelson, PA-C, 200 mg at 17 0834    morphine 2 mg/mL injection 2 mg, 2 mg, Intravenous, Q4H PRN, Darryl Damon, PA-C, 2 mg at 17    nitroglycerin (NITROSTAT) SL tablet 0 4 mg, 0 4 mg, Sublingual, Q5 Min PRN, Darryl Damon PA-C    ondansetron [facility]) injection 4 mg, 4 mg, Intravenous, Q6H PRN, Darryl Damon PA-C, 4 mg at 17 2158    oxyCODONE (ROXICODONE) IR tablet 5 mg, 5 mg, Oral, Q4H PRN, Maurizio Anderson PA-C, 5 mg at 01/29/17 0431    senna (SENOKOT) tablet 8 6 mg, 1 tablet, Oral, HS, Kevin Gonzales Alejandra, EVITA, 8 6 mg at 01/31/17 2151     Labs & Results:      Results from last 7 days  Lab Units 01/29/17  0433 01/28/17  2214 01/28/17  1258   TROPONIN I ng/mL 1 42* 2 15* 3 68*       Results from last 7 days  Lab Units 02/01/17  0603 01/30/17  0631 01/29/17  0433   WBC Thousand/uL 6 54 10 32* 11 70*   HEMOGLOBIN g/dL 9 1* 8 9* 9 5*   HEMATOCRIT % 28 2* 26 7* 28 9*   PLATELETS Thousands/uL 204 155 142*           Results from last 7 days  Lab Units 02/01/17  0603 01/30/17  0631 01/29/17  0433   SODIUM mmol/L 139 137 137   POTASSIUM mmol/L 3 8 3 9 4 1   CHLORIDE mmol/L 106 104 105   CO2 mmol/L 26 22 22   BUN mg/dL 26* 27* 26*   CREATININE mg/dL 1 03 1 11 1 32*   CALCIUM mg/dL 8 9 8 3 8 0*   GLUCOSE RANDOM mg/dL 114 131 118           Results from last 7 days  Lab Units 01/30/17  0631 01/29/17  1847 01/29/17  0433   MAGNESIUM mg/dL 2 4 3 0* 1 2*       Echo:  LEFT VENTRICLE:  Systolic function was normal  Ejection fraction was estimated to be 65 %  There were no regional wall motion abnormalities  Wall thickness was mildly increased  There was mild concentric hypertrophy  Features were consistent with a pseudonormal left ventricular filling pattern, with concomitant abnormal relaxation and increased filling pressure (grade 2 diastolic dysfunction)      LEFT ATRIUM:  The atrium was mildly to moderately dilated      MITRAL VALVE:  There was moderate annular calcification  There was moderate regurgitation      AORTIC VALVE:  A bioprosthesis was present  It exhibited normal function      TRICUSPID VALVE:  There was moderate regurgitation  Estimated peak PA pressure was 55 mmHg  The findings suggest moderate pulmonary hypertension      PULMONIC VALVE:  There was trace regurgitation      EKG personally reviewed by Collette Nail, MD      Counseling / Coordination of Care  Total floor / unit time spent today 30 minutes  Greater than 50% of total time was spent with the patient and / or family counseling and / or coordination of care

## 2017-02-01 NOTE — PROGRESS NOTES
Progress Note - General Surgery   Ilya Funk 67 y o  female MRN: [id number]  Unit/Bed#: [facility] 420-01 Encounter: [id number]       Assessment:  66 yo F with right carotid stenosis and right innominate artery stenosis s/p right carotid endarterectomy with right retrograde innominate artery stenting       Plan:  Cardiac diet   From a cardiovascular standpoint: no further change  - no anticoagulation at this time, likely holter monitor as an outpatient i 6-8 weeks   - currently in normal sinus, will monitor    DVT ppx SQH  Cont ASA and plavix  PT/OT     Subjective/Objective   Chief Complaint:     Subjective: no overnigth evenets  No further episodes of a fib  No nausea/vomiting  No neurological changes     Objective:     Blood pressure 116/56, pulse 69, temperature 98 2 °F (36 8 °C), temperature source Oral, resp  rate 18, height 5' 3" (1 6 m), weight 91 3 kg (201 lb 4 5 oz), SpO2 94 %  ,Body mass index is 35 66 kg/(m^2)  Intake/Output Summary (Last 24 hours) at 02/01/17 5152  Last data filed at 02/01/17 0501   Gross per 24 hour   Intake              640 ml   Output             1780 ml   Net            -1140 ml       Invasive Devices     Peripheral Intravenous Line            Peripheral IV 01/31/17 Left Antecubital less than 1 day              Physical Exam: General: AAOx3  Respiratory: BS b/l  Abdomen: Soft, NT, no rebound/guarding  Incision c/d/i  Heart: RRR, S1s2  Ext: Warm no cyanosis       Lab, Imaging and other studies:I have personally reviewed pertinent lab results    , CBC: No results found for: WBC, HGB, HCT, MCV, PLT, ADJUSTEDWBC, MCH, MCHC, RDW, MPV, NRBC, CMP: No results found for: NA, K, CL, CO2, ANIONGAP, BUN, CREATININE, GLUCOSE, CALCIUM, AST, ALT, ALKPHOS, PROT, ALBUMIN, BILITOT, EGFR  VTE Pharmacologic Prophylaxis: Sequential compression device (Venodyne)  and Heparin  VTE Mechanical Prophylaxis: sequential compression device

## 2017-02-04 LAB
ATRIAL RATE: 53 BPM
ATRIAL RATE: 56 BPM
P AXIS: 40 DEGREES
P AXIS: 45 DEGREES
PR INTERVAL: 158 MS
PR INTERVAL: 158 MS
QRS AXIS: 39 DEGREES
QRS AXIS: 39 DEGREES
QRSD INTERVAL: 90 MS
QRSD INTERVAL: 90 MS
QT INTERVAL: 444 MS
QT INTERVAL: 454 MS
QTC INTERVAL: 416 MS
QTC INTERVAL: 438 MS
T WAVE AXIS: 28 DEGREES
T WAVE AXIS: 36 DEGREES
VENTRICULAR RATE: 53 BPM
VENTRICULAR RATE: 56 BPM

## 2017-02-13 ENCOUNTER — ALLSCRIPTS OFFICE VISIT (OUTPATIENT)
Dept: OTHER | Facility: OTHER | Age: 77
End: 2017-02-13

## 2017-02-16 ENCOUNTER — ALLSCRIPTS OFFICE VISIT (OUTPATIENT)
Dept: OTHER | Facility: OTHER | Age: 77
End: 2017-02-16

## 2017-04-06 DIAGNOSIS — I65.23 OCCLUSION AND STENOSIS OF BILATERAL CAROTID ARTERIES: ICD-10-CM

## 2017-04-27 ENCOUNTER — HOSPITAL ENCOUNTER (OUTPATIENT)
Dept: NON INVASIVE DIAGNOSTICS | Facility: CLINIC | Age: 77
Discharge: HOME | End: 2017-04-27
Payer: COMMERCIAL

## 2017-04-27 DIAGNOSIS — I77.1 STRICTURE OF ARTERY (HCC): ICD-10-CM

## 2017-04-27 DIAGNOSIS — I65.23 OCCLUSION AND STENOSIS OF BILATERAL CAROTID ARTERIES: ICD-10-CM

## 2017-04-27 PROCEDURE — 93880 EXTRACRANIAL BILAT STUDY: CPT

## 2017-05-15 ENCOUNTER — ALLSCRIPTS OFFICE VISIT (OUTPATIENT)
Dept: OTHER | Facility: OTHER | Age: 77
End: 2017-05-15

## 2017-10-31 ENCOUNTER — HOSPITAL ENCOUNTER (OUTPATIENT)
Dept: NON INVASIVE DIAGNOSTICS | Facility: CLINIC | Age: 77
Discharge: HOME | End: 2017-10-31
Payer: COMMERCIAL

## 2017-10-31 DIAGNOSIS — I77.1 STRICTURE OF ARTERY (HCC): ICD-10-CM

## 2017-10-31 DIAGNOSIS — I65.23 OCCLUSION AND STENOSIS OF BILATERAL CAROTID ARTERIES: ICD-10-CM

## 2017-10-31 PROCEDURE — 93880 EXTRACRANIAL BILAT STUDY: CPT

## 2017-11-06 ENCOUNTER — GENERIC CONVERSION - ENCOUNTER (OUTPATIENT)
Dept: OTHER | Facility: OTHER | Age: 77
End: 2017-11-06

## 2018-01-10 NOTE — PROGRESS NOTES
Preliminary Nursing Report                Patient Information    Initial Encounter Entry Date:   2017 2:43 PM EST (Automated Transmission Automated Transmission)       Last Modified:   {Bettie Aponte}              Legal Name: Dayana Torres [address]kw[address] Number:        YOB: 1940        Age (years): 67        Gender: F        Body Mass Index (BMI): 37 kg/m2        Height: 62 in  Weight: 204 lbs (93 kgs)           Address:   [address] [place] [address]              Phone: [phone]   (consent to leave messages)        Email:        Ethnicity: Decline to State        Hindu:        Marital Status:        Preferred Language: English        Race: Other Race                    Patient Insurance Information        Primary Insurance Information Carrier Name: {Primary  CarrierName}           Carrier Address:   {Primary  CarrierAddress}              Carrier Phone: {Primary  CarrierPhone}          Group Number: {Primary  GroupNumber}          Policy Number: {Primary  PolicyNumber}          Insured Name: {Primary  InsuredName}          Insured : {Primary  InsuredDOB}          Relationship to Insured: {Primary  RelationshiptoInsured}           Secondary Insurance Information Carrier Name: {Secondary  CarrierName}           Carrier Address:   {Secondary  CarrierAddress}              Carrier Phone: {Secondary  CarrierPhone}          Group Number: {Secondary  GroupNumber}          Policy Number: {Secondary  PolicyNumber}          Insured Name: {Secondary  InsuredName}          Insured : {Secondary  InsuredDOB}          Relationship to Insured: {Secondary  RelationshiptoInsured}                       Health Profile   Booking #:   Cale Rios #: [id number]               DOS: 2017    Surgery :  Thromboendarterectomy, including patch graft, if performed; carotid, vertebral, subclavian, by neck incision    Add'l Procedures/Notes:     Surgery Risk: Major          Precautions 3-vessel CAD       Aortic valve disorder       Aortic valve stenosis       BMI       Type 2 diabetes mellitus                   Allergies    Adhesive Tape       Adhesive Tape TAPE       Clinical Comments: Reaction Type: , Reaction: , Severity:              Medications    Allopurinol 300 MG Oral Tablet       Atorvastatin Calcium 20 MG Oral Tablet       Lisinopril-Hydrochlorothiazide 20-25 MG Oral Tablet       Metoprolol Succinate  MG Oral Tablet Extended Release 24 Hour       N-Acetyl-L-Cysteine 600 MG Oral Capsule       Nitrostat 0 4 MG Sublingual Tablet Sublingual       Plavix 75 MG Oral Tablet               Conditions    3-vessel CAD       Aortic valve disorder       Aortic valve stenosis       Asymptomatic bilateral carotid artery stenosis       Elevated serum creatinine       Gout       Hyperlipidemia       Hypertension       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Limited Exercise Tolerance       Status post aortic valve replacement       Status post coronary artery bypass grafting       Type 2 diabetes mellitus       Vascular occlusion               Family History    None             Surgical History    None             Social History    Denies Alcohol Use (History)       Former smoker       Clinical Comments: Quit 1996;        Marital History -        No drug use                               Patient Instructions       Medical Procedure Risk  NPO Instructions   The day before surgery it is recommended to have a light dinner at your usual time and you are allowed a light snack early in the evening  Do not eat anything heavy or eat a big meal after 7pm  Do not eat or drink anything after midnight prior to your surgery  If you are supposed to take any of your medications, do so with a sip of water  Failure to follow these instructions can lead to an increased risk of lung complications and may result in a delay or cancellation of your procedure   If you have any questions, contact your institution for further instructions  No candy, no gum, no mints, no chewing tobacco          Lisinopril-Hydrochlorothiazide 20-25 MG Oral Tablet  Medication Instruction (ACE/ARB - Blood Pressure Medication) 1  Please continue the following medications up to the evening before surgery, but do not take it on the day of surgery  Please restart your medications as soon as clinically feasible  Type 2 diabetes mellitus  Medication Instruction (Diabetic Medication)   Please decrease your morning insulin dose to one-half of normal dose  If you are taking oral diabetes medications, the morning dose should be omitted  If taking metformin (Glucophage), discontinue the medication for 24 hours prior to surgery  If you have an insulin pump, continue at a basal rate only  Allopurinol 300 MG Oral Tablet  Medication Instruction (Gout Medications) 33  Please continue the following medications up to the evening before surgery, but do not take it on the day of surgery  Testing Considerations       ? Blood Glucose on Day of Surgery t  Please check the blood sugar on the morning of surgery  Triggered by: Type 2 diabetes mellitus         ? Chest X-ray (CXR) t  Consider a Chest X-Ray (CXR) if patient is having respiratory symptoms  Triggered by: Asymptomatic bilateral carotid artery stenosis, Age or Facility Rec         ? Coagulation Tests (PT/PTT/INR) t  Triggered by: Asymptomatic bilateral carotid artery stenosis         ? Complete Blood Count (CBC) t, client, client  If test was completed and normal within last six months, repeat test is not necessary  Triggered by: 3-vessel CAD, Asymptomatic bilateral carotid artery stenosis, Age or Facility Rec         ? Comprehensive Metabolic Panel (CMP) t  If test was completed and normal within last six months, repeat test is not necessary  Triggered by: 3-vessel CAD         ?  Electrocardiogram (ECG) t  Patient does not need new test if normal ECG is present within the last six months and no change in clinical condition  Triggered by: 3-vessel CAD, Aortic valve stenosis, Aortic valve disorder, Asymptomatic bilateral carotid artery stenosis, Type 2 diabetes mellitus, Hypertension, Age or Facility Rec         ? Hemoglobin A1c (HbA1c) client  If test was completed and normal within the last three months, repeat test is not necessary  Triggered by: Type 2 diabetes mellitus, Age or Facility Rec         ? Type and Screen client  Type and Screen - Blood: If there is anticipated or possible large blood loss with this procedure, then a Type and Screen for Blood should be ordered  Triggered by: Age or Facility Rec               Consultations       ? Cardiac Consult (Major MI) c  If the patient has had a Myocardial Infarction within 30 days then a cardiac consult is indicated  Also, if the patient is having increasing frequency or intensity of chest pain then a cardiac consult is indicated  Otherwise, optimization of medical therapy is recommended under the direction of the PCP or cardiologist   Triggered by: 3-vessel CAD         ? Cardiac Consult (Major Valve)   If the patient has moderate or severe valvular stenosis or regurgitation then a cardiac consult is indicated  It is recommended that the patient undergo a preoperative echocardiography if there has been either:  1) no prior echocardiography within 1 year or   2) a significant change in clinical status or physical examination since last evaluation   - For adults who meet standard indications for valvular intervention (replacement and repair) on the basis of symptoms and severity of stenosis or regurgitation, valvular intervention before elective non-cardiac surgery is effective in reducing perioperative risk  - appropriate intraoperative and postoperative hemodynamic monitoring is reasonable in adults with asymptomatic severe valvular disease   - Antibiotic prophylaxis will likely be required  Triggered by:  Aortic valve stenosis, Aortic valve disorder         ? Cardiac Evaluation   Further evaluation of cardiopulmonary status should be strongly considered  Triggered by: Asymptomatic bilateral carotid artery stenosis         ? PCP eval/Echo t  Triggered by: Aortic valve stenosis, Aortic valve disorder         ? Primary Care Physician Evaluation   Primary care physician may need to evaluate patient prior to surgery  This is likely NOT necessary if the listed conditions are chronic and stable  Triggered by: Medical Procedure Risk         ? Cardiac Consult-ACC/AHA Guideline c  If a cardiopulmonary issue is suspected then Beta Blocker Therapy, possible initiation of statins, and further cardiovascular testing are recommended if it will   If patient has limited exercise tolerance, please consider likelihood of this being secondary to other causes (e g  hip or knee pain)  Perioperative initiation of statins may be considered in patients with clinical indications who are undergoing elevated-risk procedures  Triggered by: ACC/AHA Clinical Conditions Risk Factors               Miscellaneous Questions         Question: Are you able to walk up a flight of stairs, walk up a hill or do heavy housework WITHOUT having chest pain or shortness of breath? Answer: NO                   Allergies/Conditions/Medications Not Found        The following were not recognized by our system when generating the recommendations  Please consider if this would impact any preoperative protocols  ? Denies Alcohol Use (History)       ? Marital History -        ? No drug use       ? Status post coronary artery bypass grafting       ? Metoprolol Succinate  MG Oral Tablet Extended Release 24 Hour       ?  Nitrostat 0 4 MG Sublingual Tablet Sublingual       ? Plavix 75 MG Oral Tablet                  Appointment Information         Date:    01/27/2017        Location:    [facility]        Address:           Directions:                      [address] [place] revision [address]      ?? Denotes a free-text entry  Legal Disclaimer: Any and all recommendations and services provided herein are designed to assist in the preoperative care of the patient  Nothing contained herein is designed to replace, eliminate or alleviate the responsibility of the attending physician to supervise and determine the patient?s preoperative care and course of treatment  Failure to provide complete, accurate information may negatively impact the system?s ability to recommend the proper preoperative protocol  THE ATTENDING PHYSICIAN IS RESPONSIBLE TO REVIEW THE SUGGESTED PREOPERATIVE PROTOCOLS/COURSE OF TREATMENT AND PRESCRIBE THE FINAL COURSE OF PREOPERATIVE TREATMENT IN CONSULTATION WITH THE PATIENT  THE ePREOP SYSTEM AND ITS MATERIALS ARE PROVIDED ? AS IS? WITHOUT WARRANTY OF ANY KIND, EXPRESS OR IMPLIED, INCLUDING, BUT NOT LIMITED TO, WARRANTIES OF PERFORMANCE OR MERCHANTABILITY OR FITNESS FOR A PARTICULAR PURPOSE  PATIENT AND PHYSICIANS HEREBY AGREE THAT THEIR USE OF THE MATERIALS AND RESOURCES ACT AS A CONSENT TO RELEASE AND WAIVE ePREOP FROM ANY AND ALL CLAIMS OF WARRANTY, TORT OR CONTRACT LAW OF ANY KIND             Electronically signed by:Dewayne Thomson MD  Jan 24 2017 11:17AM EST

## 2018-01-12 VITALS
DIASTOLIC BLOOD PRESSURE: 72 MMHG | SYSTOLIC BLOOD PRESSURE: 130 MMHG | RESPIRATION RATE: 16 BRPM | HEIGHT: 62 IN | BODY MASS INDEX: 37.54 KG/M2 | HEART RATE: 70 BPM | WEIGHT: 204 LBS

## 2018-01-12 VITALS
WEIGHT: 202 LBS | RESPIRATION RATE: 16 BRPM | HEIGHT: 62 IN | HEART RATE: 74 BPM | BODY MASS INDEX: 37.17 KG/M2 | SYSTOLIC BLOOD PRESSURE: 150 MMHG | DIASTOLIC BLOOD PRESSURE: 80 MMHG

## 2018-01-12 NOTE — H&P
Demographics     Postal Code: [id number]     Admission Date: 1/27/2017     Procedure: CEA     Procedure side: Right     Discharge Status & Location: Alive     Discharge Date:         ICU Stay (day): 0  Preoperative Data     Risk Factors & Systems Review:         Weight Height Units: Metric (cm - kg)        Hypertension: Yes        Smoking: Quit        Hyperlipidemia: Yes     Previous procedures:         Previous Carotid Intervention: Yes           Previous CEA, Right: No           Previous CEA, Left: No           Prev  Carotid Stent - Right: No           Prev   Carotid Stent - Left: No        Bypass: No        CEA: No        Aneurysm Repair: No        PTA/Stent: No        Major Amputation: No        Prev LL Vein Procedure: No  Procedure     Indication: Asymptomatic Carotid Stenosis [433 10]     Incision Time: 12:00:01 AM     Urgency: Elective     Anesthesia:         Anesthesia: General     Estimated blood loss (ml): 0     Procedure Duration (min): 0     Medications, fluids & blood transfusion:         Blood Transfusion: No     Endarterectomy Type: Conventional

## 2018-01-13 NOTE — H&P
Demographics     Postal Code: [id number]     Admission Date: 1/27/2017     Procedure: NORMA     Procedure side: Right     Discharge Status & Location: Alive     Discharge Date:         ICU Stay (day): 0  Preoperative Data     Risk Factors & Systems Review:         Weight Height Units: Metric (cm - kg)        Hypertension: Yes        Smoking: Quit        Hyperlipidemia: Yes     Previous procedures:         Previous Carotid Intervention: Yes           Previous Ipsilateral CEA: No           Previous Contralateral CEA: No           Previous Ipsilateral Carotid Stent: No           Previous Contralateral Carotid Stent: No        Bypass: No        CEA: No        Aneurysm Repair: No        PTA/Stent: No        Major Amputation: No        Prev LL Vein Procedure: No  Procedure     Urgency: Elective     Anesthesia:         Anesthesia: General     Estimated blood loss (ml): 0     Procedure Duration (min): 0     Medications, fluids & blood transfusion:         Blood Transfusion: No

## 2018-01-14 VITALS
RESPIRATION RATE: 16 BRPM | HEART RATE: 72 BPM | SYSTOLIC BLOOD PRESSURE: 128 MMHG | HEIGHT: 62 IN | BODY MASS INDEX: 37.36 KG/M2 | WEIGHT: 203 LBS | DIASTOLIC BLOOD PRESSURE: 72 MMHG

## 2018-01-14 VITALS
HEIGHT: 62 IN | HEART RATE: 72 BPM | SYSTOLIC BLOOD PRESSURE: 100 MMHG | BODY MASS INDEX: 36.7 KG/M2 | DIASTOLIC BLOOD PRESSURE: 60 MMHG | WEIGHT: 199.44 LBS

## 2018-01-22 VITALS
TEMPERATURE: 97.5 F | HEART RATE: 72 BPM | WEIGHT: 208.25 LBS | BODY MASS INDEX: 38.32 KG/M2 | RESPIRATION RATE: 16 BRPM | HEIGHT: 62 IN | DIASTOLIC BLOOD PRESSURE: 80 MMHG | SYSTOLIC BLOOD PRESSURE: 128 MMHG

## 2018-01-22 VITALS — DIASTOLIC BLOOD PRESSURE: 78 MMHG | SYSTOLIC BLOOD PRESSURE: 130 MMHG

## 2018-04-30 DIAGNOSIS — I65.21 CAROTID STENOSIS, RIGHT: Primary | ICD-10-CM

## 2018-04-30 DIAGNOSIS — I65.23 CAROTID STENOSIS, BILATERAL: ICD-10-CM

## 2018-05-02 ENCOUNTER — HOSPITAL ENCOUNTER (OUTPATIENT)
Dept: NON INVASIVE DIAGNOSTICS | Facility: CLINIC | Age: 78
Discharge: HOME | End: 2018-05-02
Payer: COMMERCIAL

## 2018-05-02 DIAGNOSIS — I65.23 CAROTID STENOSIS, BILATERAL: ICD-10-CM

## 2018-05-02 PROCEDURE — 93880 EXTRACRANIAL BILAT STUDY: CPT

## 2018-05-02 PROCEDURE — 93880 EXTRACRANIAL BILAT STUDY: CPT | Performed by: SURGERY

## 2018-07-06 DIAGNOSIS — E78.5 HYPERLIPIDEMIA, UNSPECIFIED HYPERLIPIDEMIA TYPE: Primary | ICD-10-CM

## 2018-07-06 RX ORDER — ATORVASTATIN CALCIUM 20 MG/1
TABLET, FILM COATED ORAL
Qty: 90 TABLET | Refills: 0 | Status: SHIPPED | OUTPATIENT
Start: 2018-07-06 | End: 2018-10-01 | Stop reason: SDUPTHER

## 2018-10-01 DIAGNOSIS — E78.5 HYPERLIPIDEMIA, UNSPECIFIED HYPERLIPIDEMIA TYPE: ICD-10-CM

## 2018-10-04 RX ORDER — ATORVASTATIN CALCIUM 20 MG/1
TABLET, FILM COATED ORAL
Qty: 90 TABLET | Refills: 0 | Status: SHIPPED | OUTPATIENT
Start: 2018-10-04 | End: 2019-01-04 | Stop reason: SDUPTHER

## 2019-01-04 DIAGNOSIS — E78.5 HYPERLIPIDEMIA, UNSPECIFIED HYPERLIPIDEMIA TYPE: ICD-10-CM

## 2019-01-04 RX ORDER — ATORVASTATIN CALCIUM 20 MG/1
TABLET, FILM COATED ORAL
Qty: 90 TABLET | Refills: 0 | Status: SHIPPED | OUTPATIENT
Start: 2019-01-04 | End: 2019-04-02 | Stop reason: SDUPTHER

## 2019-02-07 DIAGNOSIS — I65.23 BILATERAL CAROTID ARTERY STENOSIS: Primary | ICD-10-CM

## 2019-02-07 NOTE — PROGRESS NOTES
Per Dulce Mederos pt called to schedule doppler apt, per protocol she is overdue  She will also sched rfm apt

## 2019-02-14 ENCOUNTER — HOSPITAL ENCOUNTER (OUTPATIENT)
Dept: NON INVASIVE DIAGNOSTICS | Facility: CLINIC | Age: 79
Discharge: HOME | End: 2019-02-14
Payer: COMMERCIAL

## 2019-02-14 DIAGNOSIS — I65.23 BILATERAL CAROTID ARTERY STENOSIS: ICD-10-CM

## 2019-02-14 PROCEDURE — 93880 EXTRACRANIAL BILAT STUDY: CPT

## 2019-02-14 PROCEDURE — 93880 EXTRACRANIAL BILAT STUDY: CPT | Performed by: SURGERY

## 2019-03-14 ENCOUNTER — OFFICE VISIT (OUTPATIENT)
Dept: VASCULAR SURGERY | Facility: CLINIC | Age: 79
End: 2019-03-14
Payer: COMMERCIAL

## 2019-03-14 ENCOUNTER — TELEPHONE (OUTPATIENT)
Dept: VASCULAR SURGERY | Facility: CLINIC | Age: 79
End: 2019-03-14

## 2019-03-14 VITALS
WEIGHT: 206 LBS | RESPIRATION RATE: 16 BRPM | SYSTOLIC BLOOD PRESSURE: 150 MMHG | HEIGHT: 63 IN | TEMPERATURE: 97.8 F | BODY MASS INDEX: 36.5 KG/M2 | DIASTOLIC BLOOD PRESSURE: 84 MMHG | HEART RATE: 68 BPM

## 2019-03-14 DIAGNOSIS — I65.23 ASYMPTOMATIC BILATERAL CAROTID ARTERY STENOSIS: Primary | ICD-10-CM

## 2019-03-14 DIAGNOSIS — I73.9 INTERMITTENT CLAUDICATION (HCC): ICD-10-CM

## 2019-03-14 DIAGNOSIS — I77.1 INNOMINATE ARTERY STENOSIS (HCC): ICD-10-CM

## 2019-03-14 PROCEDURE — 99214 OFFICE O/P EST MOD 30 MIN: CPT | Performed by: NURSE PRACTITIONER

## 2019-03-14 RX ORDER — CLOPIDOGREL BISULFATE 75 MG/1
1 TABLET ORAL DAILY
COMMUNITY
Start: 2011-12-01 | End: 2021-02-17 | Stop reason: SDUPTHER

## 2019-03-14 RX ORDER — METOPROLOL SUCCINATE 200 MG/1
1 TABLET, EXTENDED RELEASE ORAL DAILY
COMMUNITY
Start: 2011-12-01 | End: 2021-01-27 | Stop reason: SDUPTHER

## 2019-03-14 RX ORDER — ALLOPURINOL 300 MG/1
1 TABLET ORAL DAILY
COMMUNITY
End: 2020-10-12 | Stop reason: SDUPTHER

## 2019-03-14 RX ORDER — NITROGLYCERIN 0.4 MG/1
1 TABLET SUBLINGUAL
COMMUNITY
Start: 2011-11-04

## 2019-03-14 RX ORDER — ATORVASTATIN CALCIUM 20 MG/1
1 TABLET, FILM COATED ORAL DAILY
COMMUNITY
Start: 2015-06-11 | End: 2021-01-28 | Stop reason: SDUPTHER

## 2019-03-14 NOTE — PATIENT INSTRUCTIONS
Carotid Artery Disease   AMBULATORY CARE:   Carotid artery disease  is a condition that causes narrow or blocked carotid arteries  Your carotid arteries are the blood vessels that supply your brain with most of the blood it needs to work  You have 2 carotid arteries, one on each side of your neck  Call 911 for any of the following:   · You have any of the following signs of a stroke:      ¨ Numbness or drooping on one side of your face     ¨ Weakness in an arm or leg    ¨ Confusion or difficulty speaking    ¨ Dizziness, a severe headache, or vision loss    · You have any of the following signs of a heart attack:      ¨ Squeezing, pressure, or pain in your chest that lasts longer than 5 minutes or returns    ¨ Discomfort or pain in your back, neck, jaw, stomach, or arm     ¨ Trouble breathing    ¨ Nausea or vomiting    ¨ Lightheadedness or a sudden cold sweat, especially with chest pain or trouble breathing  Contact your healthcare provider if:   · You have questions or concerns about your condition or care  Signs and symptoms of carotid artery disease: You may have no signs or symptoms  Most commonly, carotid artery disease causes transient ischemic attacks (TIAs), or mini-strokes  You may have numbness, weakness, lack of movement, or vision or speech problems  A TIA goes away quickly and does not cause permanent damage  A TIA may be a warning sign that you are about to have a stroke  If you have any symptoms of a TIA or stroke, seek care immediately  Warning signs of a stroke: The word F A S T  can help you remember and recognize warning signs of a stroke  · F = Face:  One side of the face droops  · A = Arms:  One arm starts to drop when both arms are raised  · S = Speech:  Speech is slurred or sounds different than usual     · T = Time:  A person who is having a stroke needs to be seen immediately  A stroke is a medical emergency that needs immediate treatment   Some medicines and treatments work best if given within a few hours of a stroke  Treatment  for carotid artery disease depends on how narrow your arteries have become, your symptoms, and your general health  The goal of treatment is to lower your risk for a stroke  You may need any of the following:  · Take aspirin if directed  Your healthcare provider may suggest that you take an aspirin a day to prevent blood clots from forming in the carotid arteries  If your healthcare provider wants you to take aspirin daily, do not take acetaminophen or ibuprofen instead  · Control risk factors  High blood pressure, high cholesterol, heart disease, diabetes, and being overweight increase your risk for atherosclerosis  · Procedures can help open blocked arteries  A carotid endarterectomy is used to cut plaque out of the artery  An angioplasty is used to push the plaque against the artery wall with a balloon device  Sometimes a stent is placed during an angioplasty  A stent is a metal mesh tube that is placed in the artery to keep it open  Manage carotid artery disease:   · Eat a variety of healthy foods  Healthy foods include fruit, vegetables, whole-grain breads, low-fat dairy products, lean meat, and fish  Choose fish that are high in omega-3 fatty acids, such as salmon and fresh tuna  Ask your healthcare provider for more information on a heart healthy diet and the DASH eating plan  · Limit sodium (salt)  Sodium may increase your blood pressure  Add less table salt to your foods  Read food labels and choose foods that are low in sodium  Your healthcare provider may suggest you follow a low sodium diet  · Reach or maintain a healthy weight  Extra weight makes your heart work harder  Ask your healthcare provider how much you should weight  He can help you create a safe weight loss plan  Even a weight loss of 10% of your body weight can help your heart function better  · Exercise as directed    Exercise helps improve heart function and can help you manage your weight  Exercise can also help lower your cholesterol and blood sugar levels  Try to get at least 30 minutes of exercise at least 5 times each week  Try to be active every day  Your healthcare provider can help you create an exercise plan that works best for you  · Limit alcohol  Alcohol can increase your blood pressure and triglyceride levels  Men should limit alcohol to 2 drinks per day  Women should limit alcohol to 1 drink per day  A drink of alcohol is 12 ounces of beer, 5 ounces of wine, or 1½ ounces of liquor  · Do not smoke  Nicotine and other chemicals in cigarettes and cigars can cause heart and lung damage  Ask your healthcare provider for information if you currently smoke and need help to quit  E-cigarettes or smokeless tobacco still contain nicotine  Talk to your healthcare provider before you use these products  Follow up with your healthcare provider as directed:  Write down your questions so you remember to ask them during your visits  © 2017 [facility] Information is for End User's use only and may not be sold, redistributed or otherwise used for commercial purposes  All illustrations and images included in CareNotes® are the copyrighted property of [employer]  or [employer]  The above information is an  only  It is not intended as medical advice for individual conditions or treatments  Talk to your doctor, nurse or pharmacist before following any medical regimen to see if it is safe and effective for you

## 2019-03-14 NOTE — PROGRESS NOTES
Assessment/Plan:  24-year-old female with HTN, HLD, DM, mild CKD, CAD, CABG '96, AVR '12, bilateral asymptomatic carotid artery stenosis s/p R CEA and innominate artery stenting 1/27/17 ([facility])who presents to the office for follow-up after routine surveillance duplex  1   Carotid stenosis, Asymptomatic  -CV duplex 2/12/19 showed a widely patent right ICA and a vertebral flow is retrograde and left ICA 70+% stenosis with velocities 380/53, ratio 3 18   -Carotid stenosis is stable in comparison to prior duplex 5/2018   -Will repeat duplex in 6 months  -Continue antiplatelet therapy and statin therapy  -Reviewed the signs symptoms of TIA/CVA in that she go immediately to the ED should she experience these   -Follow up in the office in 1 year  2   Intermittent claudication  -Bilateral lower extremity fatigue with walking a long distance  No limitation lifestyle  -Will measure BEVERLY for baseline at time of next carotid duplex  -Advised walking  Problem List Items Addressed This Visit        Cardiovascular and Mediastinum    Asymptomatic bilateral carotid artery stenosis - Primary    Relevant Orders    VAS carotid complete study       Other    Intermittent claudication (HCC)    Relevant Orders    VAS BEVERLY & waveform analysis, multiple levels            Subjective:      Patient ID: Maria Elena Schofield is a 77 y o  female  Pt is here to review the results of her CV of 2/14/19  Pt denies any CVA/TIA symptoms  Pt is currently taking ASA, Lipitor and Plavix  HPI  24-year-old female with HTN, HLD, DM, mild CKD, CAD, CABG '96, AVR '12, bilateral asymptomatic carotid artery stenosis s/p R CEA and innominate artery stenting 1/27/17 ([facility])who presents to the office for follow-up after routine surveillance duplex  Patient last seen in the office 2 years ago    She was over 2 on carotid duplex was lost to follow-up though recently had carotid duplex 2/12/19 which noted a widely patent right CEA mild InStent stenosis of innominate artery and a left ICA greater than 70% stenosis with velocities 380/53, ratio 3 18  Prior duplex was noted velocities 468/81  She is asymptomatic from a carotid standpoint  She denies any focal neurologica events consistent with TIA/CVA  She denies amaurosis fugax  She has active  She was recently started on metformin for A1c elevation 8 8  She still drives and able to do self-care  She was climbing on a chair last week and fell sustaining mild injuries  She denies vertebral basilar symptoms  She denies upper extremity claudication symptoms  She does admit to intermittent bilateral lower extremity fatigue with walking a distance  No lifestyle limitation  She is maintained on aspirin and Plavix  The following portions of the patient's history were reviewed and updated as appropriate: allergies, current medications, past family history, past medical history, past social history, past surgical history and problem list   Review of systems reviewed    Review of Systems   Constitutional: Negative  HENT: Negative  Eyes: Negative  Respiratory: Negative  Cardiovascular: Negative  Gastrointestinal: Negative  Endocrine: Negative  Genitourinary: Positive for frequency  Musculoskeletal: Negative  Skin: Negative  Allergic/Immunologic: Negative  Neurological: Negative  Hematological: Bruises/bleeds easily  Psychiatric/Behavioral: Negative  Objective:    I have reviewed and made appropriate changes to the review of systems input by the medical assistant      Vitals:    03/14/19 0913 03/14/19 0918   BP: 148/80 150/84   BP Location: Left arm Right arm   Patient Position: Sitting Sitting   Cuff Size: Adult Adult   Pulse: 68    Resp: 16    Temp: 97 8 °F (36 6 °C)    TempSrc: Tympanic    Weight: 93 4 kg (206 lb)    Height: 5' 3" (1 6 m)        Patient Active Problem List   Diagnosis    Cataract, left eye    Cataract of right eye    Asymptomatic bilateral carotid artery stenosis    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)    Demand ischemia of myocardium ([facility])    Hyperlipidemia    Hypertension    PAD (peripheral artery disease) ([facility])    CKD (chronic kidney disease) stage 3, GFR 30-59 ml/min ([facility])    Coronary artery disease    Non-ST elevation myocardial infarction (NSTEMI), type 2    New onset a-fib ([address] )    Intermittent claudication ([address] )       Past Surgical History:   Procedure Laterality Date    ABDOMINAL AORTIC ANEURYSM REPAIR      AORTIC VALVE REPLACEMENT      CATARACT EXTRACTION      CORONARY ARTERY BYPASS GRAFT      CORONARY STENT PLACEMENT      CYSTOSCOPY      HYSTERECTOMY       [facility] CATARACT EXTRACAP,INSERT LENS Right 10/25/2016    Procedure: OD EXTRACTION EXTRACAPSULAR CATARACT PHACO INTRAOCULAR LENS (IOL); Surgeon: Latha Stafford MD;  [facility]: [facility];  Service: Ophthalmology    [address] CATARACT EXTRACAP,INSERT LENS Left 10/18/2016    Procedure: OS EXTRACTION EXTRACAPSULAR CATARACT PHACO INTRAOCULAR LENS (IOL); Surgeon: Latha Stafford MD;  [facility];  Service: Ophthalmology    NM Maria Isabel Cross Right 1/27/2017    Procedure: CAROTID ENDARTERECTOMY WITH RETROGRADE AND INNOMINATE ARTERY STENTING;  Surgeon: Radha Mccarthy MD;  [facility];  Service: Vascular    VASCULAR SURGERY         History reviewed  No pertinent family history  Social History     Socioeconomic History    Marital status:       Spouse name: Not on file    Number of children: Not on file    Years of education: Not on file    Highest education level: Not on file   Occupational History    Not on file   Social Needs    Financial resource strain: Not on file    Food insecurity:     Worry: Not on file     Inability: Not on file    Transportation needs:     Medical: Not on file     Non-medical: Not on file   Tobacco Use    Smoking status: Former Smoker    Smokeless tobacco: Never Used    Tobacco comment: quit 20 years ago   Substance and Sexual Activity    Alcohol use: No    Drug use: No    Sexual activity: Not on file   Lifestyle    Physical activity:     Days per week: Not on file     Minutes per session: Not on file    Stress: Not on file   Relationships    Social connections:     Talks on phone: Not on file     Gets together: Not on file     Attends L[employer] day service: Not on file     Active member of club or organization: Not on file     Attends meetings of clubs or organizations: Not on file     Relationship status: Not on file    Intimate partner violence:     Fear of current or ex partner: Not on file     Emotionally abused: Not on file     Physically abused: Not on file     Forced sexual activity: Not on file   Other Topics Concern    Not on file   Social History Narrative    Not on file       Allergies   Allergen Reactions    Medical Tape     Other Rash     Pt states she is allergic to tape adhesive         Current Outpatient Medications:     acetaminophen (TYLENOL) 325 mg tablet, Take 650 mg by mouth every 6 (six) hours as needed for mild pain, Disp: , Rfl:     allopurinol (ZYLOPRIM) 300 mg tablet, Take 300 mg by mouth daily, Disp: , Rfl:     aspirin 81 mg chewable tablet, Chew 1 tablet daily for 30 days, Disp: 30 tablet, Rfl: 0    atorvastatin (LIPITOR) 20 mg tablet, TAKE 1 TABLET BY MOUTH EVERY DAY, Disp: 90 tablet, Rfl: 0    clopidogrel (PLAVIX) 75 mg tablet, Take 75 mg by mouth daily, Disp: , Rfl:     metoprolol succinate (TOPROL-XL) 200 MG 24 hr tablet, Take 200 mg by mouth every morning, Disp: , Rfl:     nitroglycerin (NITROSTAT) 0 4 mg SL tablet, Place 0 4 mg under the tongue every 5 (five) minutes as needed for chest pain, Disp: , Rfl:     allopurinol (ZYLOPRIM) 300 mg tablet, Take 1 tablet by mouth daily, Disp: , Rfl:     atorvastatin (LIPITOR) 20 mg tablet, Take 1 tablet by mouth daily, Disp: , Rfl:     clopidogrel (PLAVIX) 75 mg tablet, Take 1 tablet by mouth daily, Disp: , Rfl:    metFORMIN (GLUCOPHAGE) 500 mg tablet, TK 1 T PO  D, Disp: , Rfl: 2    metoprolol succinate (TOPROL-XL) 200 MG 24 hr tablet, Take 1 tablet by mouth daily, Disp: , Rfl:     nitroglycerin (NITROSTAT) 0 4 mg SL tablet, Place 1 tablet under the tongue every 5 (five) minutes as needed, Disp: , Rfl:     /84 (BP Location: Right arm, Patient Position: Sitting, Cuff Size: Adult)   Pulse 68   Temp 97 8 °F (36 6 °C) (Tympanic)   Resp 16   Ht 5' 3" (1 6 m)   Wt 93 4 kg (206 lb)   BMI 36 49 kg/m²          Physical Exam   Constitutional: She is oriented to person, place, and time  She appears well-developed and well-nourished  HENT:   Head: Normocephalic and atraumatic  Eyes: EOM are normal    Neck: Neck supple  Cardiovascular:   Murmur heard  Pulses:       Carotid pulses are on the right side with bruit, and on the left side with bruit  Radial pulses are 0 on the right side, and 1+ on the left side  Posterior tibial pulses are 0 on the right side, and 0 on the left side  Pulmonary/Chest: Effort normal and breath sounds normal    Abdominal: Soft  Bowel sounds are normal    Musculoskeletal: Normal range of motion  Trace ankle swelling    Neurological: She is alert and oriented to person, place, and time  Skin: Skin is warm  Psychiatric: Her behavior is normal  Thought content normal    Nursing note and vitals reviewed

## 2019-03-14 NOTE — TELEPHONE ENCOUNTER
Per ROSA JAMA email "and patient needs a CTA of the neck with BMP prior  She was told carotid duplex in 6 months by me so this is a change  Just please let her know I discussed the case with Dr Ian Benitez and think she should have CTA to check stent"  Pt had bmp 2/7, results reviewed w/ Cameron Cure - she states pt does not need to have it repeated and needs oral hydration pre cta  S/w Pt and informed of above, she agrees to sched cta, instructed to drink 8 glasses of h2o day prior to cta, additional instructions will be mailed to her for cta and directions for eating and drinking day of cta  Will ask gibran to have a  arrange for pt tomorrow as they are gone for the day

## 2019-03-15 NOTE — TELEPHONE ENCOUNTER
Patient is scheduled for CTA at [facility] on 3/19/19  Gave her instructions and also mailed them to her

## 2019-03-18 ENCOUNTER — TELEPHONE (OUTPATIENT)
Dept: VASCULAR SURGERY | Facility: CLINIC | Age: 79
End: 2019-03-18

## 2019-03-18 NOTE — TELEPHONE ENCOUNTER
Patient called with some questions regarding CTA appointment for  Tomorrow and I have given this information to scheduling  SJ

## 2019-03-19 ENCOUNTER — HOSPITAL ENCOUNTER (OUTPATIENT)
Dept: RADIOLOGY | Age: 79
Discharge: HOME | End: 2019-03-19
Payer: COMMERCIAL

## 2019-03-19 DIAGNOSIS — I77.1 INNOMINATE ARTERY STENOSIS (HCC): ICD-10-CM

## 2019-03-19 DIAGNOSIS — I65.23 ASYMPTOMATIC BILATERAL CAROTID ARTERY STENOSIS: ICD-10-CM

## 2019-03-19 PROCEDURE — 70498 CT ANGIOGRAPHY NECK: CPT

## 2019-03-19 RX ADMIN — IODIXANOL 85 ML: 320 INJECTION, SOLUTION INTRAVASCULAR at 12:56

## 2019-04-02 DIAGNOSIS — E78.5 HYPERLIPIDEMIA, UNSPECIFIED HYPERLIPIDEMIA TYPE: ICD-10-CM

## 2019-04-03 ENCOUNTER — OFFICE VISIT (OUTPATIENT)
Dept: VASCULAR SURGERY | Facility: CLINIC | Age: 79
End: 2019-04-03
Payer: COMMERCIAL

## 2019-04-03 VITALS
DIASTOLIC BLOOD PRESSURE: 70 MMHG | TEMPERATURE: 98.7 F | HEART RATE: 64 BPM | HEIGHT: 63 IN | WEIGHT: 203 LBS | RESPIRATION RATE: 16 BRPM | SYSTOLIC BLOOD PRESSURE: 132 MMHG | BODY MASS INDEX: 35.97 KG/M2

## 2019-04-03 DIAGNOSIS — I65.23 ASYMPTOMATIC BILATERAL CAROTID ARTERY STENOSIS: ICD-10-CM

## 2019-04-03 DIAGNOSIS — I77.1 INNOMINATE ARTERY STENOSIS (HCC): Primary | ICD-10-CM

## 2019-04-03 PROCEDURE — 99214 OFFICE O/P EST MOD 30 MIN: CPT | Performed by: SURGERY

## 2019-04-05 RX ORDER — ATORVASTATIN CALCIUM 20 MG/1
TABLET, FILM COATED ORAL
Qty: 90 TABLET | Refills: 0 | Status: SHIPPED | OUTPATIENT
Start: 2019-04-05 | End: 2021-04-13 | Stop reason: SDUPTHER

## 2019-10-03 ENCOUNTER — HOSPITAL ENCOUNTER (OUTPATIENT)
Dept: NON INVASIVE DIAGNOSTICS | Facility: CLINIC | Age: 79
Discharge: HOME | End: 2019-10-03
Payer: COMMERCIAL

## 2019-10-03 DIAGNOSIS — I73.9 INTERMITTENT CLAUDICATION (HCC): ICD-10-CM

## 2019-10-03 DIAGNOSIS — I65.23 ASYMPTOMATIC BILATERAL CAROTID ARTERY STENOSIS: ICD-10-CM

## 2019-10-03 PROCEDURE — 93880 EXTRACRANIAL BILAT STUDY: CPT | Performed by: SURGERY

## 2019-10-03 PROCEDURE — 93923 UPR/LXTR ART STDY 3+ LVLS: CPT | Performed by: SURGERY

## 2019-10-03 PROCEDURE — 93880 EXTRACRANIAL BILAT STUDY: CPT

## 2019-10-03 PROCEDURE — 93923 UPR/LXTR ART STDY 3+ LVLS: CPT

## 2020-06-24 DIAGNOSIS — I65.23 CAROTID STENOSIS, BILATERAL: Primary | ICD-10-CM

## 2020-08-17 ENCOUNTER — HOSPITAL ENCOUNTER (OUTPATIENT)
Dept: NON INVASIVE DIAGNOSTICS | Facility: CLINIC | Age: 80
Discharge: HOME | End: 2020-08-17
Payer: COMMERCIAL

## 2020-08-17 DIAGNOSIS — I65.23 CAROTID STENOSIS, BILATERAL: ICD-10-CM

## 2020-08-17 PROCEDURE — 93880 EXTRACRANIAL BILAT STUDY: CPT

## 2020-08-17 PROCEDURE — 93880 EXTRACRANIAL BILAT STUDY: CPT | Performed by: SURGERY

## 2020-08-24 ENCOUNTER — TELEPHONE (OUTPATIENT)
Dept: VASCULAR SURGERY | Facility: CLINIC | Age: 80
End: 2020-08-24

## 2020-08-25 ENCOUNTER — OFFICE VISIT (OUTPATIENT)
Dept: VASCULAR SURGERY | Facility: CLINIC | Age: 80
End: 2020-08-25
Payer: COMMERCIAL

## 2020-08-25 VITALS
SYSTOLIC BLOOD PRESSURE: 144 MMHG | WEIGHT: 199 LBS | BODY MASS INDEX: 35.26 KG/M2 | TEMPERATURE: 95.6 F | DIASTOLIC BLOOD PRESSURE: 66 MMHG | HEIGHT: 63 IN | HEART RATE: 82 BPM

## 2020-08-25 DIAGNOSIS — N18.30 CKD (CHRONIC KIDNEY DISEASE) STAGE 3, GFR 30-59 ML/MIN (HCC): ICD-10-CM

## 2020-08-25 DIAGNOSIS — E78.5 HYPERLIPIDEMIA, UNSPECIFIED HYPERLIPIDEMIA TYPE: ICD-10-CM

## 2020-08-25 DIAGNOSIS — I65.23 ASYMPTOMATIC BILATERAL CAROTID ARTERY STENOSIS: ICD-10-CM

## 2020-08-25 DIAGNOSIS — I10 ESSENTIAL HYPERTENSION: ICD-10-CM

## 2020-08-25 DIAGNOSIS — I77.1 INNOMINATE ARTERY STENOSIS (HCC): Primary | ICD-10-CM

## 2020-08-25 PROCEDURE — 1036F TOBACCO NON-USER: CPT | Performed by: PHYSICIAN ASSISTANT

## 2020-08-25 PROCEDURE — 3078F DIAST BP <80 MM HG: CPT | Performed by: PHYSICIAN ASSISTANT

## 2020-08-25 PROCEDURE — 1160F RVW MEDS BY RX/DR IN RCRD: CPT | Performed by: PHYSICIAN ASSISTANT

## 2020-08-25 PROCEDURE — 3077F SYST BP >= 140 MM HG: CPT | Performed by: PHYSICIAN ASSISTANT

## 2020-08-25 PROCEDURE — 99214 OFFICE O/P EST MOD 30 MIN: CPT | Performed by: PHYSICIAN ASSISTANT

## 2020-08-25 NOTE — ASSESSMENT & PLAN NOTE
79-year-old female former smoker with HTN, HLD, type 2 DM, CKD 3 (baseline creat 1 1-1 3), CAD, s/p CABG w/R GSV harvest '96, PCI/stent '12, bioprosthetic AVR '12, atrial fibrillation, innominate artery stenosis and asymptomatic bilateral carotid artery stenosis, s/p R CEA and R innominate stent by Dr Abbey Joel 1/27/17 with known asymptomatic innominate stent restenosis  Patient continues to deny focal neurologic or right upper extremity symptoms  -stable 78 mm gradient noted on duplex but only 10 mm gradient noted on exam today   -innominate stent stenosis identified on previous CTA and noninvasive imaging remains stable  Given that patient is asymptomatic will continue conservative management as previously recommended  Will obtain repeat carotid duplex in 6 months with follow-up in the office   -continue ASA, plavix and statin therapy  -return to office with Dr Abbey Joel in 6 months with carotid duplex for re-evaluation, particularly given significant L carotid stenosis  -instructed to contact the office with new symptoms or concerns

## 2020-08-25 NOTE — ASSESSMENT & PLAN NOTE
-stable  -baseline creatinine 1 1-1 3  -most recent creatinine 1 36/GFR 39  -continue to avoid nephrotoxic agents

## 2020-08-25 NOTE — ASSESSMENT & PLAN NOTE
-s/p R CEA by Dr Clarisa Lombardi 1/27/17  -known 70% L ICA stenosis on CTA 3/19/19 with high velocities on duplex  -recent carotid duplex w/patent R endarterectomy site, >70% L ICA stenosis w/velocity 441/86 and suggestion of innominate stenosis  Known retrograde R vertebral flow   -asymptomatic  -continue ASA and statin therapy  -L ICA velocities elevated but essentially stable with diastolic velocity of 80   Given underlying CKD and prior CTA last year, will continue short interval surveillance with office follow-up with Dr Clarisa Lombardi in 6 months   -educated in signs/symptoms of TIA/CVA and instructed to call 911 immediately

## 2020-08-25 NOTE — PROGRESS NOTES
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 stenosis, s/p R CEA and R innominate stent by Dr Saeed Hampton 1/27/17 with known asymptomatic innominate stent restenosis  Patient continues to deny focal neurologic or right upper extremity symptoms  -stable 78 mm gradient noted on duplex but only 10 mm gradient noted on exam today   -innominate stent stenosis identified on previous CTA and noninvasive imaging remains stable  Given that patient is asymptomatic will continue conservative management as previously recommended  Will obtain repeat carotid duplex in 6 months with follow-up in the office   -continue ASA, plavix and statin therapy  -return to office with Dr Saeed Hampton in 6 months with carotid duplex for re-evaluation, particularly given significant L carotid stenosis  -instructed to contact the office with new symptoms or concerns  Asymptomatic bilateral carotid artery stenosis  -s/p R CEA by Dr Saeed Hampton 1/27/17  -known 70% L ICA stenosis on CTA 3/19/19 with high velocities on duplex  -recent carotid duplex w/patent R endarterectomy site, >70% L ICA stenosis w/velocity 441/86 and suggestion of innominate stenosis  Known retrograde R vertebral flow   -asymptomatic  -continue ASA and statin therapy  -L ICA velocities elevated but essentially stable with diastolic velocity of 80   Given underlying CKD and prior CTA last year, will continue short interval surveillance with office follow-up with Dr Saeed Hampton in 6 months   -educated in signs/symptoms of TIA/CVA and instructed to call 911 immediately    Hyperlipidemia  -stable  -continue statin therapy  -management per PCP    Hypertension  -BP well controlled  -continue current medical regimen  -management per PCP    CKD (chronic kidney disease) stage 3, GFR 30-59 ml/min  -stable  -baseline creatinine 1 1-1 3  -most recent creatinine 1 36/GFR 39  -continue to avoid nephrotoxic agents         Diagnoses and all orders for this visit:    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HCC)  -     VAS carotid complete study; Future    Asymptomatic bilateral carotid artery stenosis  -     VAS carotid complete study; Future    Essential hypertension    Hyperlipidemia, unspecified hyperlipidemia type    CKD (chronic kidney disease) stage 3, GFR 30-59 ml/min (HCC)          Subjective:      Patient ID: Danitza Crook is a [de-identified] y o  female  Patient is [facility], patient had CV done on 8/17  Patient denies any TIA or stroke symptoms  Patient currently takes ASA 81mg, Atorvastatin, Plavix  60-year-old female former smoker with HTN, HLD, type 2 DM, CKD 3 (baseline creat 1 1-1 3), CAD, s/p CABG '96, PCI/stent '12, bioprosthetic AVR '12, atrial fibrillation, innominate artery stenosis and asymptomatic bilateral carotid artery stenosis, s/p R CEA and R innominate stent by Dr Joe Blackmon 1/27/17 with known asymptomatic innominate stent restenosis who returns the office to review surveillance noninvasive imaging and risk factor modification  Carotid duplex 8/17/2020 reviewed and demonstrates patent right endarterectomy site with suggestion of right innominate stent restenoses given attenuated R CCA waveforms, right vertebral flow retrograde  Carotid duplex also continues to demonstrate >70% L ICA stenosis with velocity 441/86, severe L ECA stenosis and possible L vertebral stenosis  78 mm gradient noted at time of duplex but not reproduced in office  CTA 3/19/2019 had demonstrated innominate re-stenosis, 60-70% L ICA stenosis (correlating with L ICA velocity 380/53, ratio 3 18 on duplex) and no evidence of vertebral stenosis    Patient remains asymptomatic and denies aphasia, dysarthria, ataxia, lateralizing extremity weakness, facial droop, amaurosis fugax, right upper extremity numbness, paresthesias, weakness, claudication, tissue loss or skin temperature/color changes  Patient denies prior history of TIA/CVA  The following portions of the patient's history were reviewed and updated as appropriate: allergies, current medications, past family history, past medical history, past social history, past surgical history and problem list     Review of Systems   Constitutional: Negative  Negative for chills and fever  HENT: Negative  Eyes: Negative  Respiratory: Negative  Negative for chest tightness and shortness of breath  Cardiovascular: Negative  Negative for chest pain  Gastrointestinal: Negative  Endocrine: Negative  Genitourinary: Negative  Musculoskeletal: Negative  Skin: Negative  Allergic/Immunologic: Negative  Neurological: Negative  Negative for dizziness, facial asymmetry, speech difficulty, weakness, light-headedness, numbness and headaches  Hematological: Negative  Psychiatric/Behavioral: Negative  I have reviewed and made appropriate changes to the review of systems input by the medical assistant      Vitals:    08/25/20 0929 08/25/20 0932   BP: 138/70 144/66   BP Location: Right arm Left arm   Patient Position: Sitting Sitting   Cuff Size: Standard Standard   Pulse: 78 82   Temp: (!) 95 6 °F (35 3 °C)    TempSrc: Tympanic    Weight: 90 3 kg (199 lb)    Height: 5' 3" (1 6 m)        Patient Active Problem List   Diagnosis    Cataract, left eye    Cataract of right eye    Asymptomatic bilateral carotid artery stenosis    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HCC)    Demand ischemia of myocardium (HCC)    Hyperlipidemia    Hypertension    CKD (chronic kidney disease) stage 3, GFR 30-59 ml/min (HCC)    Coronary artery disease    Non-ST elevation myocardial infarction (NSTEMI), type 2    New onset a-fib (HCC)    Intermittent claudication (HCC)       Past Surgical History:   Procedure Laterality Date    ABDOMINAL AORTIC ANEURYSM REPAIR      AORTIC VALVE REPLACEMENT      CATARACT EXTRACTION      CORONARY ARTERY BYPASS GRAFT      CORONARY STENT PLACEMENT      CYSTOSCOPY      HYSTERECTOMY      DE Ira Blue Right 1/27/2017    Procedure: CAROTID ENDARTERECTOMY WITH RETROGRADE AND INNOMINATE ARTERY STENTING;  Surgeon: Bard Luz MD;  [facility]: [facility];  Service: Vascular    DE XCAPSL CTRC RMVL INSJ IO LENS PROSTH W/O ECP Right 10/25/2016    Procedure: OD EXTRACTION EXTRACAPSULAR CATARACT PHACO INTRAOCULAR LENS (IOL); Surgeon: Corina Montero MD;  [facility];  Service: Ophthalmology    DE XCAPSL CTRC RMVL INSJ IO LENS PROSTH W/O ECP Left 10/18/2016    Procedure: OS EXTRACTION EXTRACAPSULAR CATARACT PHACO INTRAOCULAR LENS (IOL); Surgeon: Corina Montero MD;  [facility]: [facility];  Service: Ophthalmology    VASCULAR SURGERY         History reviewed  No pertinent family history  Social History     Socioeconomic History    Marital status:       Spouse name: Not on file    Number of children: Not on file    Years of education: Not on file    Highest education level: Not on file   Occupational History    Not on file   Social Needs    Financial resource strain: Not on file    Food insecurity     Worry: Not on file     Inability: Not on file    Transportation needs     Medical: Not on file     Non-medical: Not on file   Tobacco Use    Smoking status: Former Smoker    Smokeless tobacco: Never Used    Tobacco comment: quit 20 years ago   Substance and Sexual Activity    Alcohol use: No    Drug use: No    Sexual activity: Not on file   Lifestyle    Physical activity     Days per week: Not on file     Minutes per session: Not on file    Stress: Not on file   Relationships    Social connections     Talks on phone: Not on file     Gets together: Not on file     Attends [employer] service: Not on file     Active member of club or organization: Not on file Attends meetings of clubs or organizations: Not on file     Relationship status: Not on file    Intimate partner violence     Fear of current or ex partner: Not on file     Emotionally abused: Not on file     Physically abused: Not on file     Forced sexual activity: Not on file   Other Topics Concern    Not on file   Social History Narrative    Not on file       Allergies   Allergen Reactions    Medical Tape     Other Rash     Pt states she is allergic to tape adhesive         Current Outpatient Medications:     acetaminophen (TYLENOL) 325 mg tablet, Take 650 mg by mouth every 6 (six) hours as needed for mild pain, Disp: , Rfl:     allopurinol (ZYLOPRIM) 300 mg tablet, Take 300 mg by mouth daily, Disp: , Rfl:     allopurinol (ZYLOPRIM) 300 mg tablet, Take 1 tablet by mouth daily, Disp: , Rfl:     aspirin 81 mg chewable tablet, Chew 1 tablet daily for 30 days, Disp: 30 tablet, Rfl: 0    atorvastatin (LIPITOR) 20 mg tablet, Take 1 tablet by mouth daily, Disp: , Rfl:     atorvastatin (LIPITOR) 20 mg tablet, TAKE 1 TABLET BY MOUTH EVERY DAY, Disp: 90 tablet, Rfl: 0    clopidogrel (PLAVIX) 75 mg tablet, Take 75 mg by mouth daily, Disp: , Rfl:     clopidogrel (PLAVIX) 75 mg tablet, Take 1 tablet by mouth daily, Disp: , Rfl:     metFORMIN (GLUCOPHAGE) 500 mg tablet, TK 1 T PO  D, Disp: , Rfl: 2    metoprolol succinate (TOPROL-XL) 200 MG 24 hr tablet, Take 200 mg by mouth every morning, Disp: , Rfl:     metoprolol succinate (TOPROL-XL) 200 MG 24 hr tablet, Take 1 tablet by mouth daily, Disp: , Rfl:     nitroglycerin (NITROSTAT) 0 4 mg SL tablet, Place 0 4 mg under the tongue every 5 (five) minutes as needed for chest pain, Disp: , Rfl:     nitroglycerin (NITROSTAT) 0 4 mg SL tablet, Place 1 tablet under the tongue every 5 (five) minutes as needed, Disp: , Rfl:      Objective:  Imaging study:  Carotid duplex 8/17/2020:  Imaging study reviewed and as described above    See full report below:  Right Impression         PSV  EDV (cm/s)  Direction of Flow    Dist  ICA                        45          14                       Mid  ICA                         63          22                       Prox  ICA    Widely Patent       68          25                       Dist CCA                         41          14                       Mid CCA                          42          17                       Prox CCA                         39          15                       Ext Carotid                      57          10                       Prox Vert                        87           8  Retrograde           Subclavian                      141          15                       Innominate   Possible occusion  121          17                          Left         Impression       PSV  EDV (cm/s)  Direction of Flow    Dist  ICA    50 - 69%         139          26                       Mid  ICA     70%+             293          39                       Prox  ICA    70%+             441          86                       Dist CCA                      125          25                       Mid CCA                       148          21                       Prox CCA                      130          30                       Ext Carotid  Severe stenosis  504          49                       Prox Vert    Stenosis         300          47  Antegrade            Subclavian                    325          44                                CONCLUSION:     Impression  RIGHT:  Widely patent internal carotid artery and endarterectomy site  The common  carotid artery Doppler waveform is attenuated which suggests a significant more  proximal/innominate artery occlusion vs stenosis  Vertebral artery flow is Retrograde  There is no significant subclavian artery  disease  LEFT:  There is >70% stenosis noted in the internal carotid artery  Plaque is  heterogenous and irregular    Severe external carotid artery disease is noted   Vertebral artery flow is antegrade  There is no significant subclavian artery  disease  Tech note: There is a brachial pressure gradient of 78 mmHg left > right  In comparison to the study of 10/03/2019, there is no significant interval  change in the disease process  /66 (BP Location: Left arm, Patient Position: Sitting, Cuff Size: Standard)   Pulse 82   Temp (!) 95 6 °F (35 3 °C) (Tympanic)   Ht 5' 3" (1 6 m)   Wt 90 3 kg (199 lb)   BMI 35 25 kg/m²          Physical Exam  Vitals signs and nursing note reviewed  Constitutional:       General: She is not in acute distress  Appearance: She is well-developed  HENT:      Head: Normocephalic and atraumatic  Eyes:      General: No scleral icterus  Conjunctiva/sclera: Conjunctivae normal       Pupils: Pupils are equal, round, and reactive to light  Neck:      Musculoskeletal: Normal range of motion and neck supple  Thyroid: No thyromegaly  Vascular: Carotid bruit (Left carotid bruit, right subclavian/innominate bruits) present  No JVD  Trachea: No tracheal deviation  Cardiovascular:      Rate and Rhythm: Normal rate and regular rhythm  Pulses:           Carotid pulses are 2+ on the right side and 2+ on the left side with bruit  Radial pulses are 0 on the right side and 2+ on the left side  Heart sounds: S1 normal and S2 normal  No murmur  No friction rub  No gallop  No S3 sounds  Comments: Bilateral lower extremities warm, pink, motor and sensory intact and well perfused without cyanosis, pallor, rubor or tissue loss  + right infraclavicular bruit  Right upper extremity warm, pink, motor and sensory intact and well perfused without tissue loss, cyanosis or pallor  Right radial pulse nonpalpable, 2+ ulnar and 1+ brachial pulse  Biphasic right brachial, palmar arch and radial Doppler signals  Pulmonary:      Effort: No respiratory distress  Breath sounds: Normal breath sounds   No stridor  No wheezing, rhonchi or rales  Abdominal:      General: Bowel sounds are normal  There is no distension or abdominal bruit  Palpations: Abdomen is soft  There is no mass or pulsatile mass  Tenderness: There is no abdominal tenderness  There is no rebound  Musculoskeletal: Normal range of motion  General: No deformity  Right lower leg: No edema  Left lower leg: No edema  Skin:     General: Skin is warm and dry  Coloration: Skin is not pale  Findings: No erythema or lesion  Neurological:      General: No focal deficit present  Mental Status: She is alert and oriented to person, place, and time  Psychiatric:         Mood and Affect: Mood normal          Thought Content:  Thought content normal

## 2020-08-25 NOTE — PATIENT INSTRUCTIONS
Carotid Artery Disease   AMBULATORY CARE:   Carotid artery disease  is a condition that causes narrow or blocked carotid arteries  Your carotid arteries are the blood vessels that supply your brain with most of the blood it needs to work  You have 2 carotid arteries, one on each side of your neck  Call 911 for any of the following:   · You have any of the following signs of a stroke:      ¨ Numbness or drooping on one side of your face     ¨ Weakness in an arm or leg    ¨ Confusion or difficulty speaking    ¨ Dizziness, a severe headache, or vision loss    · You have any of the following signs of a heart attack:      ¨ Squeezing, pressure, or pain in your chest that lasts longer than 5 minutes or returns    ¨ Discomfort or pain in your back, neck, jaw, stomach, or arm     ¨ Trouble breathing    ¨ Nausea or vomiting    ¨ Lightheadedness or a sudden cold sweat, especially with chest pain or trouble breathing  Contact your healthcare provider if:   · You have questions or concerns about your condition or care  Signs and symptoms of carotid artery disease: You may have no signs or symptoms  Most commonly, carotid artery disease causes transient ischemic attacks (TIAs), or mini-strokes  You may have numbness, weakness, lack of movement, or vision or speech problems  A TIA goes away quickly and does not cause permanent damage  A TIA may be a warning sign that you are about to have a stroke  If you have any symptoms of a TIA or stroke, seek care immediately  Warning signs of a stroke: The word F A S T  can help you remember and recognize warning signs of a stroke  · F = Face:  One side of the face droops  · A = Arms:  One arm starts to drop when both arms are raised  · S = Speech:  Speech is slurred or sounds different than usual     · T = Time:  A person who is having a stroke needs to be seen immediately  A stroke is a medical emergency that needs immediate treatment   Some medicines and treatments work best if given within a few hours of a stroke  Treatment  for carotid artery disease depends on how narrow your arteries have become, your symptoms, and your general health  The goal of treatment is to lower your risk for a stroke  You may need any of the following:  · Take aspirin if directed  Your healthcare provider may suggest that you take an aspirin a day to prevent blood clots from forming in the carotid arteries  If your healthcare provider wants you to take aspirin daily, do not take acetaminophen or ibuprofen instead  · Control risk factors  High blood pressure, high cholesterol, heart disease, diabetes, and being overweight increase your risk for atherosclerosis  · Procedures can help open blocked arteries  A carotid endarterectomy is used to cut plaque out of the artery  An angioplasty is used to push the plaque against the artery wall with a balloon device  Sometimes a stent is placed during an angioplasty  A stent is a metal mesh tube that is placed in the artery to keep it open  Manage carotid artery disease:   · Eat a variety of healthy foods  Healthy foods include fruit, vegetables, whole-grain breads, low-fat dairy products, lean meat, and fish  Choose fish that are high in omega-3 fatty acids, such as salmon and fresh tuna  Ask your healthcare provider for more information on a heart healthy diet and the DASH eating plan  · Limit sodium (salt)  Sodium may increase your blood pressure  Add less table salt to your foods  Read food labels and choose foods that are low in sodium  Your healthcare provider may suggest you follow a low sodium diet  · Reach or maintain a healthy weight  Extra weight makes your heart work harder  Ask your healthcare provider how much you should weight  He can help you create a safe weight loss plan  Even a weight loss of 10% of your body weight can help your heart function better  · Exercise as directed    Exercise helps improve heart function and can help you manage your weight  Exercise can also help lower your cholesterol and blood sugar levels  Try to get at least 30 minutes of exercise at least 5 times each week  Try to be active every day  Your healthcare provider can help you create an exercise plan that works best for you  · Limit alcohol  Alcohol can increase your blood pressure and triglyceride levels  Men should limit alcohol to 2 drinks per day  Women should limit alcohol to 1 drink per day  A drink of alcohol is 12 ounces of beer, 5 ounces of wine, or 1½ ounces of liquor  · Do not smoke  Nicotine and other chemicals in cigarettes and cigars can cause heart and lung damage  Ask your healthcare provider for information if you currently smoke and need help to quit  E-cigarettes or smokeless tobacco still contain nicotine  Talk to your healthcare provider before you use these products  Follow up with your healthcare provider as directed:  Write down your questions so you remember to ask them during your visits  © 2017 [employer]  Information is for End User's use only and may not be sold, redistributed or otherwise used for commercial purposes  All illustrations and images included in CareNotes® are the copyrighted property of [employer]  or [employer]  The above information is an  only  It is not intended as medical advice for individual conditions or treatments  Talk to your doctor, nurse or pharmacist before following any medical regimen to see if it is safe and effective for you       -we will continue surveillance with carotid ultrasound in 6 months  Your recent carotid ultrasound demonstrates stability of your known right innominate stent restenoses and left carotid narrowing    We will follow up in a short interval with repeat duplex to determine need for CT scan to re-evaluate your left carotid disease   -return to office with Dr Delon Fox in 6 months with carotid ultrasound   -continue aspirin and statin therapy  -please contact the office in the interim with any questions, concerns or new symptoms  -call 911 immediately for signs or symptoms of TIA/CVA

## 2020-09-30 ENCOUNTER — OFFICE VISIT (OUTPATIENT)
Dept: INTERNAL MEDICINE CLINIC | Facility: CLINIC | Age: 80
End: 2020-09-30
Payer: COMMERCIAL

## 2020-09-30 VITALS
SYSTOLIC BLOOD PRESSURE: 138 MMHG | HEIGHT: 63 IN | HEART RATE: 80 BPM | DIASTOLIC BLOOD PRESSURE: 80 MMHG | WEIGHT: 200 LBS | BODY MASS INDEX: 35.44 KG/M2 | TEMPERATURE: 97.4 F

## 2020-09-30 DIAGNOSIS — E78.5 HYPERLIPIDEMIA, UNSPECIFIED HYPERLIPIDEMIA TYPE: ICD-10-CM

## 2020-09-30 DIAGNOSIS — Z23 NEED FOR INFLUENZA VACCINATION: ICD-10-CM

## 2020-09-30 DIAGNOSIS — I48.91 NEW ONSET A-FIB (HCC): ICD-10-CM

## 2020-09-30 DIAGNOSIS — N18.30 CKD (CHRONIC KIDNEY DISEASE) STAGE 3, GFR 30-59 ML/MIN (HCC): ICD-10-CM

## 2020-09-30 DIAGNOSIS — I10 ESSENTIAL HYPERTENSION: Primary | ICD-10-CM

## 2020-09-30 PROCEDURE — 3075F SYST BP GE 130 - 139MM HG: CPT | Performed by: INTERNAL MEDICINE

## 2020-09-30 PROCEDURE — 1036F TOBACCO NON-USER: CPT | Performed by: INTERNAL MEDICINE

## 2020-09-30 PROCEDURE — 3079F DIAST BP 80-89 MM HG: CPT | Performed by: INTERNAL MEDICINE

## 2020-09-30 PROCEDURE — 90653 IIV ADJUVANT VACCINE IM: CPT

## 2020-09-30 PROCEDURE — G0008 ADMIN INFLUENZA VIRUS VAC: HCPCS

## 2020-09-30 PROCEDURE — 3725F SCREEN DEPRESSION PERFORMED: CPT | Performed by: INTERNAL MEDICINE

## 2020-09-30 PROCEDURE — 99214 OFFICE O/P EST MOD 30 MIN: CPT | Performed by: INTERNAL MEDICINE

## 2020-09-30 PROCEDURE — 1160F RVW MEDS BY RX/DR IN RCRD: CPT | Performed by: INTERNAL MEDICINE

## 2020-09-30 RX ORDER — AMLODIPINE BESYLATE 2.5 MG/1
2.5 TABLET ORAL DAILY
COMMUNITY
End: 2020-10-26

## 2020-09-30 NOTE — PROGRESS NOTES
Assessment/Plan:    No problem-specific Assessment & Plan notes found for this encounter  Diagnoses and all orders for this visit:    Essential hypertension  -     CMP14+LP+5AC+CBC/D/PLT+TSH; Future    CKD (chronic kidney disease) stage 3, GFR 30-59 ml/min ([facility])  -     Lipid panel; Future    Hyperlipidemia, unspecified hyperlipidemia type    New onset a-fib ([address] )    Other orders  -     amLODIPine (NORVASC) 2 5 mg tablet; Take 2 5 mg by mouth daily          Subjective:      Patient ID: Noah Lemus is a 79 y o  female  Follow up  Hypertension,Aortic valve disease  The following portions of the patient's history were reviewed and updated as appropriate: allergies, current medications, past family history, past medical history, past social history, past surgical history, and problem list     Review of Systems   Constitutional: Negative  HENT: Negative for dental problem, drooling, ear discharge and ear pain  Eyes: Negative for discharge, redness and itching  Respiratory: Negative for apnea, cough and wheezing  Cardiovascular: Negative for chest pain and palpitations  Gastrointestinal: Negative for abdominal pain, blood in stool, diarrhea and vomiting  Endocrine: Negative for polydipsia, polyphagia and polyuria  Genitourinary: Negative for decreased urine volume, dysuria and frequency  Musculoskeletal: Negative for arthralgias, myalgias and neck stiffness  Skin: Negative for pallor and wound  Allergic/Immunologic: Negative for environmental allergies and food allergies  Neurological: Negative for facial asymmetry, light-headedness, numbness and headaches  Hematological: Negative for adenopathy  Does not bruise/bleed easily  Psychiatric/Behavioral: Negative for agitation, behavioral problems and confusion           Objective:      /80 (BP Location: Left arm, Patient Position: Sitting)   Pulse 80   Temp (!) 97 4 °F (36 3 °C)   Ht 5' 3" (1 6 m)   Wt 90 7 kg (200 lb) BMI 35 43 kg/m²          Physical Exam  Constitutional:       Appearance: Normal appearance  She is obese  HENT:      Head: Normocephalic  Nose: Nose normal       Mouth/Throat:      Mouth: Mucous membranes are moist    Eyes:      Pupils: Pupils are equal, round, and reactive to light  Neck:      Musculoskeletal: Neck supple  Cardiovascular:      Rate and Rhythm: Regular rhythm  Heart sounds: Normal heart sounds  Pulmonary:      Breath sounds: Normal breath sounds  Abdominal:      Palpations: Abdomen is soft  Musculoskeletal:         General: No swelling  Skin:     General: Skin is warm  Neurological:      General: No focal deficit present  Mental Status: She is alert and oriented to person, place, and time     Psychiatric:         Mood and Affect: Mood normal

## 2020-10-12 DIAGNOSIS — M10.30 GOUT DUE TO RENAL IMPAIRMENT, UNSPECIFIED CHRONICITY, UNSPECIFIED SITE: Primary | ICD-10-CM

## 2020-10-12 RX ORDER — ALLOPURINOL 300 MG/1
300 TABLET ORAL DAILY
Qty: 90 TABLET | Refills: 0 | Status: SHIPPED | OUTPATIENT
Start: 2020-10-12 | End: 2021-01-10

## 2020-10-26 DIAGNOSIS — Z79.4 TYPE 2 DIABETES MELLITUS WITHOUT COMPLICATION, WITH LONG-TERM CURRENT USE OF INSULIN (HCC): Primary | ICD-10-CM

## 2020-10-26 DIAGNOSIS — E11.9 TYPE 2 DIABETES MELLITUS WITHOUT COMPLICATION, WITH LONG-TERM CURRENT USE OF INSULIN (HCC): Primary | ICD-10-CM

## 2020-10-26 DIAGNOSIS — I10 ESSENTIAL HYPERTENSION: ICD-10-CM

## 2020-10-26 RX ORDER — AMLODIPINE BESYLATE 2.5 MG/1
2.5 TABLET ORAL DAILY
Qty: 90 TABLET | Refills: 0 | Status: SHIPPED | OUTPATIENT
Start: 2020-10-26 | End: 2021-01-27

## 2021-01-10 DIAGNOSIS — M10.30 GOUT DUE TO RENAL IMPAIRMENT, UNSPECIFIED CHRONICITY, UNSPECIFIED SITE: ICD-10-CM

## 2021-01-10 RX ORDER — ALLOPURINOL 300 MG/1
TABLET ORAL
Qty: 90 TABLET | Refills: 0 | Status: SHIPPED | OUTPATIENT
Start: 2021-01-10 | End: 2021-04-13 | Stop reason: SDUPTHER

## 2021-01-27 DIAGNOSIS — I10 ESSENTIAL HYPERTENSION: Primary | ICD-10-CM

## 2021-01-27 RX ORDER — METOPROLOL SUCCINATE 200 MG/1
TABLET, EXTENDED RELEASE ORAL
Qty: 90 TABLET | Refills: 3 | Status: SHIPPED | OUTPATIENT
Start: 2021-01-27 | End: 2022-04-21

## 2021-01-27 RX ORDER — AMLODIPINE BESYLATE 2.5 MG/1
TABLET ORAL
Qty: 90 TABLET | Refills: 0 | Status: SHIPPED | OUTPATIENT
Start: 2021-01-27 | End: 2021-05-13 | Stop reason: SDUPTHER

## 2021-01-28 DIAGNOSIS — I10 ESSENTIAL HYPERTENSION: Primary | ICD-10-CM

## 2021-01-28 RX ORDER — ATORVASTATIN CALCIUM 20 MG/1
20 TABLET, FILM COATED ORAL DAILY
Qty: 90 TABLET | Refills: 3 | Status: SHIPPED | OUTPATIENT
Start: 2021-01-28 | End: 2021-04-13 | Stop reason: SDUPTHER

## 2021-02-17 DIAGNOSIS — I25.118 CORONARY ARTERY DISEASE OF NATIVE HEART WITH STABLE ANGINA PECTORIS, UNSPECIFIED VESSEL OR LESION TYPE (HCC): Primary | ICD-10-CM

## 2021-02-17 RX ORDER — CLOPIDOGREL BISULFATE 75 MG/1
75 TABLET ORAL DAILY
Qty: 90 TABLET | Refills: 3 | Status: SHIPPED | OUTPATIENT
Start: 2021-02-17 | End: 2021-02-19

## 2021-02-19 DIAGNOSIS — I25.118 CORONARY ARTERY DISEASE OF NATIVE HEART WITH STABLE ANGINA PECTORIS, UNSPECIFIED VESSEL OR LESION TYPE (HCC): ICD-10-CM

## 2021-02-19 RX ORDER — CLOPIDOGREL BISULFATE 75 MG/1
TABLET ORAL
Qty: 90 TABLET | Refills: 3 | Status: SHIPPED | OUTPATIENT
Start: 2021-02-19 | End: 2021-05-13 | Stop reason: SDUPTHER

## 2021-02-25 ENCOUNTER — HOSPITAL ENCOUNTER (OUTPATIENT)
Dept: NON INVASIVE DIAGNOSTICS | Facility: CLINIC | Age: 81
Discharge: HOME | End: 2021-02-25
Payer: COMMERCIAL

## 2021-02-25 DIAGNOSIS — I65.23 ASYMPTOMATIC BILATERAL CAROTID ARTERY STENOSIS: ICD-10-CM

## 2021-02-25 DIAGNOSIS — I77.1 INNOMINATE ARTERY STENOSIS (HCC): ICD-10-CM

## 2021-02-25 PROCEDURE — 93880 EXTRACRANIAL BILAT STUDY: CPT | Performed by: SURGERY

## 2021-02-25 PROCEDURE — 93880 EXTRACRANIAL BILAT STUDY: CPT

## 2021-04-06 ENCOUNTER — OFFICE VISIT (OUTPATIENT)
Dept: INTERNAL MEDICINE CLINIC | Facility: CLINIC | Age: 81
End: 2021-04-06
Payer: COMMERCIAL

## 2021-04-06 VITALS
HEART RATE: 77 BPM | BODY MASS INDEX: 34.73 KG/M2 | TEMPERATURE: 98.6 F | SYSTOLIC BLOOD PRESSURE: 140 MMHG | WEIGHT: 196 LBS | DIASTOLIC BLOOD PRESSURE: 65 MMHG | OXYGEN SATURATION: 95 % | HEIGHT: 63 IN

## 2021-04-06 DIAGNOSIS — Z00.00 MEDICARE ANNUAL WELLNESS VISIT, SUBSEQUENT: Primary | ICD-10-CM

## 2021-04-06 DIAGNOSIS — E78.5 HYPERLIPIDEMIA, UNSPECIFIED HYPERLIPIDEMIA TYPE: ICD-10-CM

## 2021-04-06 DIAGNOSIS — I10 ESSENTIAL HYPERTENSION: ICD-10-CM

## 2021-04-06 DIAGNOSIS — N18.32 STAGE 3B CHRONIC KIDNEY DISEASE (HCC): ICD-10-CM

## 2021-04-06 DIAGNOSIS — I25.10 CORONARY ARTERY DISEASE, ANGINA PRESENCE UNSPECIFIED, UNSPECIFIED VESSEL OR LESION TYPE, UNSPECIFIED WHETHER NATIVE OR TRANSPLANTED HEART: ICD-10-CM

## 2021-04-06 PROCEDURE — G0439 PPPS, SUBSEQ VISIT: HCPCS | Performed by: INTERNAL MEDICINE

## 2021-04-06 PROCEDURE — 1160F RVW MEDS BY RX/DR IN RCRD: CPT | Performed by: INTERNAL MEDICINE

## 2021-04-06 PROCEDURE — 99214 OFFICE O/P EST MOD 30 MIN: CPT | Performed by: INTERNAL MEDICINE

## 2021-04-06 PROCEDURE — 1125F AMNT PAIN NOTED PAIN PRSNT: CPT | Performed by: INTERNAL MEDICINE

## 2021-04-06 PROCEDURE — 3078F DIAST BP <80 MM HG: CPT | Performed by: INTERNAL MEDICINE

## 2021-04-06 PROCEDURE — 3077F SYST BP >= 140 MM HG: CPT | Performed by: INTERNAL MEDICINE

## 2021-04-06 PROCEDURE — 1101F PT FALLS ASSESS-DOCD LE1/YR: CPT | Performed by: INTERNAL MEDICINE

## 2021-04-06 PROCEDURE — 3288F FALL RISK ASSESSMENT DOCD: CPT | Performed by: INTERNAL MEDICINE

## 2021-04-06 PROCEDURE — 1036F TOBACCO NON-USER: CPT | Performed by: INTERNAL MEDICINE

## 2021-04-06 PROCEDURE — 1170F FXNL STATUS ASSESSED: CPT | Performed by: INTERNAL MEDICINE

## 2021-04-06 NOTE — PROGRESS NOTES
Assessment/Plan:    No problem-specific Assessment & Plan notes found for this encounter  Diagnoses and all orders for this visit:    Medicare annual wellness visit, subsequent          Subjective:      Patient ID: Jose Dumont is a 79 y o  female  Follow up  Hypertension,  Aortic  Valve  Disease  The following portions of the patient's history were reviewed and updated as appropriate: allergies, current medications, past family history, past medical history, past social history, past surgical history, and problem list   BMI Counseling: Body mass index is 34 72 kg/m²  The BMI is above normal  Nutrition recommendations include reducing portion sizes, decreasing overall calorie intake, 3-5 servings of fruits/vegetables daily, reducing fast food intake, consuming healthier snacks and decreasing soda and/or juice intake  Review of Systems   Constitutional: Negative  HENT: Negative for dental problem, drooling, ear discharge and ear pain  Eyes: Negative for discharge, redness and itching  Respiratory: Negative for apnea, cough and wheezing  Cardiovascular: Negative for chest pain and palpitations  Gastrointestinal: Negative for abdominal pain, blood in stool, diarrhea and vomiting  Endocrine: Negative for polydipsia, polyphagia and polyuria  Genitourinary: Negative for decreased urine volume, dysuria and frequency  Musculoskeletal: Negative for arthralgias, myalgias and neck stiffness  Skin: Negative for pallor and wound  Allergic/Immunologic: Negative for environmental allergies and food allergies  Neurological: Negative for facial asymmetry, light-headedness, numbness and headaches  Hematological: Negative for adenopathy  Does not bruise/bleed easily  Psychiatric/Behavioral: Negative for agitation, behavioral problems and confusion           Objective:      /65 (BP Location: Left arm, Patient Position: Sitting, Cuff Size: Standard)   Pulse 77   Temp 98 6 °F (37 °C) (Temporal)   Ht 5' 3" (1 6 m)   Wt 88 9 kg (196 lb)   SpO2 95%   BMI 34 72 kg/m²          Physical Exam  Constitutional:       Appearance: Normal appearance  She is obese  HENT:      Head: Normocephalic  Nose: Nose normal       Mouth/Throat:      Mouth: Mucous membranes are moist    Eyes:      Pupils: Pupils are equal, round, and reactive to light  Neck:      Musculoskeletal: Neck supple  Cardiovascular:      Rate and Rhythm: Regular rhythm  Heart sounds: Normal heart sounds  Pulmonary:      Breath sounds: Normal breath sounds  Abdominal:      Palpations: Abdomen is soft  Musculoskeletal:         General: No swelling  Skin:     General: Skin is warm  Neurological:      General: No focal deficit present  Mental Status: She is alert and oriented to person, place, and time  Psychiatric:         Mood and Affect: Mood normal         Assessment and Plan:     Problem List Items Addressed This Visit     None           Preventive health issues were discussed with patient, and age appropriate screening tests were ordered as noted in patient's After Visit Summary  Personalized health advice and appropriate referrals for health education or preventive services given if needed, as noted in patient's After Visit Summary       History of Present Illness:     Patient presents for Medicare Annual Wellness visit    Patient Care Team:  Tayler Harper MD as PCP - General  Slaly Garcia as PCP - [address] [place], [place] [id number] [facility]  MD Trisha Pina Check Doctor, EVITA Chambers MD Lurena Pock, MD Lindwood Citron, PA-C as Physician Assistant (Vascular Surgery)     Problem List:     Patient Active Problem List   Diagnosis    Cataract, left eye    Cataract of right eye    Asymptomatic bilateral carotid artery stenosis    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 )     Carotid stenosis     Cataract of right eye 10/25/2016    Cataract, left eye 10/18/2016    Chronic systolic CHF (congestive heart failure) (HCC)     CKD (chronic kidney disease) stage 3, GFR 30-59 ml/min     Coronary artery disease     Diabetes mellitus ([facility] )     Type 2    Dyslipidemia     Gout     Hyperlipidemia     Hypertension     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    Irritable bladder     PAD (peripheral artery disease) (HCC)     Renal artery stenosis (HCC)     Right     Past Surgical History:   Procedure Laterality Date    ABDOMINAL AORTIC ANEURYSM REPAIR      AORTIC VALVE REPLACEMENT      CAROTID ENDARTERECTOMY Right 2017    CATARACT EXTRACTION       SECTION      CORONARY ARTERY BYPASS GRAFT      CORONARY STENT PLACEMENT      CYSTOSCOPY      HYSTERECTOMY      MAMMO (HISTORICAL)  06/10/2019    OH THROMBOENDARTECTMY [place] INCIS Right 2017    Procedure: CAROTID ENDARTERECTOMY WITH RETROGRADE AND INNOMINATE ARTERY STENTING;  Surgeon: Lizzeth Dawson MD;  [facility];  Service: Vascular    OH XCAPSL CTRC RMVL INSJ IO LENS PROSTH W/O ECP Right 10/25/2016    Procedure: OD EXTRACTION EXTRACAPSULAR CATARACT PHACO INTRAOCULAR LENS (IOL); Surgeon: Carrol Sacks, MD;  [facility];  Service: Ophthalmology    OH XCAPSL CTRC RMVL INSJ IO LENS PROSTH W/O ECP Left 10/18/2016    Procedure: OS EXTRACTION EXTRACAPSULAR CATARACT PHACO INTRAOCULAR LENS (IOL);   Surgeon: Carrol Sacks, MD;  [facility];  Service: Ophthalmology    VASCULAR SURGERY        Family History:     Family History   Problem Relation Age of Onset    Diabetes Mother       Social History:        Social History     Socioeconomic History    Marital status:       Spouse name: None    Number of children: None    Years of education: None    Highest education level: None   Occupational History    None   Social Needs    Financial resource strain: None    Food insecurity     Worry: None     Inability: None    Transportation needs     Medical: None     Non-medical: None   Tobacco Use    Smoking status: Former Smoker    Smokeless tobacco: Never Used    Tobacco comment: quit 20 years ago   Substance and Sexual Activity    Alcohol use: No    Drug use: No    Sexual activity: None   Lifestyle    Physical activity     Days per week: None     Minutes per session: None    Stress: None   Relationships    Social connections     Talks on phone: None     Gets together: None     Attends Buddhist service: None     Active member of club or organization: None     Attends meetings of clubs or organizations: None     Relationship status: None    Intimate partner violence     Fear of current or ex partner: None     Emotionally abused: None     Physically abused: None     Forced sexual activity: None   Other Topics Concern    None   Social History Narrative    None      Medications and Allergies:     Current Outpatient Medications   Medication Sig Dispense Refill    acetaminophen (TYLENOL) 325 mg tablet Take 650 mg by mouth every 6 (six) hours as needed for mild pain      allopurinol (ZYLOPRIM) 300 mg tablet TAKE 1 TABLET(300 MG) BY MOUTH DAILY 90 tablet 0    amLODIPine (NORVASC) 2 5 mg tablet TAKE 1 TABLET(2 5 MG) BY MOUTH DAILY 90 tablet 0    aspirin 81 mg chewable tablet Chew 1 tablet daily for 30 days 30 tablet 0    atorvastatin (LIPITOR) 20 mg tablet TAKE 1 TABLET BY MOUTH EVERY DAY 90 tablet 0    atorvastatin (LIPITOR) 20 mg tablet Take 1 tablet (20 mg total) by mouth daily 90 tablet 3    clopidogrel (PLAVIX) 75 mg tablet TAKE 1 TABLET BY MOUTH EVERY DAY 90 tablet 3    metFORMIN (GLUCOPHAGE) 500 mg tablet TAKE 1 TABLET BY MOUTH DAILY 90 tablet 3    metoprolol succinate (TOPROL-XL) 200 MG 24 hr tablet TAKE 1 TABLET BY MOUTH EVERY DAY 90 tablet 3    nitroglycerin (NITROSTAT) 0 4 mg SL tablet Place 0 4 mg under the tongue every 5 (five) minutes as needed for chest pain      nitroglycerin (NITROSTAT) 0 4 mg SL tablet Place 1 tablet under the tongue every 5 (five) minutes as needed       No current facility-administered medications for this visit  Allergies   Allergen Reactions    Medical Tape     Other Rash     Pt states she is allergic to tape adhesive      Immunizations:     Immunization History   Administered Date(s) Administered    INFLUENZA 09/30/2020    influenza, trivalent, adjuvanted 09/30/2020      Health Maintenance: There are no preventive care reminders to display for this patient  Topic Date Due    COVID-19 Vaccine (1) Never done    DTaP,Tdap,and Td Vaccines (1 - Tdap) Never done    Pneumococcal Vaccine: 65+ Years (1 of 1 - PPSV23) Never done      Medicare Health Risk Assessment:     /65 (BP Location: Left arm, Patient Position: Sitting, Cuff Size: Standard)   Pulse 77   Temp 98 6 °F (37 °C) (Temporal)   Ht 5' 3" (1 6 m)   Wt 88 9 kg (196 lb)   SpO2 95%   BMI 34 72 kg/m²      Joe Muhammad is here for her Subsequent Wellness visit  Health Risk Assessment:   Patient rates overall health as fair  Patient feels that their physical health rating is same  Patient is very satisfied with their life  Eyesight was rated as same  Hearing was rated as same  Patient feels that their emotional and mental health rating is same  Patients states they are never, rarely angry  Patient states they are never, rarely unusually tired/fatigued  Pain experienced in the last 7 days has been none  Patient states that she has experienced no weight loss or gain in last 6 months  Fall Risk Screening:    In the past year, patient has experienced: no history of falling in past year      Urinary Incontinence Screening:   Patient has leaked urine accidently in the last six months  Home Safety:  Patient has trouble with stairs inside or outside of their home  Patient has working smoke alarms and has working carbon monoxide detector  Home safety hazards include: none  Nutrition:   Current diet is Regular  Medications:   Patient is currently taking over-the-counter supplements  OTC medications include: see medication list  Patient is able to manage medications  Activities of Daily Living (ADLs)/Instrumental Activities of Daily Living (IADLs):   Walk and transfer into and out of bed and chair?: Yes  Dress and groom yourself?: Yes    Bathe or shower yourself?: Yes    Feed yourself? Yes  Do your laundry/housekeeping?: Yes  Manage your money, pay your bills and track your expenses?: Yes  Make your own meals?: Yes    Do your own shopping?: Yes    Previous Hospitalizations:   Any hospitalizations or ED visits within the last 12 months?: No      Advance Care Planning:   Living will: Yes    Durable POA for healthcare: Yes    Advanced directive: Yes      PREVENTIVE SCREENINGS      Cardiovascular Screening:    General: Screening Not Indicated and History Lipid Disorder      Diabetes Screening:     General: Screening Not Indicated and History Diabetes      Cervical Cancer Screening:    General: Screening Not Indicated      Lung Cancer Screening:     General: Screening Not Indicated    Screening, Brief Intervention, and Referral to Treatment (SBIRT)    Screening  Typical number of drinks in a day: 0  Typical number of drinks in a week: 0  Interpretation: Low risk drinking behavior      Single Item Drug Screening:  How often have you used an illegal drug (including marijuana) or a prescription medication for non-medical reasons in the past year? never    Single Item Drug Screen Score: 0  Interpretation: Negative screen for possible drug use disorder      Terry Alvarez Tarik Winters MD

## 2021-04-06 NOTE — PATIENT INSTRUCTIONS
Medicare Preventive Visit Patient Instructions  Thank you for completing your Welcome to Medicare Visit or Medicare Annual Wellness Visit today  Your next wellness visit will be due in one year (4/7/2022)  The screening/preventive services that you may require over the next 5-10 years are detailed below  Some tests may not apply to you based off risk factors and/or age  Screening tests ordered at today's visit but not completed yet may show as past due  Also, please note that scanned in results may not display below  Preventive Screenings:  Service Recommendations Previous Testing/Comments   Colorectal Cancer Screening  * Colonoscopy    * Fecal Occult Blood Test (FOBT)/Fecal Immunochemical Test (FIT)  * Fecal DNA/Cologuard Test  * Flexible Sigmoidoscopy Age: 54-65 years old   Colonoscopy: every 10 years (may be performed more frequently if at higher risk)  OR  FOBT/FIT: every 1 year  OR  Cologuard: every 3 years  OR  Sigmoidoscopy: every 5 years  Screening may be recommended earlier than age 48 if at higher risk for colorectal cancer  Also, an individualized decision between you and your healthcare provider will decide whether screening between the ages of 74-80 would be appropriate  Colonoscopy: Not on file  FOBT/FIT: Not on file  Cologuard: Not on file  Sigmoidoscopy: Not on file          Breast Cancer Screening Age: 36 years old  Frequency: every 1-2 years  Not required if history of left and right mastectomy Mammogram: Not on file        Cervical Cancer Screening Between the ages of 21-29, pap smear recommended once every 3 years  Between the ages of 33-67, can perform pap smear with HPV co-testing every 5 years     Recommendations may differ for women with a history of total hysterectomy, cervical cancer, or abnormal pap smears in past  Pap Smear: Not on file    Screening Not Indicated   Hepatitis C Screening Once for adults born between [facility]  More frequently in patients at high risk for Hepatitis C Hep C Antibody: Not on file        Diabetes Screening 1-2 times per year if you're at risk for diabetes or have pre-diabetes Fasting glucose: No results in last 5 years   A1C: 7 9 %    Screening Not Indicated  History Diabetes   Cholesterol Screening Once every 5 years if you don't have a lipid disorder  May order more often based on risk factors  Lipid panel: 10/20/2020    Screening Not Indicated  History Lipid Disorder     Other Preventive Screenings Covered by Medicare:  1  Abdominal Aortic Aneurysm (AAA) Screening: covered once if your at risk  You're considered to be at risk if you have a family history of AAA  2  Lung Cancer Screening: covers low dose CT scan once per year if you meet all of the following conditions: (1) Age 50-69; (2) No signs or symptoms of lung cancer; (3) Current smoker or have quit smoking within the last 15 years; (4) You have a tobacco smoking history of at least 30 pack years (packs per day multiplied by number of years you smoked); (5) You get a written order from a healthcare provider  3  Glaucoma Screening: covered annually if you're considered high risk: (1) You have diabetes OR (2) Family history of glaucoma OR (3)  aged 48 and older OR (3)  American aged 72 and older  3  Osteoporosis Screening: covered every 2 years if you meet one of the following conditions: (1) You're estrogen deficient and at risk for osteoporosis based off medical history and other findings; (2) Have a vertebral abnormality; (3) On glucocorticoid therapy for more than 3 months; (4) Have primary hyperparathyroidism; (5) On osteoporosis medications and need to assess response to drug therapy  · Last bone density test (DXA Scan): Not on file  5  HIV Screening: covered annually if you're between the age of 12-76  Also covered annually if you are younger than 13 and older than 72 with risk factors for HIV infection   For pregnant patients, it is covered up to 3 times per pregnancy  Immunizations:  Immunization Recommendations   Influenza Vaccine Annual influenza vaccination during flu season is recommended for all persons aged >= 6 months who do not have contraindications   Pneumococcal Vaccine (Prevnar and Pneumovax)  * Prevnar = PCV13  * Pneumovax = PPSV23   Adults 25-60 years old: 1-3 doses may be recommended based on certain risk factors  Adults 72 years old: Prevnar (PCV13) vaccine recommended followed by Pneumovax (PPSV23) vaccine  If already received PPSV23 since turning 65, then PCV13 recommended at least one year after PPSV23 dose  Hepatitis B Vaccine 3 dose series if at intermediate or high risk (ex: diabetes, end stage renal disease, liver disease)   Tetanus (Td) Vaccine - COST NOT COVERED BY MEDICARE PART B Following completion of primary series, a booster dose should be given every 10 years to maintain immunity against tetanus  Td may also be given as tetanus wound prophylaxis  Tdap Vaccine - COST NOT COVERED BY MEDICARE PART B Recommended at least once for all adults  For pregnant patients, recommended with each pregnancy  Shingles Vaccine (Shingrix) - COST NOT COVERED BY MEDICARE PART B  2 shot series recommended in those aged 48 and above     Health Maintenance Due:  There are no preventive care reminders to display for this patient  Immunizations Due:      Topic Date Due    COVID-19 Vaccine (1) Never done    DTaP,Tdap,and Td Vaccines (1 - Tdap) Never done    Pneumococcal Vaccine: 65+ Years (1 of 1 - PPSV23) Never done     Advance Directives   What are advance directives? Advance directives are legal documents that state your wishes and plans for medical care  These plans are made ahead of time in case you lose your ability to make decisions for yourself  Advance directives can apply to any medical decision, such as the treatments you want, and if you want to donate organs  What are the types of advance directives?   There are many types of advance directives, and each state has rules about how to use them  You may choose a combination of any of the following:  · Living will: This is a written record of the treatment you want  You can also choose which treatments you do not want, which to limit, and which to stop at a certain time  This includes surgery, medicine, IV fluid, and tube feedings  · Durable power of  for healthcare [facility]): This is a written record that states who you want to make healthcare choices for you when you are unable to make them for yourself  This person, called a proxy, is usually a family member or a friend  You may choose more than 1 proxy  · Do not resuscitate (DNR) order:  A DNR order is used in case your heart stops beating or you stop breathing  It is a request not to have certain forms of treatment, such as CPR  A DNR order may be included in other types of advance directives  · Medical directive: This covers the care that you want if you are in a coma, near death, or unable to make decisions for yourself  You can list the treatments you want for each condition  Treatment may include pain medicine, surgery, blood transfusions, dialysis, IV or tube feedings, and a ventilator (breathing machine)  · Values history: This document has questions about your views, beliefs, and how you feel and think about life  This information can help others choose the care that you would choose  Why are advance directives important? An advance directive helps you control your care  Although spoken wishes may be used, it is better to have your wishes written down  Spoken wishes can be misunderstood, or not followed  Treatments may be given even if you do not want them  An advance directive may make it easier for your family to make difficult choices about your care  Urinary Incontinence   Urinary incontinence (UI)  is when you lose control of your bladder  UI develops because your bladder cannot store or empty urine properly   The 3 most common types of UI are stress incontinence, urge incontinence, or both  Medicines:   · May be given to help strengthen your bladder control  Report any side effects of medication to your healthcare provider  Do pelvic muscle exercises often:  Your pelvic muscles help you stop urinating  Squeeze these muscles tight for 5 seconds, then relax for 5 seconds  Gradually work up to squeezing for 10 seconds  Do 3 sets of 15 repetitions a day, or as directed  This will help strengthen your pelvic muscles and improve bladder control  Train your bladder:  Go to the bathroom at set times, such as every 2 hours, even if you do not feel the urge to go  You can also try to hold your urine when you feel the urge to go  For example, hold your urine for 5 minutes when you feel the urge to go  As that becomes easier, hold your urine for 10 minutes  Self-care:   · Keep a UI record  Write down how often you leak urine and how much you leak  Make a note of what you were doing when you leaked urine  · Drink liquids as directed  You may need to limit the amount of liquid you drink to help control your urine leakage  Do not drink any liquid right before you go to bed  Limit or do not have drinks that contain caffeine or alcohol  · Prevent constipation  Eat a variety of high-fiber foods  Good examples are high-fiber cereals, beans, vegetables, and whole-grain breads  Walking is the best way to trigger your intestines to have a bowel movement  · Exercise regularly and maintain a healthy weight  Weight loss and exercise will decrease pressure on your bladder and help you control your leakage  · Use a catheter as directed  to help empty your bladder  A catheter is a tiny, plastic tube that is put into your bladder to drain your urine  · Go to behavior therapy as directed  Behavior therapy may be used to help you learn to control your urge to urinate      Weight Management   Why it is important to manage your weight: Being overweight increases your risk of health conditions such as heart disease, high blood pressure, type 2 diabetes, and certain types of cancer  It can also increase your risk for osteoarthritis, sleep apnea, and other respiratory problems  Aim for a slow, steady weight loss  Even a small amount of weight loss can lower your risk of health problems  How to lose weight safely:  A safe and healthy way to lose weight is to eat fewer calories and get regular exercise  You can lose up about 1 pound a week by decreasing the number of calories you eat by 500 calories each day  Healthy meal plan for weight management:  A healthy meal plan includes a variety of foods, contains fewer calories, and helps you stay healthy  A healthy meal plan includes the following:  · Eat whole-grain foods more often  A healthy meal plan should contain fiber  Fiber is the part of grains, fruits, and vegetables that is not broken down by your body  Whole-grain foods are healthy and provide extra fiber in your diet  Some examples of whole-grain foods are whole-wheat breads and pastas, oatmeal, brown rice, and bulgur  · Eat a variety of vegetables every day  Include dark, leafy greens such as spinach, kale, danilo greens, and mustard greens  Eat yellow and orange vegetables such as carrots, sweet potatoes, and winter squash  · Eat a variety of fruits every day  Choose fresh or canned fruit (canned in its own juice or light syrup) instead of juice  Fruit juice has very little or no fiber  · Eat low-fat dairy foods  Drink fat-free (skim) milk or 1% milk  Eat fat-free yogurt and low-fat cottage cheese  Try low-fat cheeses such as mozzarella and other reduced-fat cheeses  · Choose meat and other protein foods that are low in fat  Choose beans or other legumes such as split peas or lentils  Choose fish, skinless poultry (chicken or turkey), or lean cuts of red meat (beef or pork)   Before you cook meat or poultry, cut off any visible fat    · Use less fat and oil  Try baking foods instead of frying them  Add less fat, such as margarine, sour cream, regular salad dressing and mayonnaise to foods  Eat fewer high-fat foods  Some examples of high-fat foods include french fries, doughnuts, ice cream, and cakes  · Eat fewer sweets  Limit foods and drinks that are high in sugar  This includes candy, cookies, regular soda, and sweetened drinks  Exercise:  Exercise at least 30 minutes per day on most days of the week  Some examples of exercise include walking, biking, dancing, and swimming  You can also fit in more physical activity by taking the stairs instead of the elevator or parking farther away from stores  Ask your healthcare provider about the best exercise plan for you  © Copyright [employer] 2018 Information is for End User's use only and may not be sold, redistributed or otherwise used for commercial purposes   All illustrations and images included in CareNotes® are the copyrighted property of [employer]  or [address] [place] [address]

## 2021-04-13 DIAGNOSIS — M10.30 GOUT DUE TO RENAL IMPAIRMENT, UNSPECIFIED CHRONICITY, UNSPECIFIED SITE: ICD-10-CM

## 2021-04-13 DIAGNOSIS — I10 ESSENTIAL HYPERTENSION: ICD-10-CM

## 2021-04-13 RX ORDER — ATORVASTATIN CALCIUM 20 MG/1
20 TABLET, FILM COATED ORAL DAILY
Qty: 90 TABLET | Refills: 3 | Status: SHIPPED | OUTPATIENT
Start: 2021-04-13 | End: 2022-04-04

## 2021-04-13 RX ORDER — ALLOPURINOL 300 MG/1
300 TABLET ORAL DAILY
Qty: 90 TABLET | Refills: 3 | Status: SHIPPED | OUTPATIENT
Start: 2021-04-13 | End: 2022-04-04

## 2021-04-30 DIAGNOSIS — Z79.4 TYPE 2 DIABETES MELLITUS WITHOUT COMPLICATION, WITH LONG-TERM CURRENT USE OF INSULIN (HCC): ICD-10-CM

## 2021-04-30 DIAGNOSIS — E11.9 TYPE 2 DIABETES MELLITUS WITHOUT COMPLICATION, WITH LONG-TERM CURRENT USE OF INSULIN (HCC): ICD-10-CM

## 2021-05-13 DIAGNOSIS — I25.118 CORONARY ARTERY DISEASE OF NATIVE HEART WITH STABLE ANGINA PECTORIS, UNSPECIFIED VESSEL OR LESION TYPE (HCC): ICD-10-CM

## 2021-05-13 DIAGNOSIS — I10 ESSENTIAL HYPERTENSION: ICD-10-CM

## 2021-05-13 RX ORDER — AMLODIPINE BESYLATE 2.5 MG/1
2.5 TABLET ORAL DAILY
Qty: 90 TABLET | Refills: 1 | Status: SHIPPED | OUTPATIENT
Start: 2021-05-13 | End: 2021-11-22 | Stop reason: SDUPTHER

## 2021-05-13 RX ORDER — CLOPIDOGREL BISULFATE 75 MG/1
75 TABLET ORAL DAILY
Qty: 90 TABLET | Refills: 3 | Status: SHIPPED | OUTPATIENT
Start: 2021-05-13 | End: 2022-01-30

## 2021-08-02 ENCOUNTER — RA CDI HCC (OUTPATIENT)
Dept: OTHER | Facility: HOSPITAL | Age: 81
End: 2021-08-02

## 2021-08-02 NOTE — PROGRESS NOTES
[facility]  [facility]             Chart reviewed, (number of) suggestions sent to provider: 5                  Patients insurance company: [employer] (Medicare Advantage and Commercial)     Visit status: Patient canceled the appointment        [facility]  coding opportunities             Chart reviewed, (number of) suggestions sent to provider: 5  [facility]  coding opportunities             Chart reviewed, (number of) suggestions sent to provider: 5                  Patients insurance company: [employer] (Medicare Advantage and Commercial)              DX:  E11 51-Type 2 diabetes mellitus with diabetic peripheral angiopathy without gangrene  I27 20-Pulmonary hypertension, unspecified-per dc summary 2017-echo-last in system 2017-TRICUSPID VALVE:  There was moderate regurgitation  Estimated peak PA pressure was 55 mmHg  The findings suggest moderate pulmonary hypertension  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  I48 91-Unspecified atrial fibrillation  I73  9-Peripheral vascular disease, unspecified    Chronic systolic heart failure noted in only one place-dc summary in 2017-but echo doesn't support this-may have supposed to have been diastolic per echo results as ef was in the 60's-noted pulmonary htn-sent for this and will cover the hcc 85 and other hcc captured already but hesitant to send for I130-covered with query sent with solid support LM    Noted I771 code-and dx noted for it-isn't correct code and is s/p stent-unsure on current data and hcc is captured-108 or will be with current query LM               Patients insurance company: [employer] ([email or web address]

## 2021-08-24 ENCOUNTER — TELEPHONE (OUTPATIENT)
Dept: VASCULAR SURGERY | Facility: CLINIC | Age: 81
End: 2021-08-24

## 2021-08-24 NOTE — TELEPHONE ENCOUNTER
----- Message from Ro Macias PA-C sent at 8/13/2021  2:48 PM EDT -----  I'm not sure what happened here with this patient  I saw her in August '20 and the plan was for repeat carotid duplex in 6 months (feb '21 duplex you forwarded) with f/u appt with Dr Bolivar Calvo to discuss  It doesn't look like that ever happened  I would recommend getting a repeat carotid duplex now and f/u in office with Dr Bolivar Calvo to review and reassess  Ana Yoder  ----- Message -----  From: Felix Bennett  Sent: 8/13/2021  10:53 AM EDT  To: DMITRI Maravilla,        Can you please advise use on this patient  The patients consensus letter does not have a follow up date for when the next test should be done  The patient was supposed to follow up with Bolivar Calvo in Feb of 2021 after her last CV doppler       Thank you,         Juan Briseno

## 2021-09-16 ENCOUNTER — TRANSCRIBE ORDERS (OUTPATIENT)
Dept: VASCULAR SURGERY | Facility: CLINIC | Age: 81
End: 2021-09-16

## 2021-09-16 DIAGNOSIS — I65.23 ASYMPTOMATIC BILATERAL CAROTID ARTERY STENOSIS: Primary | ICD-10-CM

## 2021-09-16 NOTE — TELEPHONE ENCOUNTER
Called patient to schedule CV now and f/u office visit with Dr Radha Ramirez, per [facility] note  Left message    3rd attempt, sent letter

## 2021-11-11 ENCOUNTER — HOSPITAL ENCOUNTER (OUTPATIENT)
Dept: NON INVASIVE DIAGNOSTICS | Facility: CLINIC | Age: 81
Discharge: HOME | End: 2021-11-11
Payer: COMMERCIAL

## 2021-11-11 DIAGNOSIS — I65.23 ASYMPTOMATIC BILATERAL CAROTID ARTERY STENOSIS: ICD-10-CM

## 2021-11-11 PROCEDURE — 93880 EXTRACRANIAL BILAT STUDY: CPT | Performed by: SURGERY

## 2021-11-11 PROCEDURE — 93880 EXTRACRANIAL BILAT STUDY: CPT

## 2021-11-22 DIAGNOSIS — I10 ESSENTIAL HYPERTENSION: ICD-10-CM

## 2021-11-22 RX ORDER — AMLODIPINE BESYLATE 2.5 MG/1
2.5 TABLET ORAL DAILY
Qty: 90 TABLET | Refills: 1 | Status: SHIPPED | OUTPATIENT
Start: 2021-11-22

## 2021-11-29 ENCOUNTER — OFFICE VISIT (OUTPATIENT)
Dept: VASCULAR SURGERY | Facility: CLINIC | Age: 81
End: 2021-11-29
Payer: COMMERCIAL

## 2021-11-29 VITALS
HEIGHT: 63 IN | BODY MASS INDEX: 35.08 KG/M2 | HEART RATE: 59 BPM | SYSTOLIC BLOOD PRESSURE: 186 MMHG | WEIGHT: 198 LBS | DIASTOLIC BLOOD PRESSURE: 94 MMHG | RESPIRATION RATE: 16 BRPM

## 2021-11-29 DIAGNOSIS — I65.23 ASYMPTOMATIC BILATERAL CAROTID ARTERY STENOSIS: Primary | ICD-10-CM

## 2021-11-29 DIAGNOSIS — I77.1 INNOMINATE ARTERY STENOSIS (HCC): ICD-10-CM

## 2021-11-29 PROCEDURE — 1036F TOBACCO NON-USER: CPT | Performed by: SURGERY

## 2021-11-29 PROCEDURE — 1160F RVW MEDS BY RX/DR IN RCRD: CPT | Performed by: SURGERY

## 2021-11-29 PROCEDURE — 99214 OFFICE O/P EST MOD 30 MIN: CPT | Performed by: SURGERY

## 2022-01-10 ENCOUNTER — VBI (OUTPATIENT)
Dept: ADMINISTRATIVE | Facility: OTHER | Age: 82
End: 2022-01-10

## 2022-01-30 DIAGNOSIS — I25.118 CORONARY ARTERY DISEASE OF NATIVE HEART WITH STABLE ANGINA PECTORIS, UNSPECIFIED VESSEL OR LESION TYPE (HCC): ICD-10-CM

## 2022-01-30 RX ORDER — CLOPIDOGREL BISULFATE 75 MG/1
TABLET ORAL
Qty: 90 TABLET | Refills: 3 | Status: SHIPPED | OUTPATIENT
Start: 2022-01-30

## 2022-04-03 DIAGNOSIS — M10.30 GOUT DUE TO RENAL IMPAIRMENT, UNSPECIFIED CHRONICITY, UNSPECIFIED SITE: ICD-10-CM

## 2022-04-03 DIAGNOSIS — I10 ESSENTIAL HYPERTENSION: ICD-10-CM

## 2022-04-04 RX ORDER — ATORVASTATIN CALCIUM 20 MG/1
TABLET, FILM COATED ORAL
Qty: 90 TABLET | Refills: 3 | Status: SHIPPED | OUTPATIENT
Start: 2022-04-04

## 2022-04-04 RX ORDER — ALLOPURINOL 300 MG/1
TABLET ORAL
Qty: 90 TABLET | Refills: 3 | Status: SHIPPED | OUTPATIENT
Start: 2022-04-04

## 2022-04-18 DIAGNOSIS — E11.9 TYPE 2 DIABETES MELLITUS WITHOUT COMPLICATION, WITH LONG-TERM CURRENT USE OF INSULIN (HCC): ICD-10-CM

## 2022-04-18 DIAGNOSIS — Z79.4 TYPE 2 DIABETES MELLITUS WITHOUT COMPLICATION, WITH LONG-TERM CURRENT USE OF INSULIN (HCC): ICD-10-CM

## 2022-04-21 ENCOUNTER — RA CDI HCC (OUTPATIENT)
Dept: OTHER | Facility: HOSPITAL | Age: 82
End: 2022-04-21

## 2022-04-21 DIAGNOSIS — I10 ESSENTIAL HYPERTENSION: ICD-10-CM

## 2022-04-21 RX ORDER — METOPROLOL SUCCINATE 200 MG/1
TABLET, EXTENDED RELEASE ORAL
Qty: 90 TABLET | Refills: 4 | Status: SHIPPED | OUTPATIENT
Start: 2022-04-21

## 2022-04-21 NOTE — PROGRESS NOTES
[facility]  coding opportunities       Chart Reviewed number of suggestions sent to Provider: 1     Patients Insurance     Medicare Insurance: [employer] Advantage          Appt on 4/28/22    e11 22

## 2022-04-28 ENCOUNTER — OFFICE VISIT (OUTPATIENT)
Dept: INTERNAL MEDICINE CLINIC | Facility: CLINIC | Age: 82
End: 2022-04-28
Payer: COMMERCIAL

## 2022-04-28 VITALS
SYSTOLIC BLOOD PRESSURE: 136 MMHG | HEIGHT: 63 IN | WEIGHT: 189 LBS | DIASTOLIC BLOOD PRESSURE: 90 MMHG | TEMPERATURE: 98 F | HEART RATE: 68 BPM | BODY MASS INDEX: 33.49 KG/M2 | OXYGEN SATURATION: 97 %

## 2022-04-28 DIAGNOSIS — E11.22 TYPE 2 DIABETES MELLITUS WITH CHRONIC KIDNEY DISEASE, WITHOUT LONG-TERM CURRENT USE OF INSULIN, UNSPECIFIED CKD STAGE (HCC): ICD-10-CM

## 2022-04-28 DIAGNOSIS — N18.32 STAGE 3B CHRONIC KIDNEY DISEASE (HCC): ICD-10-CM

## 2022-04-28 DIAGNOSIS — I77.1 INNOMINATE ARTERY STENOSIS (HCC): ICD-10-CM

## 2022-04-28 DIAGNOSIS — Z00.00 MEDICARE ANNUAL WELLNESS VISIT, INITIAL: ICD-10-CM

## 2022-04-28 DIAGNOSIS — E03.9 HYPOTHYROIDISM, UNSPECIFIED TYPE: Primary | ICD-10-CM

## 2022-04-28 DIAGNOSIS — D64.9 ANEMIA, UNSPECIFIED TYPE: ICD-10-CM

## 2022-04-28 DIAGNOSIS — I48.91 NEW ONSET A-FIB (HCC): ICD-10-CM

## 2022-04-28 PROCEDURE — G0439 PPPS, SUBSEQ VISIT: HCPCS | Performed by: INTERNAL MEDICINE

## 2022-04-28 PROCEDURE — 3725F SCREEN DEPRESSION PERFORMED: CPT | Performed by: INTERNAL MEDICINE

## 2022-04-28 PROCEDURE — 1125F AMNT PAIN NOTED PAIN PRSNT: CPT | Performed by: INTERNAL MEDICINE

## 2022-04-28 PROCEDURE — 1036F TOBACCO NON-USER: CPT | Performed by: INTERNAL MEDICINE

## 2022-04-28 PROCEDURE — 1160F RVW MEDS BY RX/DR IN RCRD: CPT | Performed by: INTERNAL MEDICINE

## 2022-04-28 PROCEDURE — 1170F FXNL STATUS ASSESSED: CPT | Performed by: INTERNAL MEDICINE

## 2022-04-28 PROCEDURE — 3288F FALL RISK ASSESSMENT DOCD: CPT | Performed by: INTERNAL MEDICINE

## 2022-04-28 NOTE — PROGRESS NOTES
Assessment and Plan:     Problem List Items Addressed This Visit     None           Preventive health issues were discussed with patient, and age appropriate screening tests were ordered as noted in patient's After Visit Summary  Personalized health advice and appropriate referrals for health education or preventive services given if needed, as noted in patient's After Visit Summary       History of Present Illness:     Patient presents for Medicare Annual Wellness visit    Patient Care Team:  Linda Shah MD as PCP - [facility] as PCP - [address] [place] [address],[address] (RTE)  Jim Guzman MD  [facility] Doctor, EVITA Ruiz MD Mat Shiley, MD Kelle Court, PA-C as Physician Assistant (Vascular Surgery)     Problem List:     Patient Active Problem List   Diagnosis    Cataract, left eye    Cataract of right eye    Asymptomatic bilateral carotid artery stenosis    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[address] )    Demand ischemia of myocardium ([address] )    Hyperlipidemia    Hypertension    CKD (chronic kidney disease) stage 3, GFR 30-59 ml/min ([address] )    Coronary artery disease    Non-ST elevation myocardial infarction (NSTEMI), type 2    New onset a-fib ([address] )    Intermittent claudication ([address] )      Past Medical and Surgical History:     Past Medical History:   Diagnosis Date    Aortic valve stenosis     Aorto-iliac disease ([address] )     Carotid stenosis     Cataract of right eye 10/25/2016    Cataract, left eye 10/18/2016    Chronic systolic CHF (congestive heart failure) (HCC)     CKD (chronic kidney disease) stage 3, GFR 30-59 ml/min ([address] )     Coronary artery disease     Diabetes mellitus ([address] )     Type 2    Dyslipidemia     Gout     Hyperlipidemia     Hypertension     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[address] )     Right    Irritable bladder     PAD (peripheral artery disease) ([address] )     Renal artery stenosis ([address] )     Right     Past Surgical History:   Procedure Laterality Date    ABDOMINAL AORTIC ANEURYSM REPAIR      AORTIC VALVE REPLACEMENT      CAROTID ENDARTERECTOMY Right 2017    CATARACT EXTRACTION       SECTION      CORONARY ARTERY BYPASS GRAFT      CORONARY STENT PLACEMENT      CYSTOSCOPY      HYSTERECTOMY      MAMMO (HISTORICAL)  06/10/2019    MI THROMBOENDARTECTMY [place] INCIS Right 2017    Procedure: CAROTID ENDARTERECTOMY WITH RETROGRADE AND INNOMINATE ARTERY STENTING;  Surgeon: Odin Allen MD;  [facility];  Service: Vascular    MI XCAPSL CTRC RMVL INSJ IO LENS PROSTH W/O ECP Right 10/25/2016    Procedure: OD EXTRACTION EXTRACAPSULAR CATARACT PHACO INTRAOCULAR LENS (IOL); Surgeon: Claribel Jaramillo MD;  [facility];  Service: Ophthalmology    MI XCAPSL CTRC RMVL INSJ IO LENS PROSTH W/O ECP Left 10/18/2016    Procedure: OS EXTRACTION EXTRACAPSULAR CATARACT PHACO INTRAOCULAR LENS (IOL); Surgeon: Claribel Jaramillo MD;  [facility];  Service: Ophthalmology    VASCULAR SURGERY        Family History:     Family History   Problem Relation Age of Onset    Diabetes Mother       Social History:     Social History     Socioeconomic History    Marital status:       Spouse name: None    Number of children: None    Years of education: None    Highest education level: None   Occupational History    None   Tobacco Use    Smoking status: Former Smoker    Smokeless tobacco: Never Used    Tobacco comment: quit 20 years ago   Substance and Sexual Activity    Alcohol use: No    Drug use: No    Sexual activity: None   Other Topics Concern    None   Social History Narrative    None     Social Determinants of Health     Financial Resource Strain: Not on file   Food Insecurity: Not on file   Transportation Needs: Not on file   Physical Activity: Not on file   Stress: Not on file   Social Connections: Not on file   Intimate Partner Violence: Not on file   Housing Stability: Not on file      Medications and Allergies:     Current Outpatient Medications   Medication Sig Dispense Refill    allopurinol (ZYLOPRIM) 300 mg tablet TAKE ONE TABLET BY MOUTH EVERY DAY 90 tablet 3    amLODIPine (NORVASC) 2 5 mg tablet Take 1 tablet (2 5 mg total) by mouth daily 90 tablet 1    atorvastatin (LIPITOR) 20 mg tablet TAKE ONE TABLET BY MOUTH EVERY DAY 90 tablet 3    clopidogrel (PLAVIX) 75 mg tablet TAKE ONE TABLET BY MOUTH EVERY DAY 90 tablet 3    metFORMIN (GLUCOPHAGE) 500 mg tablet TAKE ONE TABLET BY MOUTH EVERY DAY 90 tablet 3    metoprolol succinate (TOPROL-XL) 200 MG 24 hr tablet TAKE ONE TABLET BY MOUTH EVERY DAY 90 tablet 4    nitroglycerin (NITROSTAT) 0 4 mg SL tablet Place 0 4 mg under the tongue every 5 (five) minutes as needed for chest pain      nitroglycerin (NITROSTAT) 0 4 mg SL tablet Place 1 tablet under the tongue every 5 (five) minutes as needed      acetaminophen (TYLENOL) 325 mg tablet Take 650 mg by mouth every 6 (six) hours as needed for mild pain (Patient not taking: Reported on 11/29/2021 )      aspirin 81 mg chewable tablet Chew 1 tablet daily for 30 days 30 tablet 0     No current facility-administered medications for this visit       Allergies   Allergen Reactions    Medical Tape     Other Rash     Pt states she is allergic to tape adhesive      Immunizations:     Immunization History   Administered Date(s) Administered    INFLUENZA 09/30/2020    influenza, trivalent, adjuvanted 09/30/2020      Health Maintenance:         Topic Date Due    Breast Cancer Screening: Mammogram  06/11/2022         Topic Date Due    COVID-19 Vaccine (1) Never done    Pneumococcal Vaccine: 65+ Years (1 of 2 - PPSV23) Never done    DTaP,Tdap,and Td Vaccines (1 - Tdap) Never done    Influenza Vaccine (1) 09/01/2021      Medicare Health Risk Assessment:     /90 (BP Location: Right arm, Patient Position: Sitting, Cuff Size: Standard)   Pulse 68   Temp 98 °F (36 7 °C) (Temporal)   Ht 5' 3" (1 6 m)   Wt 85 7 kg (189 lb)   SpO2 97%   BMI 33 48 kg/m²      Last Medicare Wellness visit information reviewed, patient interviewed and updates made to the record today  Health Risk Assessment:   Patient rates overall health as good  Patient feels that their physical health rating is same  Patient is very satisfied with their life  Eyesight was rated as same  Hearing was rated as same  Patient feels that their emotional and mental health rating is slightly worse  Patients states they are never, rarely angry  Patient states they are sometimes unusually tired/fatigued  Pain experienced in the last 7 days has been none  Patient states that she has experienced no weight loss or gain in last 6 months  Depression Screening:   PHQ-2 Score: 0      Fall Risk Screening: In the past year, patient has experienced: no history of falling in past year      Urinary Incontinence Screening:   Patient has not leaked urine accidently in the last six months  Home Safety:  Patient does not have trouble with stairs inside or outside of their home  Patient has working smoke alarms and has working carbon monoxide detector  Home safety hazards include: none  Nutrition:   Current diet is Regular  Medications:   Patient is currently taking over-the-counter supplements  OTC medications include: see medication list  Patient is able to manage medications  Activities of Daily Living (ADLs)/Instrumental Activities of Daily Living (IADLs):   Walk and transfer into and out of bed and chair?: Yes  Dress and groom yourself?: Yes    Bathe or shower yourself?: Yes    Feed yourself? Yes  Do your laundry/housekeeping?: Yes  Manage your money, pay your bills and track your expenses?: Yes  Make your own meals?: Yes    Do your own shopping?: Yes    Previous Hospitalizations:   Any hospitalizations or ED visits within the last 12 months?: No      Advance Care Planning:   Living will: Yes    Durable POA for healthcare:  Yes    Advanced directive: Yes      PREVENTIVE SCREENINGS      Cardiovascular Screening:    General: Screening Not Indicated and History Lipid Disorder      Diabetes Screening:     General: Screening Not Indicated and History Diabetes      Breast Cancer Screening:     General: Screening Current      Cervical Cancer Screening:    General: Screening Not Indicated      Lung Cancer Screening:     General: Screening Not Indicated    Screening, Brief Intervention, and Referral to Treatment (SBIRT)    Screening  Typical number of drinks in a day: 0  Typical number of drinks in a week: 0  Interpretation: Low risk drinking behavior      Single Item Drug Screening:  How often have you used an illegal drug (including marijuana) or a prescription medication for non-medical reasons in the past year? never    Single Item Drug Screen Score: 0  Interpretation: Negative screen for possible drug use disorder      Claudette Hinojosa MD

## 2022-05-31 ENCOUNTER — HOSPITAL ENCOUNTER (OUTPATIENT)
Dept: NON INVASIVE DIAGNOSTICS | Facility: CLINIC | Age: 82
Discharge: HOME | End: 2022-05-31
Payer: COMMERCIAL

## 2022-05-31 DIAGNOSIS — I77.1 INNOMINATE ARTERY STENOSIS (HCC): ICD-10-CM

## 2022-05-31 DIAGNOSIS — I65.23 ASYMPTOMATIC BILATERAL CAROTID ARTERY STENOSIS: ICD-10-CM

## 2022-05-31 PROCEDURE — 93880 EXTRACRANIAL BILAT STUDY: CPT

## 2022-06-01 PROCEDURE — 93880 EXTRACRANIAL BILAT STUDY: CPT | Performed by: SURGERY

## 2022-06-02 ENCOUNTER — TRANSCRIBE ORDERS (OUTPATIENT)
Dept: VASCULAR SURGERY | Facility: CLINIC | Age: 82
End: 2022-06-02

## 2022-06-02 DIAGNOSIS — I65.23 ASYMPTOMATIC BILATERAL CAROTID ARTERY STENOSIS: Primary | ICD-10-CM

## 2022-10-14 ENCOUNTER — RA CDI HCC (OUTPATIENT)
Dept: OTHER | Facility: HOSPITAL | Age: 82
End: 2022-10-14

## 2022-10-14 NOTE — PROGRESS NOTES
[facility]  coding opportunities          Chart Reviewed number of suggestions sent to Provider: 2     Patients Insurance     Medicare Insurance: [employer] Medicare Advantage          : Type 2 diabetes mellitus with diabetic peripheral angiopathy without gangrene ([facility] ) [545298]  DM & PVD are presumed to have a causal-effect relationship unless documented as unrelated    I248: Demand ischemia of myocardium [facility]) [[id number]]  Found in active problem list - please review and assess if applicable for 9526

## 2023-01-20 ENCOUNTER — OFFICE VISIT (OUTPATIENT)
Dept: INTERNAL MEDICINE CLINIC | Facility: CLINIC | Age: 83
End: 2023-01-20

## 2023-01-20 VITALS
HEART RATE: 70 BPM | SYSTOLIC BLOOD PRESSURE: 130 MMHG | TEMPERATURE: 98.7 F | OXYGEN SATURATION: 96 % | BODY MASS INDEX: 32.25 KG/M2 | WEIGHT: 182 LBS | DIASTOLIC BLOOD PRESSURE: 60 MMHG | HEIGHT: 63 IN

## 2023-01-20 DIAGNOSIS — I77.1 STRICTURE OF ARTERY (HCC): ICD-10-CM

## 2023-01-20 DIAGNOSIS — I10 HYPERTENSION: ICD-10-CM

## 2023-01-20 DIAGNOSIS — I25.10 CAD (CORONARY ARTERY DISEASE): ICD-10-CM

## 2023-01-20 DIAGNOSIS — E11.22 TYPE 2 DIABETES MELLITUS WITH CHRONIC KIDNEY DISEASE, WITHOUT LONG-TERM CURRENT USE OF INSULIN, UNSPECIFIED CKD STAGE (HCC): ICD-10-CM

## 2023-01-20 DIAGNOSIS — E78.5 HYPERLIPIDEMIA: ICD-10-CM

## 2023-01-20 DIAGNOSIS — N18.32 STAGE 3B CHRONIC KIDNEY DISEASE (HCC): ICD-10-CM

## 2023-01-20 DIAGNOSIS — I24.8 DEMAND ISCHEMIA OF MYOCARDIUM (HCC): ICD-10-CM

## 2023-01-20 DIAGNOSIS — E11.9 DIABETES (HCC): Primary | ICD-10-CM

## 2023-01-20 DIAGNOSIS — I48.91 NEW ONSET A-FIB (HCC): ICD-10-CM

## 2023-01-20 LAB — SL AMB POCT HEMOGLOBIN AIC: 6.3 (ref ?–6.5)

## 2023-01-20 NOTE — PROGRESS NOTES
Assessment/Plan:    Follow up       Diagnoses and all orders for this visit:    Diabetes ([address] )  -     Hemoglobin A1C; Future  -     Comprehensive metabolic panel; Future    Hypertension    Hyperlipidemia  -     Lipid panel; Future    CAD (coronary artery disease)    Type 2 diabetes mellitus with chronic kidney disease, without long-term current use of insulin, unspecified CKD stage (HCC)    Stricture of artery (HCC)    New onset a-fib ([address] )    Demand ischemia of myocardium (HCC)    Stage 3b chronic kidney disease (HCC)          Subjective:      Patient ID: Lucy Kilgore is a 79 y o  female  HPI    The following portions of the patient's history were reviewed and updated as appropriate: allergies, current medications, past family history, past medical history, past social history, past surgical history, and problem list     Review of Systems   Constitutional: Negative  HENT: Negative for dental problem, drooling, ear discharge and ear pain  Eyes: Negative for discharge, redness and itching  Respiratory: Negative for apnea, cough and wheezing  Cardiovascular: Negative for chest pain and palpitations  Gastrointestinal: Negative for abdominal pain, blood in stool, diarrhea and vomiting  Endocrine: Negative for polydipsia, polyphagia and polyuria  Genitourinary: Negative for decreased urine volume, dysuria and frequency  Musculoskeletal: Negative for arthralgias, myalgias and neck stiffness  Skin: Negative for pallor and wound  Allergic/Immunologic: Negative for environmental allergies and food allergies  Neurological: Negative for facial asymmetry, light-headedness, numbness and headaches  Hematological: Negative for adenopathy  Does not bruise/bleed easily  Psychiatric/Behavioral: Negative for agitation, behavioral problems and confusion           Objective:      /60 (BP Location: Left arm, Patient Position: Sitting, Cuff Size: Large)   Pulse 70   Temp 98 7 °F (37 1 °C) (Temporal)   Ht 5' 3" (1 6 m)   Wt 82 6 kg (182 lb)   SpO2 96% Comment: RA  BMI 32 24 kg/m²          Physical Exam  Constitutional:       Appearance: Normal appearance  HENT:      Head: Normocephalic  Nose: Nose normal       Mouth/Throat:      Mouth: Mucous membranes are moist    Eyes:      Pupils: Pupils are equal, round, and reactive to light  Cardiovascular:      Rate and Rhythm: Regular rhythm  Heart sounds: Normal heart sounds  Pulmonary:      Breath sounds: Normal breath sounds  Abdominal:      Palpations: Abdomen is soft  Musculoskeletal:         General: No swelling  Cervical back: Neck supple  Skin:     General: Skin is warm  Neurological:      General: No focal deficit present  Mental Status: She is alert and oriented to person, place, and time  Psychiatric:         Mood and Affect: Mood normal        Diabetes   Well  Controlled  Hemoglobin  A1c  6 6%    Hypertension  Well   Controlled  With  Current  meds    Blood  Work  Ordered        Fup  4 months      KAI Brantely MD

## 2023-01-26 DIAGNOSIS — I10 ESSENTIAL HYPERTENSION: ICD-10-CM

## 2023-01-26 RX ORDER — AMLODIPINE BESYLATE 2.5 MG/1
2.5 TABLET ORAL DAILY
Qty: 90 TABLET | Refills: 1 | Status: SHIPPED | OUTPATIENT
Start: 2023-01-26

## 2023-01-30 DIAGNOSIS — I25.118 CORONARY ARTERY DISEASE OF NATIVE HEART WITH STABLE ANGINA PECTORIS, UNSPECIFIED VESSEL OR LESION TYPE (HCC): ICD-10-CM

## 2023-01-30 RX ORDER — CLOPIDOGREL BISULFATE 75 MG/1
75 TABLET ORAL DAILY
Qty: 90 TABLET | Refills: 3 | Status: SHIPPED | OUTPATIENT
Start: 2023-01-30

## 2023-03-29 DIAGNOSIS — M10.30 GOUT DUE TO RENAL IMPAIRMENT, UNSPECIFIED CHRONICITY, UNSPECIFIED SITE: ICD-10-CM

## 2023-03-29 DIAGNOSIS — I10 ESSENTIAL HYPERTENSION: ICD-10-CM

## 2023-03-29 RX ORDER — ATORVASTATIN CALCIUM 20 MG/1
20 TABLET, FILM COATED ORAL DAILY
Qty: 90 TABLET | Refills: 3 | Status: SHIPPED | OUTPATIENT
Start: 2023-03-29

## 2023-03-29 RX ORDER — ALLOPURINOL 300 MG/1
300 TABLET ORAL DAILY
Qty: 90 TABLET | Refills: 3 | Status: SHIPPED | OUTPATIENT
Start: 2023-03-29

## 2023-05-09 ENCOUNTER — RA CDI HCC (OUTPATIENT)
Dept: OTHER | Facility: HOSPITAL | Age: 83
End: 2023-05-09

## 2023-05-09 NOTE — PROGRESS NOTES
[facility] 75  coding opportunities          Chart Reviewed number of suggestions sent to Provider: 1     Patients Insurance     Medicare Insurance: [employer] Medicare Advantage          E11 51: Type 2 diabetes mellitus with diabetic peripheral angiopathy without gangrene ([facility] ) [[id number]]    DM & PVD are presumed to have a causal-effect relationship unless documented as unrelated

## 2023-05-22 ENCOUNTER — OFFICE VISIT (OUTPATIENT)
Dept: INTERNAL MEDICINE CLINIC | Facility: CLINIC | Age: 83
End: 2023-05-22

## 2023-05-22 VITALS
BODY MASS INDEX: 32.53 KG/M2 | OXYGEN SATURATION: 95 % | WEIGHT: 183.6 LBS | SYSTOLIC BLOOD PRESSURE: 172 MMHG | TEMPERATURE: 98.2 F | HEART RATE: 58 BPM | DIASTOLIC BLOOD PRESSURE: 70 MMHG | HEIGHT: 63 IN

## 2023-05-22 DIAGNOSIS — D64.9 ANEMIA: ICD-10-CM

## 2023-05-22 DIAGNOSIS — Z13.5 SCREENING FOR DIABETIC RETINOPATHY: ICD-10-CM

## 2023-05-22 DIAGNOSIS — E11.22 TYPE 2 DIABETES MELLITUS WITH CHRONIC KIDNEY DISEASE, WITHOUT LONG-TERM CURRENT USE OF INSULIN, UNSPECIFIED CKD STAGE (HCC): ICD-10-CM

## 2023-05-22 DIAGNOSIS — Z13.820 SCREENING FOR OSTEOPOROSIS: ICD-10-CM

## 2023-05-22 DIAGNOSIS — I10 HYPERTENSION: Primary | ICD-10-CM

## 2023-05-22 DIAGNOSIS — Z00.00 ENCOUNTER FOR MEDICARE ANNUAL WELLNESS EXAM: ICD-10-CM

## 2023-05-22 LAB
CREAT UR-MCNC: 112 MG/DL
MICROALBUMIN UR-MCNC: 75.8 MG/L (ref 0–20)
MICROALBUMIN/CREAT 24H UR: 68 MG/G CREATININE (ref 0–30)

## 2023-05-22 NOTE — PROGRESS NOTES
Assessment and Plan:     Problem List Items Addressed This Visit    None  Visit Diagnoses     Encounter for Medicare annual wellness exam    -  Primary    Screening for osteoporosis               Preventive health issues were discussed with patient, and age appropriate screening tests were ordered as noted in patient's After Visit Summary  Personalized health advice and appropriate referrals for health education or preventive services given if needed, as noted in patient's After Visit Summary       History of Present Illness:     Patient presents for a Medicare Wellness Visit    HPI   Patient Care Team:  Lizzie Cortez MD as PCP - General  April Mueller as PCP - [address] [place] [address] (RTE)  MD Warren Arreaga Doctor, MD Shantell Stinson, MD Janet Anne PA-C as Physician Assistant (Vascular Surgery)     Review of Systems:     Review of Systems     Problem List:     Patient Active Problem List   Diagnosis   • Cataract, left eye   • Cataract of right eye   • Asymptomatic bilateral carotid artery stenosis   • 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[facility])   • Demand ischemia of myocardium ([facility] )   • Hyperlipidemia   • Hypertension   • CKD (chronic kidney disease) stage 3, GFR 30-59 ml/min ([facility])   • Coronary artery disease   • Non-ST elevation myocardial infarction (NSTEMI), type 2   • New onset a-fib ([facility] )   • Intermittent claudication ([facility] )   • Type 2 diabetes mellitus with chronic kidney disease, without long-term current use of insulin, unspecified CKD stage [facility])      Past Medical and Surgical History:     Past Medical History:   Diagnosis Date   • Aortic valve stenosis    • Aorto-iliac disease ([facility] )    • Carotid stenosis    • Cataract of right eye 10/25/2016   • Cataract, left eye 10/18/2016   • Chronic systolic CHF (congestive heart failure) ([facility])    • CKD (chronic kidney disease) stage 3, GFR 30-59 ml/min ([facility])    • Coronary artery disease    • Diabetes mellitus ([facility] )     Type 2   • Dyslipidemia    • Gout    • Hyperlipidemia    • Hypertension    • 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   • Irritable bladder    • PAD (peripheral artery disease) (HCC)    • Renal artery stenosis (HCC)     Right     Past Surgical History:   Procedure Laterality Date   • ABDOMINAL AORTIC ANEURYSM REPAIR     • AORTIC VALVE REPLACEMENT     • CAROTID ENDARTERECTOMY Right 2017   • CATARACT EXTRACTION     •  SECTION     • CORONARY ARTERY BYPASS GRAFT     • CORONARY STENT PLACEMENT     • CYSTOSCOPY     • HYSTERECTOMY     • MAMMO (HISTORICAL)  06/10/2019   • TN TEAEC W/PATCH GRF CAROTID VERTB SUBCLAV NECK INC Right 2017    Procedure: CAROTID ENDARTERECTOMY WITH RETROGRADE AND INNOMINATE ARTERY STENTING;  Surgeon: Tevin Serna MD;  [facility];  Service: Vascular   • TN XCAPSL CTRC RMVL INSJ IO LENS PROSTH W/O ECP Right 10/25/2016    Procedure: OD EXTRACTION EXTRACAPSULAR CATARACT PHACO INTRAOCULAR LENS (IOL); Surgeon: Catalino Valle MD;  [facility];  Service: Ophthalmology   • TN XCAPSL CTRC RMVL INSJ IO LENS PROSTH W/O ECP Left 10/18/2016    Procedure: OS EXTRACTION EXTRACAPSULAR CATARACT PHACO INTRAOCULAR LENS (IOL); Surgeon: Catalino Valle MD;  [facility];  Service: Ophthalmology   • VASCULAR SURGERY        Family History:     Family History   Problem Relation Age of Onset   • Diabetes Mother       Social History:     Social History     Socioeconomic History   • Marital status:       Spouse name: None   • Number of children: None   • Years of education: None   • Highest education level: None   Occupational History   • None   Tobacco Use   • Smoking status: Former     Packs/day: 0 50     Years: 42 00     Pack years: 21 00     Types: Cigarettes     Start date: 5     Quit date:      Years since quittin 4   • Smokeless tobacco: Never   • Tobacco comments:     quit 20 years ago   Substance and Sexual Activity   • Alcohol use: No   • Drug use: No   • Sexual activity: None   Other Topics Concern   • None   Social History Narrative   • None     Social Determinants of Health     Financial Resource Strain: Not on file   Food Insecurity: Not on file   Transportation Needs: Not on file   Physical Activity: Not on file   Stress: Not on file   Social Connections: Not on file   Intimate Partner Violence: Not on file   Housing Stability: Not on file      Medications and Allergies:     Current Outpatient Medications   Medication Sig Dispense Refill   • acetaminophen (TYLENOL) 325 mg tablet Take 650 mg by mouth every 6 (six) hours as needed for mild pain     • allopurinol (ZYLOPRIM) 300 mg tablet Take 1 tablet (300 mg total) by mouth daily 90 tablet 3   • amLODIPine (NORVASC) 2 5 mg tablet Take 1 tablet (2 5 mg total) by mouth daily 90 tablet 1   • aspirin 81 mg chewable tablet Chew 1 tablet daily for 30 days 30 tablet 0   • atorvastatin (LIPITOR) 20 mg tablet Take 1 tablet (20 mg total) by mouth daily 90 tablet 3   • clopidogrel (PLAVIX) 75 mg tablet Take 1 tablet (75 mg total) by mouth daily 90 tablet 3   • metFORMIN (GLUCOPHAGE) 500 mg tablet Take 1 tablet (500 mg total) by mouth daily 90 tablet 3   • metoprolol succinate (TOPROL-XL) 200 MG 24 hr tablet TAKE ONE TABLET BY MOUTH EVERY DAY 90 tablet 4   • nitroglycerin (NITROSTAT) 0 4 mg SL tablet Place 0 4 mg under the tongue every 5 (five) minutes as needed for chest pain     • nitroglycerin (NITROSTAT) 0 4 mg SL tablet Place 1 tablet under the tongue every 5 (five) minutes as needed       No current facility-administered medications for this visit       Allergies   Allergen Reactions   • Medical Tape    • Other Rash     Pt states she is allergic to tape adhesive      Immunizations:     Immunization History   Administered Date(s) Administered   • INFLUENZA 09/30/2020   • influenza, trivalent, adjuvanted 09/30/2020      Health Maintenance:         Topic Date Due   • Breast Cancer Screening: Mammogram  06/13/2023         Topic Date Due   • COVID-19 Vaccine (1) Never done   • Pneumococcal Vaccine: 65+ Years (1 - PCV) Never done      Medicare Screening Tests and Risk Assessments:     Abilio Negrete is here for her Subsequent Wellness visit  Last Medicare Wellness visit information reviewed, patient interviewed and updates made to the record today  Health Risk Assessment:   Patient rates overall health as good  Patient feels that their physical health rating is much better  Patient is satisfied with their life  Eyesight was rated as same  Hearing was rated as same  Patient feels that their emotional and mental health rating is much better  Patients states they are sometimes angry  Patient states they are sometimes unusually tired/fatigued  Pain experienced in the last 7 days has been none  Patient states that she has experienced no weight loss or gain in last 6 months  Fall Risk Screening: In the past year, patient has experienced: no history of falling in past year      Urinary Incontinence Screening:   Patient has leaked urine accidently in the last six months  Home Safety:  Patient does not have trouble with stairs inside or outside of their home  Patient has working smoke alarms and has working carbon monoxide detector  Home safety hazards include: none  Nutrition:   Current diet is Regular  Medications:   Patient is not currently taking any over-the-counter supplements  Patient is able to manage medications  Activities of Daily Living (ADLs)/Instrumental Activities of Daily Living (IADLs):   Walk and transfer into and out of bed and chair?: Yes  Dress and groom yourself?: Yes    Bathe or shower yourself?: Yes    Feed yourself?  Yes  Do your laundry/housekeeping?: Yes  Manage your money, pay your bills and track your expenses?: Yes  Make your own meals?: Yes    Do your own shopping?: Yes    Previous Hospitalizations:   Any hospitalizations or ED visits within the last 12 months?: No Advance Care Planning:   Living will: Yes    Durable POA for healthcare: No    Advanced directive: Yes      PREVENTIVE SCREENINGS      Cardiovascular Screening:    General: Screening Not Indicated and History Lipid Disorder      Diabetes Screening:     General: Screening Not Indicated and History Diabetes      Breast Cancer Screening:     General: Screening Current      Cervical Cancer Screening:    General: Screening Not Indicated      Lung Cancer Screening:     General: Screening Not Indicated    Screening, Brief Intervention, and Referral to Treatment (SBIRT)    Screening  Typical number of drinks in a day: 0  Typical number of drinks in a week: 0  Interpretation: Low risk drinking behavior  Single Item Drug Screening:  How often have you used an illegal drug (including marijuana) or a prescription medication for non-medical reasons in the past year? never    Single Item Drug Screen Score: 0  Interpretation: Negative screen for possible drug use disorder    No results found  Physical Exam:     There were no vitals taken for this visit      Physical Exam     Mauricio Mirza MD

## 2023-07-11 DIAGNOSIS — I10 ESSENTIAL HYPERTENSION: ICD-10-CM

## 2023-07-11 RX ORDER — METOPROLOL SUCCINATE 200 MG/1
200 TABLET, EXTENDED RELEASE ORAL DAILY
Qty: 90 TABLET | Refills: 0 | Status: SHIPPED | OUTPATIENT
Start: 2023-07-11

## 2023-07-11 RX ORDER — AMLODIPINE BESYLATE 2.5 MG/1
2.5 TABLET ORAL DAILY
Qty: 90 TABLET | Refills: 0 | Status: SHIPPED | OUTPATIENT
Start: 2023-07-11 | End: 2023-10-09

## 2023-07-11 NOTE — TELEPHONE ENCOUNTER
Refill Request (90 day supply)     Amlodipine 2.5 mg   Metoprolol 200 mg     Pharmacy: [facility]     LA: 5/22/23  NA: 12/7/23

## 2023-10-03 ENCOUNTER — APPOINTMENT (EMERGENCY)
Dept: RADIOLOGY | Facility: HOSPITAL | Age: 83
End: 2023-10-03
Payer: COMMERCIAL

## 2023-10-03 ENCOUNTER — HOSPITAL ENCOUNTER (INPATIENT)
Facility: HOSPITAL | Age: 83
LOS: 1 days | Discharge: HOME HEALTH | End: 2023-10-05
Attending: EMERGENCY MEDICINE | Admitting: INTERNAL MEDICINE
Payer: COMMERCIAL

## 2023-10-03 DIAGNOSIS — E78.5 HYPERLIPIDEMIA, UNSPECIFIED HYPERLIPIDEMIA TYPE: ICD-10-CM

## 2023-10-03 DIAGNOSIS — I50.9 CHF EXACERBATION (HCC): Primary | ICD-10-CM

## 2023-10-03 DIAGNOSIS — R79.89 ELEVATED TROPONIN: ICD-10-CM

## 2023-10-03 DIAGNOSIS — J96.90 RESPIRATORY FAILURE (HCC): ICD-10-CM

## 2023-10-03 DIAGNOSIS — I25.10 CORONARY ARTERY DISEASE, UNSPECIFIED VESSEL OR LESION TYPE, UNSPECIFIED WHETHER ANGINA PRESENT, UNSPECIFIED WHETHER NATIVE OR TRANSPLANTED HEART: ICD-10-CM

## 2023-10-03 DIAGNOSIS — I24.89 DEMAND ISCHEMIA OF MYOCARDIUM: ICD-10-CM

## 2023-10-03 PROBLEM — R06.89 RESPIRATORY INSUFFICIENCY: Status: ACTIVE | Noted: 2023-10-03

## 2023-10-03 LAB
2HR DELTA HS TROPONIN: 1455 NG/L
4HR DELTA HS TROPONIN: 2475 NG/L
ALBUMIN SERPL BCP-MCNC: 3.9 G/DL (ref 3.5–5)
ALP SERPL-CCNC: 92 U/L (ref 34–104)
ALT SERPL W P-5'-P-CCNC: 9 U/L (ref 7–52)
ANION GAP SERPL CALCULATED.3IONS-SCNC: 12 MMOL/L
AST SERPL W P-5'-P-CCNC: 14 U/L (ref 13–39)
BASOPHILS # BLD MANUAL: 0 THOUSAND/UL (ref 0–0.1)
BASOPHILS NFR MAR MANUAL: 0 % (ref 0–1)
BILIRUB SERPL-MCNC: 0.76 MG/DL (ref 0.2–1)
BNP SERPL-MCNC: 951 PG/ML (ref 0–100)
BUN SERPL-MCNC: 18 MG/DL (ref 5–25)
CALCIUM SERPL-MCNC: 9.2 MG/DL (ref 8.4–10.2)
CARDIAC TROPONIN I PNL SERPL HS: 156 NG/L
CARDIAC TROPONIN I PNL SERPL HS: 1611 NG/L
CARDIAC TROPONIN I PNL SERPL HS: 2631 NG/L
CHLORIDE SERPL-SCNC: 103 MMOL/L (ref 96–108)
CHOLEST SERPL-MCNC: 167 MG/DL
CO2 SERPL-SCNC: 22 MMOL/L (ref 21–32)
CREAT SERPL-MCNC: 1.02 MG/DL (ref 0.6–1.3)
EOSINOPHIL # BLD MANUAL: 1 THOUSAND/UL (ref 0–0.4)
EOSINOPHIL NFR BLD MANUAL: 5 % (ref 0–6)
ERYTHROCYTE [DISTWIDTH] IN BLOOD BY AUTOMATED COUNT: 14.6 % (ref 11.6–15.1)
FLUAV RNA RESP QL NAA+PROBE: NEGATIVE
FLUBV RNA RESP QL NAA+PROBE: NEGATIVE
GFR SERPL CREATININE-BSD FRML MDRD: 50 ML/MIN/1.73SQ M
GLUCOSE SERPL-MCNC: 127 MG/DL (ref 65–140)
GLUCOSE SERPL-MCNC: 162 MG/DL (ref 65–140)
GLUCOSE SERPL-MCNC: 166 MG/DL (ref 65–140)
GLUCOSE SERPL-MCNC: 257 MG/DL (ref 65–140)
HCT VFR BLD AUTO: 38.5 % (ref 34.8–46.1)
HDLC SERPL-MCNC: 43 MG/DL
HGB BLD-MCNC: 12.1 G/DL (ref 11.5–15.4)
LDLC SERPL CALC-MCNC: 97 MG/DL (ref 0–100)
LYMPHOCYTES # BLD AUTO: 17 % (ref 14–44)
LYMPHOCYTES # BLD AUTO: 4.2 THOUSAND/UL (ref 0.6–4.47)
MAGNESIUM SERPL-MCNC: 1.6 MG/DL (ref 1.9–2.7)
MCH RBC QN AUTO: 29.2 PG (ref 26.8–34.3)
MCHC RBC AUTO-ENTMCNC: 31.4 G/DL (ref 31.4–37.4)
MCV RBC AUTO: 93 FL (ref 82–98)
MONOCYTES # BLD AUTO: 1 THOUSAND/UL (ref 0–1.22)
MONOCYTES NFR BLD: 5 % (ref 4–12)
NEUTROPHILS # BLD MANUAL: 13.79 THOUSAND/UL (ref 1.85–7.62)
NEUTS BAND NFR BLD MANUAL: 5 % (ref 0–8)
NEUTS SEG NFR BLD AUTO: 64 % (ref 43–75)
PLATELET # BLD AUTO: 315 THOUSANDS/UL (ref 149–390)
PLATELET BLD QL SMEAR: ADEQUATE
PMV BLD AUTO: 10.1 FL (ref 8.9–12.7)
POTASSIUM SERPL-SCNC: 3.7 MMOL/L (ref 3.5–5.3)
PROT SERPL-MCNC: 7 G/DL (ref 6.4–8.4)
RBC # BLD AUTO: 4.14 MILLION/UL (ref 3.81–5.12)
RSV RNA RESP QL NAA+PROBE: NEGATIVE
SARS-COV-2 RNA RESP QL NAA+PROBE: NEGATIVE
SODIUM SERPL-SCNC: 137 MMOL/L (ref 135–147)
TRIGL SERPL-MCNC: 133 MG/DL
TSH SERPL DL<=0.05 MIU/L-ACNC: 2.62 UIU/ML (ref 0.45–4.5)
VARIANT LYMPHS # BLD AUTO: 4 %
WBC # BLD AUTO: 19.98 THOUSAND/UL (ref 4.31–10.16)

## 2023-10-03 PROCEDURE — 83880 ASSAY OF NATRIURETIC PEPTIDE: CPT

## 2023-10-03 PROCEDURE — 83735 ASSAY OF MAGNESIUM: CPT

## 2023-10-03 PROCEDURE — 99223 1ST HOSP IP/OBS HIGH 75: CPT | Performed by: INTERNAL MEDICINE

## 2023-10-03 PROCEDURE — 96374 THER/PROPH/DIAG INJ IV PUSH: CPT

## 2023-10-03 PROCEDURE — 94760 N-INVAS EAR/PLS OXIMETRY 1: CPT

## 2023-10-03 PROCEDURE — 36415 COLL VENOUS BLD VENIPUNCTURE: CPT

## 2023-10-03 PROCEDURE — 82948 REAGENT STRIP/BLOOD GLUCOSE: CPT

## 2023-10-03 PROCEDURE — 93005 ELECTROCARDIOGRAM TRACING: CPT

## 2023-10-03 PROCEDURE — 84443 ASSAY THYROID STIM HORMONE: CPT

## 2023-10-03 PROCEDURE — 80061 LIPID PANEL: CPT

## 2023-10-03 PROCEDURE — 0241U HB NFCT DS VIR RESP RNA 4 TRGT: CPT

## 2023-10-03 PROCEDURE — 85027 COMPLETE CBC AUTOMATED: CPT

## 2023-10-03 PROCEDURE — 99291 CRITICAL CARE FIRST HOUR: CPT | Performed by: EMERGENCY MEDICINE

## 2023-10-03 PROCEDURE — 94002 VENT MGMT INPAT INIT DAY: CPT

## 2023-10-03 PROCEDURE — 84484 ASSAY OF TROPONIN QUANT: CPT

## 2023-10-03 PROCEDURE — 99285 EMERGENCY DEPT VISIT HI MDM: CPT

## 2023-10-03 PROCEDURE — 83036 HEMOGLOBIN GLYCOSYLATED A1C: CPT

## 2023-10-03 PROCEDURE — 80053 COMPREHEN METABOLIC PANEL: CPT

## 2023-10-03 PROCEDURE — 85007 BL SMEAR W/DIFF WBC COUNT: CPT

## 2023-10-03 PROCEDURE — 71045 X-RAY EXAM CHEST 1 VIEW: CPT

## 2023-10-03 RX ORDER — METOPROLOL SUCCINATE 100 MG/1
200 TABLET, EXTENDED RELEASE ORAL DAILY
Status: DISCONTINUED | OUTPATIENT
Start: 2023-10-03 | End: 2023-10-03

## 2023-10-03 RX ORDER — ASPIRIN 81 MG/1
324 TABLET, CHEWABLE ORAL ONCE
Status: COMPLETED | OUTPATIENT
Start: 2023-10-03 | End: 2023-10-03

## 2023-10-03 RX ORDER — ATORVASTATIN CALCIUM 20 MG/1
20 TABLET, FILM COATED ORAL DAILY
Status: DISCONTINUED | OUTPATIENT
Start: 2023-10-03 | End: 2023-10-04

## 2023-10-03 RX ORDER — FUROSEMIDE 10 MG/ML
40 INJECTION INTRAMUSCULAR; INTRAVENOUS
Status: DISCONTINUED | OUTPATIENT
Start: 2023-10-03 | End: 2023-10-05

## 2023-10-03 RX ORDER — AMLODIPINE BESYLATE 2.5 MG/1
2.5 TABLET ORAL DAILY
Status: DISCONTINUED | OUTPATIENT
Start: 2023-10-03 | End: 2023-10-05 | Stop reason: HOSPADM

## 2023-10-03 RX ORDER — FUROSEMIDE 10 MG/ML
40 INJECTION INTRAMUSCULAR; INTRAVENOUS ONCE
Status: COMPLETED | OUTPATIENT
Start: 2023-10-03 | End: 2023-10-03

## 2023-10-03 RX ORDER — NITROGLYCERIN 0.4 MG/1
0.4 TABLET SUBLINGUAL
Status: DISCONTINUED | OUTPATIENT
Start: 2023-10-03 | End: 2023-10-05 | Stop reason: HOSPADM

## 2023-10-03 RX ORDER — CLOPIDOGREL BISULFATE 75 MG/1
75 TABLET ORAL DAILY
Status: DISCONTINUED | OUTPATIENT
Start: 2023-10-03 | End: 2023-10-05 | Stop reason: HOSPADM

## 2023-10-03 RX ORDER — MAGNESIUM SULFATE HEPTAHYDRATE 40 MG/ML
2 INJECTION, SOLUTION INTRAVENOUS ONCE
Status: COMPLETED | OUTPATIENT
Start: 2023-10-03 | End: 2023-10-04

## 2023-10-03 RX ORDER — INSULIN LISPRO 100 [IU]/ML
2-12 INJECTION, SOLUTION INTRAVENOUS; SUBCUTANEOUS
Status: DISCONTINUED | OUTPATIENT
Start: 2023-10-03 | End: 2023-10-05 | Stop reason: HOSPADM

## 2023-10-03 RX ORDER — ASPIRIN 81 MG/1
81 TABLET, CHEWABLE ORAL DAILY
Status: DISCONTINUED | OUTPATIENT
Start: 2023-10-04 | End: 2023-10-05 | Stop reason: HOSPADM

## 2023-10-03 RX ORDER — NITROGLYCERIN 20 MG/100ML
5-200 INJECTION INTRAVENOUS
Status: DISCONTINUED | OUTPATIENT
Start: 2023-10-03 | End: 2023-10-03

## 2023-10-03 RX ORDER — ENOXAPARIN SODIUM 100 MG/ML
40 INJECTION SUBCUTANEOUS DAILY
Status: DISCONTINUED | OUTPATIENT
Start: 2023-10-03 | End: 2023-10-05 | Stop reason: HOSPADM

## 2023-10-03 RX ORDER — NITROGLYCERIN 20 MG/100ML
INJECTION INTRAVENOUS
Status: DISPENSED
Start: 2023-10-03 | End: 2023-10-03

## 2023-10-03 RX ORDER — METOPROLOL SUCCINATE 100 MG/1
200 TABLET, EXTENDED RELEASE ORAL DAILY
Status: DISCONTINUED | OUTPATIENT
Start: 2023-10-04 | End: 2023-10-05 | Stop reason: HOSPADM

## 2023-10-03 RX ADMIN — ASPIRIN 81 MG CHEWABLE TABLET 324 MG: 81 TABLET CHEWABLE at 10:55

## 2023-10-03 RX ADMIN — FUROSEMIDE 40 MG: 10 INJECTION, SOLUTION INTRAMUSCULAR; INTRAVENOUS at 09:17

## 2023-10-03 RX ADMIN — ENOXAPARIN SODIUM 40 MG: 40 INJECTION SUBCUTANEOUS at 16:52

## 2023-10-03 RX ADMIN — FUROSEMIDE 40 MG: 10 INJECTION, SOLUTION INTRAMUSCULAR; INTRAVENOUS at 16:52

## 2023-10-03 RX ADMIN — MAGNESIUM SULFATE HEPTAHYDRATE 2 G: 40 INJECTION, SOLUTION INTRAVENOUS at 19:26

## 2023-10-03 NOTE — ASSESSMENT & PLAN NOTE
Patient presenting in likely CHF exacerbation. Previously weaned off BIPAP to NC. Complaining yesterday of increased yellow sputum associated with cough. COVID/Flu panel negative. No recent sick contacts or illness. Elevated WBC 19.9k, no fever or other symptoms to suggest infection    Chest x-ray: Mixed interstitial and airspace opacities are worse than the prior study as well as pulmonary vascular congestion. No pneumothorax. Haziness at the bases may represent trace effusions.   OFF OXYGEN     Plan:  -Continue to trend WBC  -Monitor for any signs of infection

## 2023-10-03 NOTE — ASSESSMENT & PLAN NOTE
History of CAD s/p CABG '96, PCI/stent '12, 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 Currently taking 81 mg aspirin QD and 75 mg plavix QD at home. Prior to admission, patient reporting chest pain 5/10 in severity that is now resolved.  S/p one dose of 324 mg aspirin in the ED    Initial troponin 156, 2hr troponin 1611, 4hr troponin 2631  NSTEMI VS CHF      Plan:  -Continue aspirin, plavix, lipitor  -PRN nitroglycerin sl 0.4 mg  -Continue with tele  -Consult Cardiology for elevated troponins, appreciate recs

## 2023-10-03 NOTE — H&P
INTERNAL MEDICINE RESIDENCY ADMISSION H&P     Name: Camden Marcelino   Age & Sex: 79 y.o. female   MRN: [id number]  Unit/Bed#: CW2 210-01   Encounter: [id number]  Primary Care Provider: Tong Hicks MD    Code Status: Level 1 - Full Code  Admission Status: INPATIENT   Disposition: Patient requires Med/Surg    Admit to team: SOD Team B     ASSESSMENT/PLAN     Principal Problem:    CHF exacerbation ([address])  Active Problems:    Respiratory insufficiency    Hyperlipidemia    Hypertension    CKD (chronic kidney disease) stage 3, GFR 30-59 ml/min ([facility])    Coronary artery disease    Type 2 diabetes mellitus with chronic kidney disease, without long-term current use of insulin, unspecified CKD stage ([facility])    Elevated troponin      * CHF exacerbation ([address])  Assessment & Plan  Wt Readings from Last 3 Encounters:   05/22/23 83.3 kg (183 lb 9.6 oz)   01/20/23 82.6 kg (182 lb)   07/05/22 85.7 kg (189 lb)     Patient arriving with respiratory distress, eventually resolving with a short period of BIPAP now weaned to 4 L/min NC    Labs notable for elevated , no baseline to compare to. Elevated WBC 19.9k    At her baseline, she is able to walk up two flights of stairs without becoming short of breath. History of CAD with most recent Echo in 2017, EF 65% and consistent with a pseudonormal left ventricular filling pattern, with concomitant abnormal relaxation and increased filling pressure (grade 2 diastolic dysfunction). The left atrium was mildly to moderately dilated. Her bioprosthesis aortic valve exhibited normal function. There was moderate tricuspid valve regurgitation. Estimated peak PA pressure was 55 mmHg. The findings suggest moderate pulmonary hypertension.     Plan:  -f/u with repeat Echo  -Consult Cardiology for CHF exacerbation and elevated troponins, appreciate recs  -Lasix as needed for diuresis  -prn nitroglycerin  -f/u with TSH  -Cardiac diet        Respiratory insufficiency  Assessment & Plan  Patient presenting in likely CHF exacerbation. Previously weaned off BIPAP to NC. Complaining yesterday of increased yellow sputum associated with cough. COVID/Flu panel negative. No recent sick contacts or illness. Elevated WBC 19.9k, no fever or other symptoms to suggest infection    Chest x-ray: Mixed interstitial and airspace opacities are worse than the prior study as well as pulmonary vascular congestion. No pneumothorax. Haziness at the bases may represent trace effusions. Plan:  -Continue to trend WBC  -Monitor for any signs of infection      Elevated troponin  Assessment & Plan  This admission: Initial troponin 156, 2hr troponin 1611, 4hr troponin 2631 in the setting of CHF exacerbation  S/p one dose of aspirin 324 mg      Plan:  Continue aspirin, plavix, lipitor  -PRN nitroglycerin sl 0.4 mg  -Consult Cardiology for elevated troponins, appreciate recs      Type 2 diabetes mellitus with chronic kidney disease, without long-term current use of insulin, unspecified CKD stage [facility])  Assessment & Plan  Lab Results   Component Value Date    HGBA1C 6.3 01/20/2023       Recent Labs     10/03/23  1303   POCGLU 166*       Blood Sugar Average: Last 72 hrs:  (P) 166     History of T2DM, at home takes metformin 500 mg QD    Plan:  -f/u with repeat hemoglobin A1c  -SSI, optimize glycemic control       Coronary artery disease  Assessment & Plan  History of CAD s/p CABG '96, PCI/stent '12, 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 Currently taking 81 mg aspirin QD and 75 mg plavix QD at home. Prior to admission, patient reporting chest pain 5/10 in severity that is now resolved.  S/p one dose of 324 mg aspirin in the ED    Initial troponin 156, 2hr troponin 1611, 4hr troponin 2631    Plan:  -Continue aspirin, plavix, lipitor  -PRN nitroglycerin sl 0.4 mg  -Continue with tele  -Consult Cardiology for elevated troponins, appreciate recs      CKD (chronic kidney disease) stage 3, GFR 30-59 ml/min [facility])  Assessment & Plan  Lab Results   Component Value Date    EGFR 50 10/03/2023    EGFR 52.0 02/01/2017    EGFR 47.7 01/30/2017    CREATININE 1.02 10/03/2023    CREATININE 1.03 02/01/2017    CREATININE 1.11 01/30/2017     Known history of CKD 3 with T2DM, baseline creatinine around 1.0-1.1    Plan:  -Continue to monitor renal function with BMPs while diuresing       Hypertension  Assessment & Plan  History of hypertension, home meds include amlodipine 2.5 QD and toprol  mg QD    Plan:  -Continue home meds amlodipine 2.5 mg QD and toprol  mg QD    Hyperlipidemia  Assessment & Plan  History of hyperlipidemia and CAD. Last lipid panel in 2020 remarkable for slightly elevated triglycerides. Currently takes 20 mg lipitor QD at home    Plan:  -Continue home lipitor 20 mg QD      VTE Pharmacologic Prophylaxis: Enoxaparin (Lovenox)  VTE Mechanical Prophylaxis: sequential compression device    CHIEF COMPLAINT     Chief Complaint   Patient presents with   • Respiratory Distress     Pt brought in via EMS for respiratory distress. Pt alert and oriented, 91% en route on CPAP - states symptoms started yesterday. +CP, non-radiating, hx of CHF. [place] Bull     Mrs. Benji Burciaga is an 78 yo F PMH CAD with CABG in 1996 with stents placed after, HTN, hyperlipidemia, CHF, T2DM, bilateral carotid artery stenosis s/p R CEA and R innominate stent, CKD 3, via EMS in respiratory distress. On arrival, she was on CPAP with 88% O2 saturation. She was switched to BIPAP and began saturating 100%, now currently with sats in the high 90s on 4 L NC. She was given 40 mg IV lasix and notes significant improvement in her breathing with the oxygen therapy and diuretic. Labs notable for leukocytosis on CBC. Her CMP was unremarkable, kidney function at her baseline. BNP was elevated at 951 and initial troponin was 156. Prior to coming in, patient noted chest pain (5/10 in severity with 10 being the worst pain of her life and 1 being no pain).  The pain has since resolved and she is at 0/10. Her chest pain was non-radiating in nature and she denies it being sharp. She reports compliance with her home medications and has not missed any doses in the preceding days. She denies significant orthopnea, however she does not lie flat but rather sleeps propped up on her side already. At her baseline, she is able to go up two flights of stairs and walk around without any shortness of breath. The past two days, she experienced significant dyspnea. She reported that she has not been eating healthy in the past week. She notes eating saltier foods on top of her usual diet. Yesterday, she had yellowish-sputum associated with a cough that was new but denies any recent illness, sick contacts, fever, or chills. Patient admitted under SOD B service/ pt is level 1 full code. REVIEW OF SYSTEMS     Review of Systems   Constitutional: Positive for activity change. Negative for chills and fever. HENT: Negative for congestion. Respiratory: Positive for cough and shortness of breath. Negative for wheezing. SOB improved with O2 therapy   Cardiovascular: Negative for chest pain, palpitations and leg swelling. Gastrointestinal: Negative for abdominal distention, abdominal pain, constipation, diarrhea and nausea. Endocrine: Negative for polyuria. Neurological: Negative for dizziness and light-headedness. Psychiatric/Behavioral: The patient is not nervous/anxious. All other systems reviewed and are negative. OBJECTIVE     Vitals:    10/03/23 1230 10/03/23 1257 10/03/23 1300 10/03/23 1301   BP: 140/64 (!) 148/108     BP Location:       Pulse: 74 72 70 65   Resp: 22 20     Temp:    98.6 °F (37 °C)   TempSrc:       SpO2: 98% 96%  96%      Temperature:   Temp (24hrs), Av.4 °F (36.9 °C), Min:98.2 °F (36.8 °C), Max:98.6 °F (37 °C)    Temperature: 98.6 °F (37 °C)  Intake & Output:  I/O     None        Weights:         There is no height or weight on file to calculate BMI.  Weight (last 2 days)     None        Physical Exam  Constitutional:       General: She is not in acute distress. Appearance: Normal appearance. She is not ill-appearing or diaphoretic. HENT:      Head: Normocephalic. Eyes:      Conjunctiva/sclera: Conjunctivae normal.   Cardiovascular:      Rate and Rhythm: Normal rate and regular rhythm. Pulmonary:      Effort: Pulmonary effort is normal. No respiratory distress. Breath sounds: No rales. Abdominal:      General: There is no distension. Palpations: Abdomen is soft. Tenderness: There is no abdominal tenderness. There is no guarding. Musculoskeletal:      Right lower leg: No edema. Left lower leg: No edema. Skin:     General: Skin is warm and dry. Neurological:      General: No focal deficit present. Mental Status: She is alert. Psychiatric:         Mood and Affect: Mood normal.         Thought Content:  Thought content normal.       PAST MEDICAL HISTORY     Past Medical History:   Diagnosis Date   • Aortic valve stenosis    • Aorto-iliac disease ([address])    • Carotid stenosis    • Cataract of right eye 10/25/2016   • Cataract, left eye 10/18/2016   • Chronic systolic CHF (congestive heart failure) ([facility])    • CKD (chronic kidney disease) stage 3, GFR 30-59 ml/min ([facility])    • Coronary artery disease    • Diabetes mellitus ([facility])     Type 2   • Dyslipidemia    • Gout    • Hyperlipidemia    • Hypertension    • 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   • Irritable bladder    • PAD (peripheral artery disease) ([facility])    • Renal artery stenosis ([address])     Right     PAST SURGICAL HISTORY     Past Surgical History:   Procedure Laterality Date   • ABDOMINAL AORTIC ANEURYSM REPAIR     • AORTIC VALVE REPLACEMENT     • CAROTID ENDARTERECTOMY Right 2017   • CATARACT EXTRACTION     •  SECTION     • CORONARY ARTERY BYPASS GRAFT     • CORONARY STENT PLACEMENT     • CYSTOSCOPY     • HYSTERECTOMY     • MAMMO (HISTORICAL)  06/10/2019 • OK TEAEC W/PATCH GRF CAROTID VERTB SUBCLAV NECK INC Right 2017    Procedure: CAROTID ENDARTERECTOMY WITH RETROGRADE AND INNOMINATE ARTERY STENTING;  Surgeon: Alma Soriano MD;  Location: [facility];  Service: Vascular   • OK XCAPSL CTRC RMVL INSJ IO LENS PROSTH W/O ECP Right 10/25/2016    Procedure: OD EXTRACTION EXTRACAPSULAR CATARACT PHACO INTRAOCULAR LENS (IOL); Surgeon: Wily Alfred MD;  Location: [facility];  Service: Ophthalmology   • OK XCAPSL CTRC RMVL INSJ IO LENS PROSTH W/O ECP Left 10/18/2016    Procedure: OS EXTRACTION EXTRACAPSULAR CATARACT PHACO INTRAOCULAR LENS (IOL); Surgeon: Wily Alfred MD;  Location: [facility];  Service: Ophthalmology   • VASCULAR SURGERY       SOCIAL & FAMILY HISTORY     Social History     Substance and Sexual Activity   Alcohol Use No     Substance and Sexual Activity   Alcohol Use No        Substance and Sexual Activity   Drug Use No     Social History     Tobacco Use   Smoking Status Former   • Packs/day: 0.50   • Years: 42.00   • Total pack years: 21.00   • Types: Cigarettes   • Start date: 5   • Quit date: 18   • Years since quittin.7   Smokeless Tobacco Never   Tobacco Comments    quit 20 years ago     Family History   Problem Relation Age of Onset   • Diabetes Mother      LABORATORY DATA     Labs: I have personally reviewed pertinent reports.     Results from last 7 days   Lab Units 10/03/23  0801   WBC Thousand/uL 19.98*   HEMOGLOBIN g/dL 12.1   HEMATOCRIT % 38.5   PLATELETS Thousands/uL 315   MONO PCT % 5   EOS PCT % 5      Results from last 7 days   Lab Units 10/03/23  0801   POTASSIUM mmol/L 3.7   CHLORIDE mmol/L 103   CO2 mmol/L 22   BUN mg/dL 18   CREATININE mg/dL 1.02   CALCIUM mg/dL 9.2   ALK PHOS U/L 92   ALT U/L 9   AST U/L 14     Results from last 7 days   Lab Units 10/03/23  0801   MAGNESIUM mg/dL 1.6*                      Micro:  No results found for: "BLOODCX", "URINECX", "WOUNDCULT", "SPUTUMCULTUR"  IMAGING & DIAGNOSTIC TESTS Imaging: I have personally reviewed pertinent reports. XR chest 1 view portable    Result Date: 10/3/2023  Impression: Evidence of pulmonary edema. Pneumonia is not excluded Pulmonary edema was noted on the ER preliminary results. Workstation performed: [id number]     EKG, Pathology, and Other Studies: I have personally reviewed pertinent reports. ALLERGIES     Allergies   Allergen Reactions   • Medical Tape    • Other Rash     Pt states she is allergic to tape adhesive     MEDICATIONS PRIOR TO ARRIVAL     Prior to Admission medications    Medication Sig Start Date End Date Taking?  Authorizing Provider   acetaminophen (TYLENOL) 325 mg tablet Take 650 mg by mouth every 6 (six) hours as needed for mild pain    Historical Provider, MD   allopurinol (ZYLOPRIM) 300 mg tablet Take 1 tablet (300 mg total) by mouth daily 3/29/23   Neva Anaya MD   amLODIPine (NORVASC) 2.5 mg tablet Take 1 tablet (2.5 mg total) by mouth daily 7/11/23 10/9/23  Neva Anaya MD   aspirin 81 mg chewable tablet Chew 1 tablet daily for 30 days 2/1/17 5/22/23  Susan Srivastava MD   atorvastatin (LIPITOR) 20 mg tablet Take 1 tablet (20 mg total) by mouth daily 3/29/23   Neva Anaya MD   clopidogrel (PLAVIX) 75 mg tablet Take 1 tablet (75 mg total) by mouth daily 1/30/23   Neva Anaya MD   metFORMIN (GLUCOPHAGE) 500 mg tablet Take 1 tablet (500 mg total) by mouth daily 4/14/23   Neva Anaya MD   metoprolol succinate (TOPROL-XL) 200 MG 24 hr tablet Take 1 tablet (200 mg total) by mouth daily 7/11/23   Neva Anaya MD   nitroglycerin (NITROSTAT) 0.4 mg SL tablet Place 0.4 mg under the tongue every 5 (five) minutes as needed for chest pain    Warner Vizcarra MD   nitroglycerin (NITROSTAT) 0.4 mg SL tablet Place 1 tablet under the tongue every 5 (five) minutes as needed 11/4/11   Warner Vizcarra MD     MEDICATIONS ADMINISTERED IN LAST 24 HOURS     Medication Administration - last 24 hours from 10/02/2023 1348 to 10/03/2023 1348       Date/Time Order Dose Route Action Action by     10/03/2023 0752 EDT nitroGLYcerin (TRIDIL) 50 mg in 250 mL infusion (premix) 0 mcg/min Intravenous Hold Addis Gonzalez RN     10/03/2023 0830 EDT sodium chloride 0.9 % bolus 500 mL 0 mL Intravenous Hold Addis Gonzalez RN     10/03/2023 1494 EDT furosemide (LASIX) injection 40 mg 40 mg Intravenous Given Milan Do, RN     10/03/2023 1055 EDT aspirin chewable tablet 324 mg 324 mg Oral Given Ivone Mague, RN     10/03/2023 1301 EDT amLODIPine (NORVASC) tablet 2.5 mg 2.5 mg Oral Not Given Carlton Santa Cruz, RN     10/03/2023 1300 EDT atorvastatin (LIPITOR) tablet 20 mg 20 mg Oral Not Given Carlton Santa Cruz, RN     10/03/2023 1301 EDT clopidogrel (PLAVIX) tablet 75 mg 75 mg Oral Not Given Carlton Santa Cruz, RN        CURRENT MEDICATIONS     Current Facility-Administered Medications   Medication Dose Route Frequency Provider Last Rate   • amLODIPine  2.5 mg Oral Daily Het D Mcadams, DO     • [START ON 10/4/2023] aspirin  81 mg Oral Daily Het D Mcadams, DO     • atorvastatin  20 mg Oral Daily Het D Mcadams, DO     • clopidogrel  75 mg Oral Daily Het D Mcadams, DO     • enoxaparin  40 mg Subcutaneous Daily Het D Mcadams, DO     • insulin lispro  2-12 Units Subcutaneous TID AC Het D Mcadams, DO     • [START ON 10/4/2023] metoprolol succinate  200 mg Oral Daily Het D Mcadams, DO     • nitroglycerin  0.4 mg Sublingual Q5 Min PRN Het D Mcadams, DO          nitroglycerin, 0.4 mg, Q5 Min PRN        Admission Time  I spent 30 minutes admitting the patient. This involved direct patient contact where I performed a full history and physical, reviewing previous records, and reviewing laboratory and other diagnostic studies. Portions of the record may have been created with voice recognition software. Occasional wrong word or "sound a like" substitutions may have occurred due to the inherent limitations of voice recognition software.   Read the chart carefully and recognize, using context, where substitutions have occurred.    ==  Dorian Munoz  Internal Medicine Residency PGY-2

## 2023-10-03 NOTE — ASSESSMENT & PLAN NOTE
Lab Results   Component Value Date    EGFR 39 10/05/2023    EGFR 44 10/04/2023    EGFR 50 10/03/2023    CREATININE 1.25 10/05/2023    CREATININE 1.14 10/04/2023    CREATININE 1.02 10/03/2023     Known history of CKD 3 with T2DM, baseline creatinine around 1.0-1.1  Upward trending Cr likely due to diuretics    Plan:  -Continue to monitor renal function with BMPs while diuresing

## 2023-10-03 NOTE — ASSESSMENT & PLAN NOTE
History of hypertension, home meds include amlodipine 2.5 QD and toprol  mg QD    Plan:  -Continue home meds amlodipine 2.5 mg QD and toprol  mg QD

## 2023-10-03 NOTE — ASSESSMENT & PLAN NOTE
Lab Results   Component Value Date    HGBA1C 6.7 (H) 10/03/2023       Recent Labs     10/04/23  1649 10/04/23  2056 10/05/23  0708 10/05/23  1116   POCGLU 126 163* 122 150*       Blood Sugar Average: Last 72 hrs:  (P) [id number]     History of T2DM, at home takes metformin 500 mg QD    Plan:  -f/u with repeat hemoglobin A1c  -SSI, optimize glycemic control

## 2023-10-03 NOTE — ASSESSMENT & PLAN NOTE
This admission: Initial troponin 156, 2hr troponin 1611, 4hr troponin 2631 in the setting of CHF exacerbation vs NSTEMI  S/p one dose of aspirin 324 mg      Plan:  -Continue aspirin, plavix, lipitor  -PRN nitroglycerin sl 0.4 mg  -Consult Cardiology for elevated troponins, appreciate recs

## 2023-10-03 NOTE — ED ATTENDING ATTESTATION
10/3/2023  ISheron DO, saw and evaluated the patient. I have discussed the patient with the resident/non-physician practitioner and agree with the resident's/non-physician practitioner's findings, Plan of Care, and MDM as documented in the resident's/non-physician practitioner's note, except where noted. All available labs and Radiology studies were reviewed. I was present for key portions of any procedure(s) performed by the resident/non-physician practitioner and I was immediately available to provide assistance. At this point I agree with the current assessment done in the Emergency Department. I have conducted an independent evaluation of this patient a history and physical is as follows:    58-year-old female presents with respiratory distress. Patient states it was gradual in onset. Patient brought in by EMS and on CPAP on arrival.  Unable to get complete history secondary to respiratory distress. Per EMS patient has history of congestive heart failure. Patient does complain of chest pain. Denies abdominal pain, no back pain. Denies lower extremity discomfort or swelling. No recent change in medications. Does not take diuretics. States she has been urinating normally. On exam-no distress, heart tachycardic, lungs Rales at the bases with increased work of breathing, abdomen soft nontender, no lower extremity edema, no calf tenderness. Plan- concern for congestive heart failure so we will switch to BiPAP, consider nitro drip depending on blood pressure, check cardiac labs. Infection seems less likely given no fevers, no other symptoms. Do not suspect PE since this was gradual in onset and patient does not have lower extremity swelling, tenderness or pleuritic pain. ED Course     Patient on BiPAP with significant treatment of symptoms. Unable to start nitro secondary to blood pressure. Patient states breathing improved, no chest pain at this time.   I personally reviewed chest x-ray which shows cardiomegaly, vascular congestion and cephalization.     Critical Care Time  CriticalCare Time    Date/Time: 10/3/2023 8:39 AM    Performed by: Julius Ramirez DO  Authorized by: Julius Ramirez DO    Critical care provider statement:     Critical care time (minutes):  35    Critical care time was exclusive of:  Separately billable procedures and treating other patients and teaching time    Critical care was necessary to treat or prevent imminent or life-threatening deterioration of the following conditions:  Respiratory failure    Critical care was time spent personally by me on the following activities:  Obtaining history from patient or surrogate, evaluation of patient's response to treatment, examination of patient, review of old charts, re-evaluation of patient's condition, ordering and review of radiographic studies and ordering and review of laboratory studies    I assumed direction of critical care for this patient from another provider in my specialty: no

## 2023-10-03 NOTE — ED PROVIDER NOTES
History  Chief Complaint   Patient presents with   • Respiratory Distress     Pt brought in via EMS for respiratory distress. Pt alert and oriented, 91% en route on CPAP - states symptoms started yesterday. +CP, non-radiating, hx of CHF. HPI   69-year-old female with history of CAD, hypertension, hyperlipidemia, CHF, CKD 3 presents to the ED via EMS in respiratory distress. Apparently started yesterday and has worsened. She is also having orthopnea. Patient comes in on CPAP, so history is limited. Patient does state that she would like CPR and intubation in the case of respiratory failure or cardiac arrest.    Prior to Admission Medications   Prescriptions Last Dose Informant Patient Reported?  Taking?   acetaminophen (TYLENOL) 325 mg tablet Unknown Self Yes No   Sig: Take 650 mg by mouth every 6 (six) hours as needed for mild pain   allopurinol (ZYLOPRIM) 300 mg tablet Unknown Self No No   Sig: Take 1 tablet (300 mg total) by mouth daily   amLODIPine (NORVASC) 2.5 mg tablet 10/3/2023  No Yes   Sig: Take 1 tablet (2.5 mg total) by mouth daily   aspirin 81 mg chewable tablet  Self No No   Sig: Chew 1 tablet daily for 30 days   atorvastatin (LIPITOR) 20 mg tablet 10/2/2023 Self No Yes   Sig: Take 1 tablet (20 mg total) by mouth daily   clopidogrel (PLAVIX) 75 mg tablet 10/2/2023 Self No Yes   Sig: Take 1 tablet (75 mg total) by mouth daily   metFORMIN (GLUCOPHAGE) 500 mg tablet 10/2/2023 Self No Yes   Sig: Take 1 tablet (500 mg total) by mouth daily   metoprolol succinate (TOPROL-XL) 200 MG 24 hr tablet 10/2/2023  No Yes   Sig: Take 1 tablet (200 mg total) by mouth daily   nitroglycerin (NITROSTAT) 0.4 mg SL tablet Not Taking Self Yes No   Sig: Place 0.4 mg under the tongue every 5 (five) minutes as needed for chest pain   Patient not taking: Reported on 10/3/2023   nitroglycerin (NITROSTAT) 0.4 mg SL tablet Not Taking Self Yes No   Sig: Place 1 tablet under the tongue every 5 (five) minutes as needed   Patient not taking: Reported on 10/3/2023      Facility-Administered Medications: None       Past Medical History:   Diagnosis Date   • Aortic valve stenosis    • Aorto-iliac disease ([address])    • Carotid stenosis    • Cataract of right eye 10/25/2016   • Cataract, left eye 10/18/2016   • Chronic systolic CHF (congestive heart failure) ([facility])    • CKD (chronic kidney disease) stage 3, GFR 30-59 ml/min ([facility])    • Coronary artery disease    • Diabetes mellitus ([facility])     Type 2   • Dyslipidemia    • Gout    • Hyperlipidemia    • Hypertension    • 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   • Irritable bladder    • PAD (peripheral artery disease) ([facility])    • Renal artery stenosis (HCC)     Right       Past Surgical History:   Procedure Laterality Date   • ABDOMINAL AORTIC ANEURYSM REPAIR     • AORTIC VALVE REPLACEMENT     • CAROTID ENDARTERECTOMY Right 2017   • CATARACT EXTRACTION     •  SECTION     • CORONARY ARTERY BYPASS GRAFT     • CORONARY STENT PLACEMENT     • CYSTOSCOPY     • HYSTERECTOMY     • MAMMO (HISTORICAL)  06/10/2019   • ID TEAEC W/PATCH GRF CAROTID VERTB SUBCLAV NECK INC Right 2017    Procedure: CAROTID ENDARTERECTOMY WITH RETROGRADE AND INNOMINATE ARTERY STENTING;  Surgeon: Francisco Lee MD;  Location: [facility];  Service: Vascular   • ID XCAPSL CTRC RMVL INSJ IO LENS PROSTH W/O ECP Right 10/25/2016    Procedure: OD EXTRACTION EXTRACAPSULAR CATARACT PHACO INTRAOCULAR LENS (IOL); Surgeon: Regine Sanford MD;  Location: [facility];  Service: Ophthalmology   • ID XCAPSL CTRC RMVL INSJ IO LENS PROSTH W/O ECP Left 10/18/2016    Procedure: OS EXTRACTION EXTRACAPSULAR CATARACT PHACO INTRAOCULAR LENS (IOL); Surgeon: Regine Sanford MD;  Location: [facility];  Service: Ophthalmology   • VASCULAR SURGERY         Family History   Problem Relation Age of Onset   • Diabetes Mother      I have reviewed and agree with the history as documented.     E-Cigarette/Vaping     E-Cigarette/Vaping Substances Social History     Tobacco Use   • Smoking status: Former     Packs/day: 0.50     Years: 42.00     Total pack years: 21.00     Types: Cigarettes     Start date: 5     Quit date:      Years since quittin.7   • Smokeless tobacco: Never   • Tobacco comments:     quit 20 years ago   Substance Use Topics   • Alcohol use: No   • Drug use: No        Review of Systems   Unable to perform ROS: Severe respiratory distress   Constitutional: Negative for chills and fever. Respiratory: Positive for shortness of breath. Cardiovascular: Positive for chest pain. Physical Exam  ED Triage Vitals   Temperature Pulse Respirations Blood Pressure SpO2   10/03/23 1021 10/03/23 0745 10/03/23 0745 10/03/23 0745 10/03/23 0745   98.2 °F (36.8 °C) 105 (!) 40 125/74 (!) 88 %      Temp Source Heart Rate Source Patient Position - Orthostatic VS BP Location FiO2 (%)   10/03/23 1021 10/03/23 0745 10/03/23 0745 10/03/23 0745 --   Tympanic Monitor Sitting Right arm       Pain Score       10/03/23 0745       8             Orthostatic Vital Signs  Vitals:    10/03/23 0745 10/03/23 0800 10/03/23 0830 10/03/23 0900   BP: 125/74 (!) 87/44 141/64 152/70   Pulse: 105 90 82 74   Patient Position - Orthostatic VS: Sitting Sitting Sitting Sitting       Physical Exam  Constitutional:       General: She is in acute distress. Appearance: She is ill-appearing. HENT:      Mouth/Throat:      Comments: CPAP present  Eyes:      Extraocular Movements: Extraocular movements intact. Conjunctiva/sclera: Conjunctivae normal.   Cardiovascular:      Rate and Rhythm: Regular rhythm. Tachycardia present. Pulses: Normal pulses. Heart sounds: Normal heart sounds. Pulmonary:      Effort: Respiratory distress present. Breath sounds: Rales (Bibasilar) present. Abdominal:      General: There is no distension. Palpations: Abdomen is soft. Tenderness: There is no abdominal tenderness.    Musculoskeletal:      Right lower leg: No edema. Left lower leg: No edema. Skin:     General: Skin is warm and dry. Capillary Refill: Capillary refill takes less than 2 seconds. Neurological:      General: No focal deficit present. Mental Status: She is alert and oriented to person, place, and time.          ED Medications  Medications   sodium chloride 0.9 % bolus 500 mL (0 mL Intravenous Hold 10/3/23 0830)   furosemide (LASIX) injection 40 mg (40 mg Intravenous Given 10/3/23 0917)   aspirin chewable tablet 324 mg (324 mg Oral Given 10/3/23 1055)       Diagnostic Studies  Results Reviewed     Procedure Component Value Units Date/Time    HS Troponin I 2hr [[id number]]  (Abnormal) Collected: 10/03/23 1001    Lab Status: Final result Specimen: Blood from Arm, Left Updated: 10/03/23 1102     hs TnI 2hr 1,611 ng/L      Delta 2hr hsTnI 1,455 ng/L     Comprehensive metabolic panel [[id number]]  (Abnormal) Collected: 10/03/23 0801    Lab Status: Final result Specimen: Blood from Arm, Right Updated: 10/03/23 0850     Sodium 137 mmol/L      Potassium 3.7 mmol/L      Chloride 103 mmol/L      CO2 22 mmol/L      ANION GAP 12 mmol/L      BUN 18 mg/dL      Creatinine 1.02 mg/dL      Glucose 257 mg/dL      Calcium 9.2 mg/dL      AST 14 U/L      ALT 9 U/L      Alkaline Phosphatase 92 U/L      Total Protein 7.0 g/dL      Albumin 3.9 g/dL      Total Bilirubin 0.76 mg/dL      eGFR 50 ml/min/1.73sq m     Narrative:      [place] guidelines for Chronic Kidney Disease (CKD):   •  Stage 1 with normal or high GFR (GFR > 90 mL/min/1.73 square meters)  •  Stage 2 Mild CKD (GFR = 60-89 mL/min/1.73 square meters)  •  Stage 3A Moderate CKD (GFR = 45-59 mL/min/1.73 square meters)  •  Stage 3B Moderate CKD (GFR = 30-44 mL/min/1.73 square meters)  •  Stage 4 Severe CKD (GFR = 15-29 mL/min/1.73 square meters)  •  Stage 5 End Stage CKD (GFR <15 mL/min/1.73 square meters)  Note: GFR calculation is accurate only with a steady state creatinine    HS Troponin 0hr (reflex protocol) [[id number]]  (Abnormal) Collected: 10/03/23 0801    Lab Status: Final result Specimen: Blood from Arm, Right Updated: 10/03/23 0845     hs TnI 0hr 156 ng/L     HS Troponin I 4hr [[id number]]     Lab Status: No result Specimen: Blood     B-Type Natriuretic Peptide(BNP) [[id number]]  (Abnormal) Collected: 10/03/23 0801    Lab Status: Final result Specimen: Blood from Arm, Right Updated: 10/03/23 0843      pg/mL     Manual Differential(PHLEBS Do Not Order) [[id number]]  (Abnormal) Collected: 10/03/23 0801    Lab Status: Final result Specimen: Blood from Arm, Right Updated: 10/03/23 0843     Segmented % 64 %      Bands % 5 %      Lymphocytes % 17 %      Monocytes % 5 %      Eosinophils, % 5 %      Basophils % 0 %      Atypical Lymphocytes % 4 %      Absolute Neutrophils 13.79 Thousand/uL      Lymphocytes Absolute 4.20 Thousand/uL      Monocytes Absolute 1.00 Thousand/uL      Eosinophils Absolute 1.00 Thousand/uL      Basophils Absolute 0.00 Thousand/uL      Total Counted --     Platelet Estimate Adequate    CBC and differential [[id number]]  (Abnormal) Collected: 10/03/23 0801    Lab Status: Final result Specimen: Blood from Arm, Right Updated: 10/03/23 0843     WBC 19.98 Thousand/uL      RBC 4.14 Million/uL      Hemoglobin 12.1 g/dL      Hematocrit 38.5 %      MCV 93 fL      MCH 29.2 pg      MCHC 31.4 g/dL      RDW 14.6 %      MPV 10.1 fL      Platelets 658 Thousands/uL     Narrative: This is an appended report. These results have been appended to a previously verified report. XR chest 1 view portable   ED Interpretation by Phoenix Richards DO (10/03 0235)   Pulmonary edema and congestion. Cardiomegaly. No focal consolidations, pneumothorax. Final Result by Yunior Oseguera MD (10/03 1358)   Evidence of pulmonary edema. Pneumonia is not excluded      Pulmonary edema was noted on the ER preliminary results.                Workstation performed: [id number]               Procedures  ECG 12 Lead Documentation Only    Date/Time: 10/3/2023 11:30 AM    Performed by: Saint Hunter, DO  Authorized by: Saint Hunter, DO    Patient location:  ED  Previous ECG:     Previous ECG:  Compared to current    Comparison ECG info:  From 2017. No longer in A-fib. Similarity:  Changes noted  Interpretation:     Interpretation: abnormal    Rate:     ECG rate:  108    ECG rate assessment: tachycardic    Rhythm:     Rhythm: sinus tachycardia and A-V block    Ectopy:     Ectopy: none    QRS:     QRS axis:  Right    QRS intervals:  Normal  Conduction:     Conduction: abnormal      Abnormal conduction: non-specific intraventricular conduction delay    ST segments:     ST segments:  Normal  T waves:     T waves: normal            ED Course  ED Course as of 10/03/23 1136   Tue Oct 03, 2023   0834 XR chest 1 view portable  Consistent with CHF   0834 CBC and differential(!)  Leukocytosis of 20. Likely reactive. No anemia or thrombocytopenia. 8555 Reassessed patient. She is saturating appropriately on BiPAP and appears much more comfortable. Afebrile and good blood pressure. 0914 hs TnI 0hr(!): 156  Will trend at 2 hours   0914 BNP(!): 951  Elevated. No prior value in chart. 6800 Comprehensive metabolic panel(!)  Hyperglycemia. Baseline kidney function. 5509 XR chest 1 view portable  Radiologist read "pneumonia cannot be excluded" in addition to pulmonary edema. 1011 Patient now being trialed on nasal cannula. 1034 Reassessed patient after half hour on nasal cannula. She has normal work of breathing and is saturating 99%. I will speak with admitting team.   [phone] Reached out to admitting team.   [phone] Discussed case with admitting team who agrees to place patient in observation.                Identification of Seniors at [facility] Most Recent Value   (ISAR) Identification of Seniors at Risk    Before the illness or injury that brought you to the Emergency, did you need someone to help you on a regular basis? 0 Filed at: 10/03/2023 0751   In the last 24 hours, have you needed more help than usual? 1 Filed at: 10/03/2023 3249   Have you been hospitalized for one or more nights during the past 6 months? 0 Filed at: 10/03/2023 0751   In general, do you see well? 0 Filed at: 10/03/2023 0751   In general, do you have serious problems with your memory? 0 Filed at: 10/03/2023 8018   Do you take more than three different medications every day? 1 Filed at: 10/03/2023 0751   ISAR Score 2 Filed at: 10/03/2023 4261                    SBIRT 22yo+    Flowsheet Row Most Recent Value   Initial Alcohol Screen: US AUDIT-C     1. How often do you have a drink containing alcohol? 0 Filed at: 10/03/2023 0751   2. How many drinks containing alcohol do you have on a typical day you are drinking? 0 Filed at: 10/03/2023 0751   3b. FEMALE Any Age, or MALE 65+: How often do you have 4 or more drinks on one occassion? 0 Filed at: 10/03/2023 0751   Audit-C Score 0 Filed at: 10/03/2023 6732   CHARLES: How many times in the past year have you. .. Used an illegal drug or used a prescription medication for non-medical reasons? Never Filed at: 10/03/2023 0751                Medical Decision Making  79-year-old female with history of heart failure presents in respiratory distress. Initially on CPAP via EMS. Also complaining of chest pain. Patient is not on a daily diuretic. Patient tachycardic, tachypneic, hypoxic on presentation. Afebrile. Patient in respiratory distress. Patient with bibasilar Rales. Abdominal exam is benign. No lower extremity edema. Concern for CHF exacerbation, pneumonia, ACS. I will switch patient to BiPAP. I will get CBC, CMP, troponin, BNP, EKG, chest x-ray. Please see ED course for additional MDM. Amount and/or Complexity of Data Reviewed  Labs: ordered. Decision-making details documented in ED Course.   Radiology: ordered and independent interpretation performed. Decision-making details documented in ED Course. Risk  OTC drugs. Prescription drug management. Decision regarding hospitalization. Disposition  Final diagnoses:   CHF exacerbation ([address])   Respiratory failure ([address])     Time reflects when diagnosis was documented in both MDM as applicable and the Disposition within this note     Time User Action Codes Description Comment    10/3/2023 11:35 AM Pandelidis, Isaiah Oppenheim Add [I50.9] CHF exacerbation ([address])     10/3/2023 11:36 AM Pandelidis, Isaiah Oppenheim Add [J96.90] Respiratory failure [facility])       ED Disposition     ED Disposition   Admit    Condition   Stable    Date/Time   Tue Oct 3, 2023 10:56 AM    Comment   --         Follow-up Information    None         Patient's Medications   Discharge Prescriptions    No medications on file     No discharge procedures on file. PDMP Review     None           ED Provider  Attending physically available and evaluated Jasiel Blanco. I managed the patient along with the ED Attending.     Electronically Signed by         Chong Zaragoza DO  10/03/23 [address] [facility],   10/03/23 4456

## 2023-10-03 NOTE — ASSESSMENT & PLAN NOTE
History of hyperlipidemia and CAD. Last lipid panel in 2020 remarkable for slightly elevated triglycerides.  Currently takes 20 mg lipitor QD at home    Plan:  -Increase to Lipitor 40mg

## 2023-10-03 NOTE — CONSULTS
Consultation - General Cardiology Team   Gamal Roland 79 y.o. female MRN: [id number]  Unit/Bed#: CW2 210-01 Encounter: [id number]        History of Present Illness   Physician Requesting Consult: Anthony Phillips MD  Reason for Consult / Principal Problem: Elevated troponin and CHF exacerbation    HPI: Gamal Roland is a 79y.o. year old female who presented with acute hypoxic respiratory failure 10/3 AM. She has a past medical history of CAD w/CABG in 1996, AVR with bioprosthetic stent in 2011, bilateral carotid artery stneosis s/p CEA and R innominate artery stent, HLD, and HTN. On presentation to the ED, she was on CPAP with 88% oxygen saturation, she was then transitioned to BiPAP with 100% saturation. She was given a dose of IV lasix 40 mg with good output and the patient noted improvement of symptoms. She was titrated to 4L NC. Additionally, they obtained an EKG which showed sinus tachycardia with right axis deviation. She had uptrending troponins upto 2,631 and BNP of 951. She was determined to have acute heart failure. She did report having an intermittent cough with yellow sputum production. A viral respiratory panel with COVID/FLU/RSV was negative. On 10/2/2023, she noted to have a period of shortness of breath which disappeared quickly. On 10/3 when she woke up she noted having significant SOB along with mid/sub-sternal chest pain. She used to see Dr. Quentin Dick, but since he retired around 2015/2016, she has not followed up with a cardiologist. She denies fevers, chills, weight gain, lightheadedness, chest pain, SOB, abdominal pain, or palpitations. Review of Systems  Review of system was conducted and was negative except for as stated in the HPI.       Historical Information   Past Medical History:   Diagnosis Date   • Aortic valve stenosis    • Aorto-iliac disease ([address])    • Carotid stenosis    • Cataract of right eye 10/25/2016   • Cataract, left eye 10/18/2016   • Chronic systolic CHF (congestive heart failure) (HCC)    • CKD (chronic kidney disease) stage 3, GFR 30-59 ml/min (HCC)    • Coronary artery disease    • Diabetes mellitus (HCC)     Type 2   • Dyslipidemia    • Gout    • Hyperlipidemia    • Hypertension    • 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   • Irritable bladder    • PAD (peripheral artery disease) (HCC)    • Renal artery stenosis (HCC)     Right     Past Surgical History:   Procedure Laterality Date   • ABDOMINAL AORTIC ANEURYSM REPAIR     • AORTIC VALVE REPLACEMENT     • CAROTID ENDARTERECTOMY Right 2017   • CATARACT EXTRACTION     •  SECTION     • CORONARY ARTERY BYPASS GRAFT     • CORONARY STENT PLACEMENT     • CYSTOSCOPY     • HYSTERECTOMY     • MAMMO (HISTORICAL)  06/10/2019   • [place] TEAEC W/PATCH GRF CAROTID VERTB SUBCLAV NECK INC Right 2017    Procedure: CAROTID ENDARTERECTOMY WITH RETROGRADE AND INNOMINATE ARTERY STENTING;  Surgeon: She Yoder MD;  [facility];  Service: Vascular   • CO XCAPSL CTRC RMVL INSJ IO LENS PROSTH W/O ECP Right 10/25/2016    Procedure: OD EXTRACTION EXTRACAPSULAR CATARACT PHACO INTRAOCULAR LENS (IOL); Surgeon: Conrad Velasquez MD;  [facility];  Service: Ophthalmology   • CO XCAPSL CTRC RMVL INSJ IO LENS PROSTH W/O ECP Left 10/18/2016    Procedure: OS EXTRACTION EXTRACAPSULAR CATARACT PHACO INTRAOCULAR LENS (IOL);   Surgeon: Conrad Velasquez MD;  [facility];  Service: Ophthalmology   • VASCULAR SURGERY       Social History     Substance and Sexual Activity   Alcohol Use No     Social History     Substance and Sexual Activity   Drug Use No     Social History     Tobacco Use   Smoking Status Former   • Packs/day: 0.50   • Years: 42.00   • Total pack years: 21.00   • Types: Cigarettes   • Start date: 5   • Quit date: 18   • Years since quittin.7   Smokeless Tobacco Never   Tobacco Comments    quit 20 years ago     Family History:   Family History   Problem Relation Age of Onset   • Diabetes Mother        Meds/Allergies   Hospital Medications:   Current Facility-Administered Medications   Medication Dose Route Frequency   • amLODIPine (NORVASC) tablet 2.5 mg  2.5 mg Oral Daily   • [START ON 10/4/2023] aspirin chewable tablet 81 mg  81 mg Oral Daily   • atorvastatin (LIPITOR) tablet 20 mg  20 mg Oral Daily   • clopidogrel (PLAVIX) tablet 75 mg  75 mg Oral Daily   • enoxaparin (LOVENOX) subcutaneous injection 40 mg  40 mg Subcutaneous Daily   • furosemide (LASIX) injection 40 mg  40 mg Intravenous BID (diuretic)   • insulin lispro (HumaLOG) 100 units/mL subcutaneous injection 2-12 Units  2-12 Units Subcutaneous TID AC   • magnesium sulfate 2 g/50 mL IVPB (premix) 2 g  2 g Intravenous Once   • [START ON 10/4/2023] metoprolol succinate (TOPROL-XL) 24 hr tablet 200 mg  200 mg Oral Daily   • nitroglycerin (NITROSTAT) SL tablet 0.4 mg  0.4 mg Sublingual Q5 Min PRN     Home Medications:   Medications Prior to Admission   Medication   • atorvastatin (LIPITOR) 20 mg tablet   • clopidogrel (PLAVIX) 75 mg tablet   • metFORMIN (GLUCOPHAGE) 500 mg tablet   • metoprolol succinate (TOPROL-XL) 200 MG 24 hr tablet   • acetaminophen (TYLENOL) 325 mg tablet   • allopurinol (ZYLOPRIM) 300 mg tablet   • amLODIPine (NORVASC) 2.5 mg tablet   • aspirin 81 mg chewable tablet   • nitroglycerin (NITROSTAT) 0.4 mg SL tablet   • nitroglycerin (NITROSTAT) 0.4 mg SL tablet       Allergies   Allergen Reactions   • Medical Tape    • Other Rash     Pt states she is allergic to tape adhesive       Objective   Vitals: Blood pressure 139/64, pulse 69, temperature 98 °F (36.7 °C), resp. rate 20, weight 82.3 kg (181 lb 6.4 oz), SpO2 98 %. Orthostatic Blood Pressures    Flowsheet Row Most Recent Value   Blood Pressure 139/64 filed at 10/03/2023 1526   Patient Position - Orthostatic VS Sitting filed at 10/03/2023 0900            Invasive Devices     Peripheral Intravenous Line  Duration           Peripheral IV 10/03/23 Dorsal (posterior); Right Forearm <1 day    Peripheral IV 10/03/23 Left Antecubital <1 day                Physical Exam  GEN: Nomi Hussein appears well, alert and oriented x 3, pleasant and cooperative   HEENT:  Normocephalic, atraumatic, anicteric, moist mucous membranes  NECK:  JVD to the base of the left ear with positive hepatojugular reflex   HEART: Regular rhythm, Regular rate, normal S1 and S2, systolic murmur in the tricuspid and mitral areas suggestive of regurgitations, no clicks, gallops or rubs   LUNGS: Left lung field was clear to auscultation with decreased breath sounds in the right lung fields; no wheezes, rales, or rhonchi; respiration nonlabored on 4L NC  ABDOMEN:  Normoactive bowel sounds, soft, no tenderness, no distention  EXTREMITIES: peripheral pulses palpable; trace to +1 pitting edema bilaterally  NEURO: no gross focal findings; cranial nerves grossly intact   SKIN:  Dry, intact, warm to touch    Lab Results:   CBC with diff:   Results from last 7 days   Lab Units 10/03/23  0801   WBC Thousand/uL 19.98*   RBC Million/uL 4.14   HEMOGLOBIN g/dL 12.1   HEMATOCRIT % 38.5   MCV fL 93   MCH pg 29.2   MCHC g/dL 31.4   RDW % 14.6   MPV fL 10.1   PLATELETS Thousands/uL 315     CMP:   Results from last 7 days   Lab Units 10/03/23  0801   SODIUM mmol/L 137   CHLORIDE mmol/L 103   CO2 mmol/L 22   BUN mg/dL 18   CREATININE mg/dL 1.02   CALCIUM mg/dL 9.2   AST U/L 14   ALT U/L 9   ALK PHOS U/L 92   EGFR ml/min/1.73sq m 50     HS Troponin:   0   Lab Value Date/Time    HSTNI0 156 (H) 10/03/2023 0801    HSTNI2 1,611 (H) 10/03/2023 1001    HSTNI4 2,631 (H) 10/03/2023 1235     BNP:   Results from last 7 days   Lab Units 10/03/23  0801   POTASSIUM mmol/L 3.7   CHLORIDE mmol/L 103   CO2 mmol/L 22   BUN mg/dL 18   CREATININE mg/dL 1.02   CALCIUM mg/dL 9.2   EGFR ml/min/1.73sq m 50     TSH:     Magnesium:   Results from last 7 days   Lab Units 10/03/23  0801   MAGNESIUM mg/dL 1.6*     Results from last 7 days   Lab Units 10/03/23  123 10/03/23  1001 10/03/23  0801   HS TNI 0HR ng/L  --   --  156*   HS TNI 2HR ng/L  --  1,611*  --    HS TNI 4HR ng/L 2,631*  --   --          Results from last 7 days   Lab Units 10/03/23  0801   POTASSIUM mmol/L 3.7   CO2 mmol/L 22   CHLORIDE mmol/L 103   BUN mg/dL 18   CREATININE mg/dL 1.02     Results from last 7 days   Lab Units 10/03/23  0801   HEMOGLOBIN g/dL 12.1   HEMATOCRIT % 38.5   PLATELETS Thousands/uL 315             Imaging: I have personally reviewed pertinent films in PACS      Telemetry:   Sinus rhythm and normal rate. EKG:   Date: 10/3/2023  Interpretation: sinus tachycardia with non-specific intraventricular conduction delay      Previous STRESS TEST:  No results found for this or any previous visit. No results found for this or any previous visit. No results found for this or any previous visit. Previous Cath/PCI:  Catheterization in  at [address] [place] [address] but unable to review results. ECHO:  Results for orders placed during the hospital encounter of 17    Echo complete with contrast if indicated    Narrative  [address] [place] [address], [address] [place] [address]  [phone]    Transthoracic Echocardiogram  2D, M-mode, Doppler, and Color Doppler    Study date:  2017    Patient: Luisa Gonzalez  MR number: [id number]  Account number: [de-identified]  : 1940  Age: 67 years  Gender: Female  Status: Inpatient  Location: Bedside  Height: 63 in  Weight: 201 lb  BP: 107/ 53 mmHg    Indications: Murmur    Diagnoses: R01.1 - Cardiac murmur, unspecified    Sonographer:  FLOR Grier  Primary Physician:  Blanca García MD  Referring Physician:  Hilario Oconnor MD  Group:  [facility]  Interpreting Physician:  Margot Blackmon MD    SUMMARY    LEFT VENTRICLE:  Systolic function was normal. Ejection fraction was estimated to be 65 %. There were no regional wall motion abnormalities.   Wall thickness was mildly increased. There was mild concentric hypertrophy. Features were consistent with a pseudonormal left ventricular filling pattern, with concomitant abnormal relaxation and increased filling pressure (grade 2 diastolic dysfunction). LEFT ATRIUM:  The atrium was mildly to moderately dilated. MITRAL VALVE:  There was moderate annular calcification. There was moderate regurgitation. AORTIC VALVE:  A bioprosthesis was present. It exhibited normal function. TRICUSPID VALVE:  There was moderate regurgitation. Estimated peak PA pressure was 55 mmHg. The findings suggest moderate pulmonary hypertension. PULMONIC VALVE:  There was trace regurgitation. HISTORY: PRIOR HISTORY: Hypertension, hyperlipidemia, CAD, CABG, bioprosthetic AVR, former smoker, DM2, gout, kidney surgery    PROCEDURE: The procedure was performed at the bedside. This was a routine study. The transthoracic approach was used. The study included complete 2D imaging, M-mode, complete spectral Doppler, and color Doppler. This was a technically  difficult study. LEFT VENTRICLE: Size was normal. Systolic function was normal. Ejection fraction was estimated to be 65 %. There were no regional wall motion abnormalities. Wall thickness was mildly increased. There was mild concentric hypertrophy. DOPPLER: Features were consistent with a pseudonormal left ventricular filling pattern, with concomitant abnormal relaxation and increased filling pressure (grade 2 diastolic dysfunction). RIGHT VENTRICLE: The size was normal. Systolic function was normal. Wall thickness was normal.    LEFT ATRIUM: The atrium was mildly to moderately dilated. RIGHT ATRIUM: Size was normal.    MITRAL VALVE: There was moderate annular calcification. Valve structure was normal. There was normal leaflet separation. DOPPLER: The transmitral velocity was within the normal range. There was no evidence for stenosis. There was moderate  regurgitation.     AORTIC VALVE: A bioprosthesis was present. It exhibited normal function. DOPPLER: There was no evidence for stenosis. There was no regurgitation. TRICUSPID VALVE: The valve structure was normal. There was normal leaflet separation. DOPPLER: The transtricuspid velocity was within the normal range. There was no evidence for stenosis. There was moderate regurgitation. Estimated peak PA  pressure was 55 mmHg. The findings suggest moderate pulmonary hypertension. PULMONIC VALVE: Leaflets exhibited normal thickness, no calcification, and normal cuspal separation. DOPPLER: The transpulmonic velocity was within the normal range. There was trace regurgitation. PERICARDIUM: There was no pericardial effusion. The pericardium was normal in appearance. AORTA: The root exhibited normal size. SYSTEM MEASUREMENT TABLES    2D  %FS: 31.14 %  Ao Diam: 2.57 cm  EDV(Teich): 87.36 ml  EF(Teich): 59.11 %  ESV(Teich): 35.72 ml  IVSd: 1.24 cm  LA Area: 17.21 cm2  LA Diam: 4.5 cm  LVEDV MOD A4C: 77.96 ml  LVEF MOD A4C: 56.03 %  LVESV MOD A4C: 34.28 ml  LVIDd: 4.39 cm  LVIDs: 3.03 cm  LVLd A4C: 6.87 cm  LVLs A4C: 6.3 cm  LVPWd: 1.13 cm  RA Area: 8.82 cm2  RVIDd: 2.73 cm  SV MOD A4C: 43.68 ml  SV(Teich): 51.64 ml    CW  AV Env. Ti: 378.32 ms  AV VTI: 33.87 cm  AV Vmax: 1.54 m/s  AV Vmean: 0.9 m/s  AV maxP.46 mmHg  AV meanP.08 mmHg  TR Vmax: 3.55 m/s  TR maxP.45 mmHg    MM  TAPSE: 1.94 cm    PW  E': 0.06 m/s  E/E': 24.49  LVOT Env. Ti: 383.7 ms  LVOT VTI: 22.85 cm  LVOT Vmax: 0.95 m/s  LVOT Vmean: 0.6 m/s  LVOT maxPG: 3.59 mmHg  LVOT meanP.76 mmHg  MV A Jaylan: 0.76 m/s  MV Dec Robertson: 7.66 m/s2  MV DecT: 204.56 ms  MV E Jaylan: 1.57 m/s  MV E/A Ratio: 2.06  MV PHT: 59.32 ms  MVA By PHT: 3.71 cm2    [facility] Accredited [facility]chocardi[facility]    Prepared and electronically signed by    Irene Bustamante MD  Signed 2017 17:20:36    No results found for this or any previous visit.       ABDI:  No results found for this or any previous visit. No results found for this or any previous visit. CMR:  No results found for this or any previous visit. No results found for this or any previous visit. No results found for this or any previous visit. HOLTER  No results found for this or any previous visit. No results found for this or any previous visit. Assessment/Plan     1. Acute heart failure of unclear etiology  - Given the poor history as patient had difficulty remembering events preceding symptom onset, the etiology is unclear at this time. It may be related to underlying CAD or progression of valvular disease. She denies missing medications. - F/u on results of ECHO  - Patient does not take lasix at home, will start IV lasix 40 mg BID for diuresis    2. Elevated troponin, likely 2/2 to demand ischemia from heart failure exacerbation  - Will continue to diurese the patient  - Will consider adding on imdur if blood pressure allows     3. History of CAD with prior CABG and Stent of LAD  - Continue on ASA 81 mg, Plavix 75 mg, Toprol- mg, and Lipitor 20 mg    4.  Hypertension  - Continue Norvasc 2.5 mg qd              Luiza Soham  MS-4    ==========================================================================================

## 2023-10-03 NOTE — ED NOTES
RT at bedside - pt placed on BiPAP.  Settings 14/6, 100%, immediate improvement to SpO2, now 100%     Patrick Woods, RN  10/03/23 2008

## 2023-10-04 ENCOUNTER — HOME HEALTH ADMISSION (OUTPATIENT)
Dept: HOME HEALTH SERVICES | Facility: HOME HEALTHCARE | Age: 83
End: 2023-10-04

## 2023-10-04 ENCOUNTER — APPOINTMENT (OUTPATIENT)
Dept: NON INVASIVE DIAGNOSTICS | Facility: HOSPITAL | Age: 83
End: 2023-10-04
Payer: COMMERCIAL

## 2023-10-04 LAB
ANION GAP SERPL CALCULATED.3IONS-SCNC: 10 MMOL/L
AORTIC ROOT: 2.7 CM
AORTIC VALVE MEAN VELOCITY: 8.3 M/S
APICAL FOUR CHAMBER EJECTION FRACTION: 65 %
ASCENDING AORTA: 3.2 CM
AV LVOT MEAN GRADIENT: 1 MMHG
AV LVOT PEAK GRADIENT: 3 MMHG
AV MEAN GRADIENT: 3 MMHG
AV PEAK GRADIENT: 7 MMHG
AV VELOCITY RATIO: 0.64
BUN SERPL-MCNC: 21 MG/DL (ref 5–25)
CALCIUM SERPL-MCNC: 9.1 MG/DL (ref 8.4–10.2)
CHLORIDE SERPL-SCNC: 101 MMOL/L (ref 96–108)
CO2 SERPL-SCNC: 26 MMOL/L (ref 21–32)
CREAT SERPL-MCNC: 1.14 MG/DL (ref 0.6–1.3)
DOP CALC AO PEAK VEL: 1.29 M/S
DOP CALC AO VTI: 27.18 CM
DOP CALC LVOT PEAK VEL VTI: 17.66 CM
DOP CALC LVOT PEAK VEL: 0.82 M/S
E WAVE DECELERATION TIME: 311 MS
ERYTHROCYTE [DISTWIDTH] IN BLOOD BY AUTOMATED COUNT: 14.6 % (ref 11.6–15.1)
EST. AVERAGE GLUCOSE BLD GHB EST-MCNC: 146 MG/DL
FRACTIONAL SHORTENING: 28 % (ref 28–44)
GFR SERPL CREATININE-BSD FRML MDRD: 44 ML/MIN/1.73SQ M
GLUCOSE P FAST SERPL-MCNC: 120 MG/DL (ref 65–99)
GLUCOSE SERPL-MCNC: 120 MG/DL (ref 65–140)
GLUCOSE SERPL-MCNC: 126 MG/DL (ref 65–140)
GLUCOSE SERPL-MCNC: 127 MG/DL (ref 65–140)
GLUCOSE SERPL-MCNC: 163 MG/DL (ref 65–140)
GLUCOSE SERPL-MCNC: 307 MG/DL (ref 65–140)
HBA1C MFR BLD: 6.7 %
HCT VFR BLD AUTO: 34.6 % (ref 34.8–46.1)
HGB BLD-MCNC: 11 G/DL (ref 11.5–15.4)
INTERVENTRICULAR SEPTUM IN DIASTOLE (PARASTERNAL SHORT AXIS VIEW): 1.9 CM
INTERVENTRICULAR SEPTUM: 1.9 CM (ref 0.6–1.1)
LAAS-AP2: 21.7 CM2
LAAS-AP4: 17.1 CM2
LEFT ATRIUM SIZE: 5.4 CM
LEFT ATRIUM VOLUME (MOD BIPLANE): 60 ML
LEFT INTERNAL DIMENSION IN SYSTOLE: 3.1 CM (ref 2.1–4)
LEFT VENTRICULAR INTERNAL DIMENSION IN DIASTOLE: 4.3 CM (ref 3.5–6)
LEFT VENTRICULAR POSTERIOR WALL IN END DIASTOLE: 1.2 CM
LEFT VENTRICULAR STROKE VOLUME: 44 ML
LVSV (TEICH): 44 ML
MAGNESIUM SERPL-MCNC: 2 MG/DL (ref 1.9–2.7)
MCH RBC QN AUTO: 28.3 PG (ref 26.8–34.3)
MCHC RBC AUTO-ENTMCNC: 31.8 G/DL (ref 31.4–37.4)
MCV RBC AUTO: 89 FL (ref 82–98)
MV E'TISSUE VEL-SEP: 5 CM/S
MV PEAK A VEL: 1.03 M/S
MV PEAK E VEL: 103 CM/S
MV STENOSIS PRESSURE HALF TIME: 90 MS
MV VALVE AREA P 1/2 METHOD: 2.44 CM2
PLATELET # BLD AUTO: 230 THOUSANDS/UL (ref 149–390)
PMV BLD AUTO: 10.3 FL (ref 8.9–12.7)
POTASSIUM SERPL-SCNC: 3.6 MMOL/L (ref 3.5–5.3)
RBC # BLD AUTO: 3.89 MILLION/UL (ref 3.81–5.12)
RIGHT ATRIAL 2D VOLUME: 26 ML
RIGHT ATRIUM AREA SYSTOLE A4C: 12.6 CM2
RIGHT VENTRICLE ID DIMENSION: 3.1 CM
SL CV LEFT ATRIUM LENGTH A2C: 5.7 CM
SL CV LV EF: 55
SL CV PED ECHO LEFT VENTRICLE DIASTOLIC VOLUME (MOD BIPLANE) 2D: 82 ML
SL CV PED ECHO LEFT VENTRICLE SYSTOLIC VOLUME (MOD BIPLANE) 2D: 38 ML
SODIUM SERPL-SCNC: 137 MMOL/L (ref 135–147)
TR MAX PG: 26 MMHG
TR PEAK VELOCITY: 2.6 M/S
TRICUSPID ANNULAR PLANE SYSTOLIC EXCURSION: 1.7 CM
TRICUSPID VALVE PEAK REGURGITATION VELOCITY: 2.56 M/S
WBC # BLD AUTO: 12.01 THOUSAND/UL (ref 4.31–10.16)

## 2023-10-04 PROCEDURE — 93306 TTE W/DOPPLER COMPLETE: CPT | Performed by: INTERNAL MEDICINE

## 2023-10-04 PROCEDURE — 83735 ASSAY OF MAGNESIUM: CPT

## 2023-10-04 PROCEDURE — 99232 SBSQ HOSP IP/OBS MODERATE 35: CPT | Performed by: INTERNAL MEDICINE

## 2023-10-04 PROCEDURE — 97166 OT EVAL MOD COMPLEX 45 MIN: CPT

## 2023-10-04 PROCEDURE — 93306 TTE W/DOPPLER COMPLETE: CPT

## 2023-10-04 PROCEDURE — 97163 PT EVAL HIGH COMPLEX 45 MIN: CPT

## 2023-10-04 PROCEDURE — 85027 COMPLETE CBC AUTOMATED: CPT

## 2023-10-04 PROCEDURE — 80048 BASIC METABOLIC PNL TOTAL CA: CPT

## 2023-10-04 PROCEDURE — 82948 REAGENT STRIP/BLOOD GLUCOSE: CPT

## 2023-10-04 RX ORDER — ISOSORBIDE MONONITRATE 30 MG/1
30 TABLET, EXTENDED RELEASE ORAL DAILY
Status: DISCONTINUED | OUTPATIENT
Start: 2023-10-04 | End: 2023-10-05 | Stop reason: HOSPADM

## 2023-10-04 RX ORDER — ATORVASTATIN CALCIUM 40 MG/1
40 TABLET, FILM COATED ORAL DAILY
Status: DISCONTINUED | OUTPATIENT
Start: 2023-10-05 | End: 2023-10-05 | Stop reason: HOSPADM

## 2023-10-04 RX ADMIN — FUROSEMIDE 40 MG: 10 INJECTION, SOLUTION INTRAMUSCULAR; INTRAVENOUS at 09:08

## 2023-10-04 RX ADMIN — METOPROLOL SUCCINATE 200 MG: 100 TABLET, EXTENDED RELEASE ORAL at 09:07

## 2023-10-04 RX ADMIN — CLOPIDOGREL BISULFATE 75 MG: 75 TABLET ORAL at 09:08

## 2023-10-04 RX ADMIN — ISOSORBIDE MONONITRATE 30 MG: 30 TABLET, EXTENDED RELEASE ORAL at 09:08

## 2023-10-04 RX ADMIN — AMLODIPINE BESYLATE 2.5 MG: 2.5 TABLET ORAL at 09:07

## 2023-10-04 RX ADMIN — ATORVASTATIN CALCIUM 20 MG: 20 TABLET, FILM COATED ORAL at 09:07

## 2023-10-04 RX ADMIN — ASPIRIN 81 MG CHEWABLE TABLET 81 MG: 81 TABLET CHEWABLE at 09:08

## 2023-10-04 RX ADMIN — INSULIN LISPRO 8 UNITS: 100 INJECTION, SOLUTION INTRAVENOUS; SUBCUTANEOUS at 12:02

## 2023-10-04 RX ADMIN — FUROSEMIDE 40 MG: 10 INJECTION, SOLUTION INTRAMUSCULAR; INTRAVENOUS at 16:51

## 2023-10-04 RX ADMIN — ENOXAPARIN SODIUM 40 MG: 40 INJECTION SUBCUTANEOUS at 09:08

## 2023-10-04 NOTE — PHYSICAL THERAPY NOTE
Physical Therapy Evaluation    Patient's Name: Deep Garcia    Admitting Diagnosis  Respiratory failure ([address]) [J96.90]  CHF (congestive heart failure) ([address]) [I50.9]  Elevated troponin [R79.89]  CHF exacerbation ([address]) [I50.9]    Problem List  Patient Active Problem List   Diagnosis    Cataract, left eye    Cataract of right eye    Asymptomatic bilateral carotid artery stenosis    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HCC)    Demand ischemia of myocardium    Hyperlipidemia    Hypertension    CKD (chronic kidney disease) stage 3, GFR 30-59 ml/min (HCC)    Coronary artery disease    Non-ST elevation myocardial infarction (NSTEMI), type 2    Atrial fibrillation (HCC)    Intermittent claudication ([address])    Type 2 diabetes mellitus with chronic kidney disease, without long-term current use of insulin, unspecified CKD stage (HCC)    Elevated troponin    CHF exacerbation (HCC)    Respiratory insufficiency       Past Medical History  Past Medical History:   Diagnosis Date    Aortic valve stenosis     Aorto-iliac disease (HCC)     Carotid stenosis     Cataract of right eye 10/25/2016    Cataract, left eye 10/18/2016    Chronic systolic CHF (congestive heart failure) (HCC)     CKD (chronic kidney disease) stage 3, GFR 30-59 ml/min (HCC)     Coronary artery disease     Diabetes mellitus (HCC)     Type 2    Dyslipidemia     Gout     Hyperlipidemia     Hypertension     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    Irritable bladder     PAD (peripheral artery disease) (HCC)     Renal artery stenosis ([address])     Right       Past Surgical History  Past Surgical History:   Procedure Laterality Date    ABDOMINAL AORTIC ANEURYSM REPAIR      AORTIC VALVE REPLACEMENT      CAROTID ENDARTERECTOMY Right 2017    CATARACT EXTRACTION       SECTION      CORONARY ARTERY BYPASS GRAFT      CORONARY STENT PLACEMENT      CYSTOSCOPY      HYSTERECTOMY      MAMMO (HISTORICAL)  06/10/2019    [place] TEAEC W/PATCH GRF CAROTID VERTB SUBCLAV NECK INC Right 1/27/2017    Procedure: CAROTID ENDARTERECTOMY WITH RETROGRADE AND INNOMINATE ARTERY STENTING;  Surgeon: Sanchez Diaz MD;  [facility];  Service: Vascular    PA XCAPSL CTRC RMVL INSJ IO LENS PROSTH W/O ECP Right 10/25/2016    Procedure: OD EXTRACTION EXTRACAPSULAR CATARACT PHACO INTRAOCULAR LENS (IOL); Surgeon: Leeann Vaz MD;  [facility];  Service: Ophthalmology    PA XCAPSL CTRC RMVL INSJ IO LENS PROSTH W/O ECP Left 10/18/2016    Procedure: OS EXTRACTION EXTRACAPSULAR CATARACT PHACO INTRAOCULAR LENS (IOL); Surgeon: Leeann Vaz MD;  Location[facility];  Service: Ophthalmology    VASCULAR SURGERY          10/04/23 0810   PT Last Visit   PT Visit Date 10/04/23   Note Type   Note type Evaluation   Pain Assessment   Pain Assessment Tool 0-10   Pain Score 2   Pain Location/Orientation Location: Generalized  (and R knee)   Patient's Stated Pain Goal No pain   Hospital Pain Intervention(s) Ambulation/increased activity   Restrictions/Precautions   Weight Bearing Precautions Per Order No   Other Precautions Multiple lines;Telemetry; Fall Risk;Pain  (O2 removed for gait- remained 94-96% on RA w/ gait)   Home Living   Type of Home House   Additional Comments Resides w/ son in 3 story home, only uses first 2 floors. Indep self care, is not on home O2.  ambulates w/ out device   Prior Function   Level of [place] Independent with ADLs; Independent with functional mobility   Falls in the last 6 months 1 to 4   General   Family/Caregiver Present No   Cognition   Overall Cognitive Status WFL   Arousal/Participation Responsive   Orientation Level Oriented X4   Memory Unable to assess   Following Commands Follows one step commands without difficulty   Subjective   Subjective states she feels OK.  mild SOB.   cooperative w/ session   RLE Assessment   RLE Assessment   (strength grossly 4/5- assessed w/ mobility)   LLE Assessment   LLE Assessment   (strength grossly 4/5- assessed w/ mobility) Coordination   Movements are Fluid and Coordinated 1   Bed Mobility   Supine to Sit 5  Supervision   Sit to Supine 5  Supervision   Transfers   Sit to Stand 5  Supervision   Additional items Increased time required;Verbal cues   Stand to Sit 5  Supervision   Additional items Increased time required   Ambulation/Elevation   Gait pattern   (slow, wide ЕКАТЕРИНА)   Gait Assistance   (CGA/S)   Additional items Assist x 1   Assistive Device None   Distance 100'x2, standing rest x 1 min   Balance   Static Sitting Normal   Dynamic Sitting Good   Static Standing Fair   Dynamic Standing Fair   Ambulatory Fair -   Endurance Deficit   Endurance Deficit Yes   Activity Tolerance   Activity Tolerance Patient limited by fatigue;Treatment limited secondary to medical complications (Comment); Patient limited by pain   Nurse Made Aware yes   Assessment   Prognosis Good   Problem List Decreased strength;Decreased endurance;Decreased mobility; Impaired balance;Decreased coordination   Assessment Pt seen for physical therapy evaluation. Pt is an 80 y/o female w/ history/comorbidities of CAD, CABG, HTN, HLD, CHF, PAD, renal artery stenosis, gout, DM II who is now admitted w/ respiratory distress x 1 day- placed on bipap on admit. Dx is CHF exacerbation. Due to acute medical issues, pain, fall risk, note unstable clinical picture. PT consulted to assess mobility, d/c needs. Pt preswnts w/ minimally decreased functional mob, standing balance, endurance, B LE strength, barriers at home. Pt will benefit from skilled PT to correct for the above problems.   When stable, recommend d/c home w/ home PT   Goals   Patient Goals to go home   STG Expiration Date 10/18/23   Short Term Goal #1 1-2 wks: (if not d/c home today): Bed mob and transfers w/ indep, standing balance to good/normal dynamically, ambulate 200-300 ft w/ indep, increase B LE strength by 1/2-1 grade, ambulate 1 flight of stairs w S   PT Treatment Day 0   Plan Treatment/Interventions LE strengthening/ROM; Functional transfer training;Elevations; Therapeutic exercise; Endurance training;Patient/family training;Bed mobility; Equipment eval/education;Gait training   PT Frequency 2-3x/wk   Recommendation   PT Discharge Recommendation Home with home health rehabilitation   [facility] Mobility Inpatient   Turning in Flat Bed Without Bedrails 4   Lying on Back to Sitting on Edge of Flat Bed Without Bedrails 4   Moving Bed to Chair 3   Standing Up From Chair Using Arms 4   Walk in Room 3   Climb 3-5 Stairs With Railing 3   Basic Mobility Inpatient Raw Score 21   Basic Mobility Standardized Score 45.55   Highest Level Of Mobility   JH-HLM Goal 6: Walk 10 steps or more   JH-HLM Achieved 7: Walk 25 feet or more           Jairo Donis PT, DPT, CSRS

## 2023-10-04 NOTE — UTILIZATION REVIEW
Initial Clinical Review    OBSERVATION WRITTEN 10/3/23 @ 1056 CONVERTED TO INPATIENT ADMISSION 10/4/23 @ 1353 DUE TO   CHF EXACERBATION, REQUIRING AT LEAST A 2 MIDNIGHT STAY. Admission: Date/Time/Statement:   Admission Orders (From admission, onward)     Ordered        10/04/23 1353  Inpatient Admission  Once            10/03/23 1056  Place in Observation  Once                      Orders Placed This Encounter   Procedures   • Inpatient Admission     Standing Status:   Standing     Number of Occurrences:   1     Order Specific Question:   Level of Care     Answer:   Med Surg [16]     Comments:   tele     Order Specific Question:   Bed request comments     Answer:   tele     Order Specific Question:   Estimated length of stay     Answer:   More than 2 Midnights     Order Specific Question:   Certification     Answer:   I certify that inpatient services are medically necessary for this patient for a duration of greater than two midnights. See H&P and MD Progress Notes for additional information about the patient's course of treatment. ED Arrival Information     Expected   -    Arrival   10/3/2023 07:43    Acuity   Emergent            Means of arrival   Ambulance    Escorted by   [facility]    Service   [facility]    Admission type   Emergency            Arrival complaint   CHF           Chief Complaint   Patient presents with   • Respiratory Distress     Pt brought in via EMS for respiratory distress. Pt alert and oriented, 91% en route on CPAP - states symptoms started yesterday. +CP, non-radiating, hx of CHF. Initial Presentation: 79 y.o. female who presented to [address] [place] [address] ED via EMS initially admitted Observation status then converted to Inpatient due to CHF Exacerbation. Presented due to significant dyspnea and noted respiratory distress with O2 sat 88% on CPAP, transitioned to BiPAP. Tachycardic and tachypneic upon arrival.  Also c/o chest pain PTA. Given IV Lasix in ED. Labs notable for leukocytosis, , trop 156. PMH: CAD with CABG with stents placed, HTN, HLD, CHF, T2DM, bilateral carotid artery stenosis s/p R CEA and R innominate stent, CKD 3. Plan: med surg telemetry, order Echo, cardiology consult, IV Lasix and Nitro prn, cardiac diet, trend wbc and temp, continue home amlodipine, ASA, plavix and lipitor, start accuchecks w/ SSI, monitor renal function. PT OT eval, CM consult for dispo planning. 10/3 Per Cardiology: volume overload with some chest pain that has typical and atypical features. Believe primary event is decompensated heart failure. History of mitral regurgitation may be contributing factor. Increase IV Lasix to 40 IV twice daily. Get an echocardiogram tomorrow to evaluate valvular status. If blood pressures remain well controlled can add a low-dose of isosorbide mononitrate follow chest pain closely next 24 to 48 hours may need Lexiscan Myoview.     10/4/2023 Inpatient Admission:  Continue above tx plan including telemetry, labs, trops, accuchecks. PT recommending home with home health rehab, CM following.      ED Triage Vitals   Temperature Pulse Respirations Blood Pressure SpO2   10/03/23 1021 10/03/23 0745 10/03/23 0745 10/03/23 0745 10/03/23 0745   98.2 °F (36.8 °C) 105 (!) 40 125/74 (!) 88 %      Temp Source Heart Rate Source Patient Position - Orthostatic VS BP Location FiO2 (%)   10/03/23 1021 10/03/23 0745 10/03/23 0745 10/03/23 0745 --   Tympanic Monitor Sitting Right arm       Pain Score       10/03/23 0745       8          Wt Readings from Last 1 Encounters:   10/04/23 82.1 kg (181 lb)     Additional Vital Signs:   Date/Time Temp Pulse Resp BP MAP (mmHg) SpO2 Calculated FIO2 (%) - Nasal Cannula Nasal Cannula O2 Flow Rate (L/min) O2 Device O2 Interface Device Patient Position - Orthostatic VS   10/04/23 10:31:25 98.1 °F (36.7 °C) 110 Abnormal  19 137/64 88 94 % -- -- None (Room air) -- Lying   10/04/23 0947 -- -- -- -- -- -- -- -- None (Room air) -- --   10/04/23 07:05:07 98.2 °F (36.8 °C) 113 Abnormal  19 164/66 99 94 % -- -- -- -- --   10/03/23 23:05:21 98.3 °F (36.8 °C) 69 -- 162/59 93 94 % 28 2 L/min Nasal cannula -- Lying   10/03/23 15:26:04 98 °F (36.7 °C) 69 -- 139/64 89 98 % 28 2 L/min Nasal cannula -- --   10/03/23 15:24:51 98 °F (36.7 °C) 68 -- 139/64 89 97 % -- -- -- -- --   10/03/23 1457 -- 75 -- -- -- 96 % -- -- -- -- --   10/03/23 13:01:53 98.6 °F (37 °C) 65 -- 148/108 Abnormal  121 96 % -- -- -- -- --   10/03/23 1300 -- 70 -- -- -- -- -- -- -- -- --   10/03/23 12:57:21 -- 72 20 148/108 Abnormal  121 96 % -- -- -- -- --   10/03/23 1230 -- 74 22 140/64 92 98 % 36 4 L/min Nasal cannula -- --   10/03/23 1021 98.2 °F (36.8 °C) -- -- -- -- -- -- -- -- -- --   10/03/23 1012 -- -- -- -- -- -- 36 4 L/min Nasal cannula -- --   10/03/23 0900 -- 74 24 Abnormal  152/70 100 100 % -- -- None (Room air) -- Sitting   10/03/23 0830 -- 82 29 Abnormal  141/64 92 100 % -- -- BiPAP -- Sitting   10/03/23 0800 -- 90 33 Abnormal  87/44 Abnormal  58 Abnormal  100 % -- -- BiPAP -- Sitting   10/03/23 0755 -- -- -- -- -- -- -- -- -- Face mask  --   O2 Interface Device: Large at 10/03/23 0755   10/03/23 0745 -- 105 40 Abnormal  125/74 -- 88 % Abnormal  -- -- CPAP -- Sitting     Pertinent Labs/Diagnostic Test Results:   10/4 Echo pending. XR chest 1 view portable   ED Interpretation by Gaston Olivarez DO (10/03 8033)   Pulmonary edema and congestion. Cardiomegaly. No focal consolidations, pneumothorax. Final Result by Lucila Brennan MD (10/03 0438)   Evidence of pulmonary edema. Pneumonia is not excluded      Pulmonary edema was noted on the ER preliminary results.                Workstation performed: [id number]           Results from last 7 days   Lab Units 10/03/23  1235   SARS-COV-2  Negative     Results from last 7 days   Lab Units 10/04/23  0649 10/03/23  0801   WBC Thousand/uL 12.01* 19.98*   HEMOGLOBIN g/dL 11.0* 12.1   HEMATOCRIT % 34.6* 38.5 PLATELETS Thousands/uL 230 315   BANDS PCT %  --  5         Results from last 7 days   Lab Units 10/04/23  0649 10/03/23  0801   SODIUM mmol/L 137 137   POTASSIUM mmol/L 3.6 3.7   CHLORIDE mmol/L 101 103   CO2 mmol/L 26 22   ANION GAP mmol/L 10 12   BUN mg/dL 21 18   CREATININE mg/dL 1.14 1.02   EGFR ml/min/1.73sq m 44 50   CALCIUM mg/dL 9.1 9.2   MAGNESIUM mg/dL 2.0 1.6*     Results from last 7 days   Lab Units 10/03/23  0801   AST U/L 14   ALT U/L 9   ALK PHOS U/L 92   TOTAL PROTEIN g/dL 7.0   ALBUMIN g/dL 3.9   TOTAL BILIRUBIN mg/dL 0.76     Results from last 7 days   Lab Units 10/04/23  1107 10/04/23  0648 10/03/23  2119 10/03/23  1704 10/03/23  1303   POC GLUCOSE mg/dl 307* 127 162* 127 166*     Results from last 7 days   Lab Units 10/04/23  0649 10/03/23  0801   GLUCOSE RANDOM mg/dL 120 257*         Results from last 7 days   Lab Units 10/03/23  0801   HEMOGLOBIN A1C % 6.7*   EAG mg/dl 146     Results from last 7 days   Lab Units 10/03/23  1235 10/03/23  1001 10/03/23  0801   HS TNI 0HR ng/L  --   --  156*   HS TNI 2HR ng/L  --  1,611*  --    HSTNI D2 ng/L  --  1,455*  --    HS TNI 4HR ng/L 2,631*  --   --    HSTNI D4 ng/L 2,475*  --   --      Results from last 7 days   Lab Units 10/03/23  0801   TSH 3RD GENERATON uIU/mL 2.622     Results from last 7 days   Lab Units 10/03/23  0801   BNP pg/mL 951*     Results from last 7 days   Lab Units 10/03/23  1235   INFLUENZA A PCR  Negative   INFLUENZA B PCR  Negative   RSV PCR  Negative     ED Treatment:   Medication Administration from 10/03/2023 0742 to 10/03/2023 1249       Date/Time Order Dose Route Action     10/03/2023 0917 EDT furosemide (LASIX) injection 40 mg 40 mg Intravenous Given     10/03/2023 1055 EDT aspirin chewable tablet 324 mg 324 mg Oral Given        Past Medical History:   Diagnosis Date   • Aortic valve stenosis    • Aorto-iliac disease ([address])    • Carotid stenosis    • Cataract of right eye 10/25/2016   • Cataract, left eye 10/18/2016   • Chronic systolic CHF (congestive heart failure) ([facility])    • CKD (chronic kidney disease) stage 3, GFR 30-59 ml/min (HCC)    • Coronary artery disease    • Diabetes mellitus ([facility])     Type 2   • Dyslipidemia    • Gout    • Hyperlipidemia    • Hypertension    • 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   • Irritable bladder    • PAD (peripheral artery disease) ([facility])    • Renal artery stenosis (HCC)     Right     Present on Admission:  • CKD (chronic kidney disease) stage 3, GFR 30-59 ml/min ([facility])  • Coronary artery disease  • Hypertension  • Hyperlipidemia  • Type 2 diabetes mellitus with chronic kidney disease, without long-term current use of insulin, unspecified CKD stage ([facility])      Admitting Diagnosis: Respiratory failure ([facility]) [J96.90]  CHF (congestive heart failure) ([facility]) [I50.9]  Elevated troponin [R79.89]  CHF exacerbation ([facility]) [I50.9]  Age/Sex: 79 y.o. female  Admission Orders:  Scheduled Medications:  amLODIPine, 2.5 mg, Oral, Daily  aspirin, 81 mg, Oral, Daily  [START ON 10/5/2023] atorvastatin, 40 mg, Oral, Daily  clopidogrel, 75 mg, Oral, Daily  enoxaparin, 40 mg, Subcutaneous, Daily  furosemide, 40 mg, Intravenous, BID (diuretic)  insulin lispro, 2-12 Units, Subcutaneous, TID AC  isosorbide mononitrate, 30 mg, Oral, Daily  metoprolol succinate, 200 mg, Oral, Daily      Continuous IV Infusions: none     PRN Meds:  nitroglycerin, 0.4 mg, Sublingual, Q5 Min PRN    scd  IO, daily wts      IP CONSULT TO CARDIOLOGY  IP CONSULT TO CASE MANAGEMENT    Network Utilization Review Department  ATTENTION: Please call with any questions or concerns to [phone] and carefully listen to the prompts so that you are directed to the right person. All voicemails are confidential.   For Discharge needs, contact Care Management DC Support Team at [phone] opt.  2  Send all requests for admission clinical reviews, approved or denied determinations and any other requests to dedicated fax number below belonging to the [facility] where the patient is receiving treatment.  List of dedicated fax numbers for the Facilities:  [place] DENIALS (Administrative/Medical Necessity) [phone]   DISCHARGE SUPPORT TEAM (NETWORK) [address] (Maternity/NICU/Pediatrics) [phone]   [address] 1000 [facility] [phone]   [address] [facility] [address] [facility] [address] [address] [facility] [address] [phone]

## 2023-10-04 NOTE — CASE MANAGEMENT
Case Management Note    Patient name Miguelito Cortez  Location CW2 210/CW2 210- MRN [id number]  : 1940 Date 10/4/2023       Current Admission Date: 10/3/2023  Current Admission Diagnosis:CHF exacerbation ([address])      LOS (days): 0  Geometric Mean LOS (GMLOS) (days):   Days to GMLOS:     OBJECTIVE:   Current admission status: Observation   Primary Insurance: BLUE CROSS [facility]    DISCHARGE DETAILS:  Discharge planning discussed with[de-identified] Patient  Freedom of Choice: Yes  Were Treatment Team discharge recommendations reviewed with patient/caregiver?: Yes  Did patient/caregiver verbalize understanding of patient care needs?: Yes  Were patient/caregiver advised of the risks associated with not following Treatment Team discharge recommendations?: Yes    [address]         Is the patient interested in [facility] at discharge?: Yes  [address] [facility] requested[de-identified] Nursing, Physical [address] [facility] Name[de-identified] [place] Provider[de-identified] PCP  Home Health Services Needed[de-identified] Heart Failure Management, Evaluate Functional Status and Safety, Gait/ADL Training, Strengthening/Theraputic Exercises to Improve Function  Homebound Criteria Met[de-identified] Requires the Assistance of Another Person for Safe Ambulation or to Leave the Home  Supporting Clincal Findings[de-identified] Fatigues Easliy in [employer], Limited Endurance    Treatment Team Recommendation: Home with [address]    Additional Comments: Pt is recommended to have [address] [place] [address] services via a VNA for her aftercare plan. CM spoke to pt about this aftercare recommendation. Pt is agreeable w/ this recommendation. CM provided pt w/ freedom of choice for VNA referrals. Pt requested referral to [facility]. CM made AIDIN referral to [facility]. CM to follow.

## 2023-10-04 NOTE — ASSESSMENT & PLAN NOTE
In January 2017 went from sinus emerita to NSR to Afib RVR (after surgery)  On admission, sinus tach with 1st degree block    Appears to be afib on telemetry, rate controlled irregularly irregular  CHADSVASC score is 7 > 11.2% risk  Per cardiology, assumed to be atrial tach    -Continue toprol 200 mg  -Outpatient cardiology follow up

## 2023-10-04 NOTE — PROGRESS NOTES
-- Patient:  -- MRN: [id number]  -- Aidin Request ID: [id number]  -- Level of care reserved: Patrice Sanz  -- Partner Reserved: [facility], [place],  [address] [phone]  -- Clinical needs requested:  -- Geography searched: [phone]  -- Start of Service:  -- Request sent: 1:53pm EDT on 10/4/2023 by Evette Velázquez  -- Partner reserved: 2:22pm EDT on 10/4/2023 by Evette Velázquez  -- Choice list shared: 2:21pm EDT on 10/4/2023 by Evette Velázquez

## 2023-10-04 NOTE — PLAN OF CARE
Problem: OCCUPATIONAL THERAPY ADULT  Goal: Performs self-care activities at highest level of function for planned discharge setting. See evaluation for individualized goals. Note: Limitation: Decreased ADL status, Decreased endurance, Decreased self-care trans, Decreased high-level ADLs     Assessment: Pt is a 79 y.o. female seen for OT evaluation s/p admit to [address] [place] [address] on 10/3/2023 w/ CHF exacerbation ([address]). Pt admitted via EMS in respiratory distress and with c/o chest pain. Pt now back on room air, sats 94-95%. pt  has a past medical history of Aortic valve stenosis, Aorto-iliac disease ([address]), Carotid stenosis, Cataract of right eye (10/25/2016), Cataract, left eye (71/16/8041), Chronic systolic CHF (congestive heart failure) ([address]), CKD (chronic kidney disease) stage 3, GFR 30-59 ml/min ([address]), Coronary artery disease, Diabetes mellitus ([address]), Dyslipidemia, Gout, Hyperlipidemia, Hypertension, 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[address]), Irritable bladder, PAD (peripheral artery disease) ([address]), and Renal artery stenosis ([address] Personal factors affecting pt at time of IE include:steps to enter environment and difficulty performing IADLS . Prior to admission, pt was living w son in a ML home. Bed/bathroom upstairs. Pt reports being fully independent w self care, mobility, driving etc at baseline. Upon evaluation: Pt requires min/CGA for mobility, close S for LB self care, is independent for UB self care. She does get SOB w min activity 2* the following deficits impacting occupational performance: weakness, decreased strength, decreased balance and decreased tolerance. Pt to benefit from continued skilled OT tx while in the hospital to address deficits as defined above and maximize level of functional independence w ADL's and functional mobility. Occupational Performance areas to address include: bathing/shower, toilet hygiene, dressing, functional mobility and clothing management.   Based on findings from OT evaluation and functional performance deficits, pt has been identified as a  high complexity evaluation. The patient's raw score on the AM-PAC Daily Activity inpatient short form is 21, standardized score is 44.27, greater than 39.4. Patients at this level are likely to benefit from discharge to home. From OT standpoint, recommendation at time of d/c would be home w no ongoing OT needs for home.      OT Discharge Recommendation: No rehabilitation needs

## 2023-10-04 NOTE — OCCUPATIONAL THERAPY NOTE
Occupational Therapy Evaluation      Ervin Hawkins    10/4/2023    Principal Problem:    CHF exacerbation ([address])  Active Problems:    Hyperlipidemia    Hypertension    CKD (chronic kidney disease) stage 3, GFR 30-59 ml/min (HCC)    Coronary artery disease    Atrial fibrillation (HCC)    Type 2 diabetes mellitus with chronic kidney disease, without long-term current use of insulin, unspecified CKD stage (HCC)    Elevated troponin    Respiratory insufficiency      Past Medical History:   Diagnosis Date    Aortic valve stenosis     Aorto-iliac disease (HCC)     Carotid stenosis     Cataract of right eye 10/25/2016    Cataract, left eye 10/18/2016    Chronic systolic CHF (congestive heart failure) (HCC)     CKD (chronic kidney disease) stage 3, GFR 30-59 ml/min (HCC)     Coronary artery disease     Diabetes mellitus (HCC)     Type 2    Dyslipidemia     Gout     Hyperlipidemia     Hypertension     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    Irritable bladder     PAD (peripheral artery disease) ([address])     Renal artery stenosis ([address])     Right       Past Surgical History:   Procedure Laterality Date    ABDOMINAL AORTIC ANEURYSM REPAIR      AORTIC VALVE REPLACEMENT      CAROTID ENDARTERECTOMY Right 2017    CATARACT EXTRACTION       SECTION      CORONARY ARTERY BYPASS GRAFT      CORONARY STENT PLACEMENT      CYSTOSCOPY      HYSTERECTOMY      MAMMO (HISTORICAL)  06/10/2019    VT TEAEC W/PATCH GRF CAROTID VERTB SUBCLAV NECK INC Right 2017    Procedure: CAROTID ENDARTERECTOMY WITH RETROGRADE AND INNOMINATE ARTERY STENTING;  Surgeon: Monica Peña MD;  [facility]: [facility];  Service: Vascular    VT XCAPSL CTRC RMVL INSJ IO LENS PROSTH W/O ECP Right 10/25/2016    Procedure: OD EXTRACTION EXTRACAPSULAR CATARACT PHACO INTRAOCULAR LENS (IOL);   Surgeon: Venus Lundborg, MD;  Location[facility];  Service: Ophthalmology    VT XCAPSL CTRC RMVL INSJ IO LENS PROSTH W/O ECP Left 10/18/2016    Procedure: OS EXTRACTION EXTRACAPSULAR CATARACT PHACO INTRAOCULAR LENS (IOL); Surgeon: Mattie Roberts MD;  Location[facility];  Service: Ophthalmology    VASCULAR SURGERY          10/04/23 0858   OT Last Visit   OT Visit Date 10/04/23   Note Type   Note type Evaluation   Pain Assessment   Pain Assessment Tool 0-10   Pain Score No Pain   Restrictions/Precautions   Weight Bearing Precautions Per Order No   Other Precautions Multiple lines;Telemetry; Fall Risk   Home Living   Type of [address] [place] [address] Center  Multi-level;Bed/bath upstairs   Bathroom Shower/Tub Tub/shower unit   Bathroom Toilet Standard   Bathroom Equipment Grab bars in shower   Prior Function   Level of [place] Independent with ADLs; Independent with functional mobility   Lives With Son   IADLs Independent with driving; Independent with meal prep; Independent with medication management   Falls in the last 6 months 1 to 4   Vocational Retired   Lifestyle   Autonomy pt reports being fully independent w all self care, mobility, home management, driving etc. son lives w her, he works, but is able to assist as needed   Reciprocal Relationships supportive family   Intrinsic Gratification used to like to guzman, likes to do crossword puzzles etc   General   Additional Pertinent History pt reports being relatively healthy and able to care for herself. Subjective   Subjective "I can finally breathe ok"   ADL   Eating Assistance 7  Independent   Grooming Assistance 7  Independent   UB Bathing Assistance 7  Independent   LB Bathing Assistance 5  Supervision/Setup   LB Bathing Deficit Buttocks;Right upper leg;Left upper leg;Right lower leg including foot; Left lower leg including foot   UB Dressing Assistance 5  Supervision/Setup   LB Dressing Assistance 5  Supervision/Setup   Toileting Assistance  4  Minimal Assistance   Transfers   Sit to Stand 5  Supervision   Stand to Sit 5  Supervision   Stand pivot 5  Supervision   Toilet transfer 4  Minimal assistance   Functional Mobility Functional Mobility 5  Supervision   Additional items Rolling walker   Balance   Static Sitting Normal   Dynamic Sitting Good   Static Standing Fair   Dynamic Standing Fair   Activity Tolerance   Activity Tolerance Patient limited by fatigue   RUE Assessment   RUE Assessment WFL   LUE Assessment   LUE Assessment WFL   Hand Function   Gross Motor Coordination Functional   Fine Motor Coordination Functional   Vision - Complex Assessment   Tracking Intact   Psychosocial   Psychosocial (WDL) WDL   Cognition   Overall Cognitive Status WFL   Arousal/Participation Alert; Cooperative   Attention Within functional limits   Orientation Level Oriented X4   Memory Within functional limits   Following Commands Follows multistep commands without difficulty   Assessment   Limitation Decreased ADL status; Decreased endurance;Decreased self-care trans;Decreased high-level ADLs   Assessment Pt is a 79 y.o. female seen for OT evaluation s/p admit to [address] [place] [address] on 10/3/2023 w/ CHF exacerbation ([address]). Pt admitted via EMS in respiratory distress and with c/o chest pain. Pt now back on room air, sats 94-95%. pt  has a past medical history of Aortic valve stenosis, Aorto-iliac disease ([address]), Carotid stenosis, Cataract of right eye (10/25/2016), Cataract, left eye (42/77/0093), Chronic systolic CHF (congestive heart failure) ([address]), CKD (chronic kidney disease) stage 3, GFR 30-59 ml/min ([address]), Coronary artery disease, Diabetes mellitus ([address]), Dyslipidemia, Gout, Hyperlipidemia, Hypertension, 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[address]), Irritable bladder, PAD (peripheral artery disease) ([address]), and Renal artery stenosis ([address]). Personal factors affecting pt at time of IE include:steps to enter environment and difficulty performing IADLS . Prior to admission, pt was living w son in a ML home. Bed/bathroom upstairs. Pt reports being fully independent w self care, mobility, driving etc at baseline.  Upon evaluation: Pt requires min/CGA for mobility, close S for LB self care, is independent for UB self care. She does get SOB w min activity 2* the following deficits impacting occupational performance: weakness, decreased strength, decreased balance and decreased tolerance. Pt to benefit from continued skilled OT tx while in the hospital to address deficits as defined above and maximize level of functional independence w ADL's and functional mobility. Occupational Performance areas to address include: bathing/shower, toilet hygiene, dressing, functional mobility and clothing management. Based on findings from OT evaluation and functional performance deficits, pt has been identified as a  high complexity evaluation. The patient's raw score on the AM-PAC Daily Activity inpatient short form is 21, standardized score is 44.27, greater than 39.4. Patients at this level are likely to benefit from discharge to home. From OT standpoint, recommendation at time of d/c would be home w no ongoing OT needs for home. Goals   Patient Goals to go home   Plan   Treatment Interventions ADL retraining;Functional transfer training; Endurance training;Patient/family training;Equipment evaluation/education; Compensatory technique education; Energy conservation; Activityengagement   Goal Expiration Date 10/18/23   OT Frequency 2-3x/wk   Recommendation   OT Discharge Recommendation No rehabilitation needs   Equipment Recommended Shower/Tub stool (no back) ($)   -PAC Daily Activity Inpatient   Lower Body Dressing 3   Bathing 3   Toileting 3   Upper Body Dressing 4   Grooming 4   Eating 4   Daily Activity Raw Score 21   Daily Activity Standardized Score (Calc for Raw Score >=11) 44.27     OT GOALS TO BE ACHIEVED IN 14 DAYS:    Patient will complete bed mobility mod I  With good safety     Pt will complete UB bathing and dressing independently    Pt will complete LB bathing and dressing independently    Pt will complete toileting w mod I and good safety     Pt will complete functional transfers with S/use of DME as needed demonstrating good safety     Pt will tolerate standing at sinkside x 5 min w F+ balance for increased safety w hygiene    Pt will perform simulated home management activitives w S and good safety     Pt will complete functional mobility in room and bathroom w S and use of most appropriate DME    Pt will demonstrate good ECT/self pacing skills with all self care and functional mobility

## 2023-10-04 NOTE — PLAN OF CARE
Problem: PHYSICAL THERAPY ADULT  Goal: Performs mobility at highest level of function for planned discharge setting. See evaluation for individualized goals. Description: Treatment/Interventions: LE strengthening/ROM, Functional transfer training, Elevations, Therapeutic exercise, Endurance training, Patient/family training, Bed mobility, Equipment eval/education, Gait training          See flowsheet documentation for full assessment, interventions and recommendations. Note: Prognosis: Good  Problem List: Decreased strength, Decreased endurance, Decreased mobility, Impaired balance, Decreased coordination  Assessment: Pt seen for physical therapy evaluation. Pt is an 80 y/o female w/ history/comorbidities of CAD, CABG, HTN, HLD, CHF, PAD, renal artery stenosis, gout, DM II who is now admitted w/ respiratory distress x 1 day- placed on bipap on admit. Dx is CHF exacerbation. Due to acute medical issues, pain, fall risk, note unstable clinical picture. PT consulted to assess mobility, d/c needs. Pt preswnts w/ minimally decreased functional mob, standing balance, endurance, B LE strength, barriers at home. Pt will benefit from skilled PT to correct for the above problems. When stable, recommend d/c home w/ home PT        PT Discharge Recommendation: Home with home health rehabilitation    See flowsheet documentation for full assessment.

## 2023-10-04 NOTE — CASE MANAGEMENT
Case Management Assessment    Patient name Clint Aleman  Location 2 210/CW2 249-23 MRN [id number]  : 1940 Date 10/4/2023       Current Admission Date: 10/3/2023  Current Admission Diagnosis:CHF exacerbation [facility])   Patient Active Problem List    Diagnosis Date Noted   • Elevated troponin 10/03/2023   • CHF exacerbation ([address]) 10/03/2023   • Respiratory insufficiency 10/03/2023   • Type 2 diabetes mellitus with chronic kidney disease, without long-term current use of insulin, unspecified CKD stage ([address]) 2022   • Intermittent claudication ([address]) 2019   • Atrial fibrillation ([address]) 2017   • Non-ST elevation myocardial infarction (NSTEMI), type 2 2017   • Hyperlipidemia    • Hypertension    • CKD (chronic kidney disease) stage 3, GFR 30-59 ml/min ([facility])    • Coronary artery disease    • Demand ischemia of myocardium 2017   • Asymptomatic bilateral carotid artery stenosis 2017   • 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 Advance Directives  Does patient have a [address]?: No  Does patient have Advance Directives?: No  Primary Contact: pt's son Anna Staples / phone# [phone]    Patient Information  Admitted from[de-identified] Home  Mental Status: Alert  Assessment information provided by[de-identified] Patient  Primary Caregiver: Self  County of Residence: [address] [place] [address] do you live in?: [facility] [place] [address] [place] [address] entry access options.  Select all that apply.: Stairs  Number of steps to enter home.: 6  Do the steps have railings?: Yes  Type of Current Residence: 2 story home  Upon entering residence, is there a bedroom on the main floor (no further steps)?: No  A bedroom is located on the following floor levels of residence (select all that apply):: 2nd Floor  Upon entering residence, is there a bathroom on the main floor (no further steps)?: Yes  Number of steps to 2nd floor from main floor: One Flight  In the last 12 months, was there a time when you were not able to pay the mortgage or rent on time?: No  In the last 12 months, how many places have you lived?: 1  In the last 12 months, was there a time when you did not have a steady place to sleep or slept in a shelter (including now)?: No  Homeless/housing insecurity resource given?: N/A  Living Arrangements: Lives w/ Extended Family  Is patient a ?: No    Activities of Daily Living Prior to Admission  Functional Status: Independent  Completes ADLs independently?: Yes  Ambulates independently?: Yes  Does patient use assisted devices?: No  Does patient currently own DME?: No  Does the patient have a history of Short-Term Rehab?: No  Does patient have a history of HHC?: No    Patient Information Continued  Income Source: Pension/long term  Does patient have prescription coverage?: Yes  Within the past 12 months, you worried that your food would run out before you got the money to buy more.: Never true  Within the past 12 months, the food you bought just didn't last and you didn't have money to get more.: Never true  Food insecurity resource given?: N/A  Does patient receive dialysis treatments?: No  Does patient have a history of substance abuse?: No  Does patient have a history of Mental Health Diagnosis?: No    Means of Transportation  Means of Transport to Appts[de-identified] Family transport  In the past 12 months, has lack of transportation kept you from medical appointments or from getting medications?: No  In the past 12 months, has lack of transportation kept you from meetings, work, or from getting things needed for daily living?: No  Was application for public transport provided?: N/A     Additional Comments: CM reviewed d/c planning process including the following: identifying help at home, patient preference for d/c planning needs, availability of treatment team to discuss questions or concerns patient and/or family may have regarding understanding medications and recognizing signs and symptoms once discharged. CM also encouraged patient to follow up with all recommended appointments after discharge. Patient advised of importance for patient and family to participate in managing patient’s medical well being. Patient/caregiver received discharge checklist. Content reviewed. Patient/caregiver encouraged to participate in discharge plan of care prior to discharge home.

## 2023-10-04 NOTE — PROGRESS NOTES
Cardiology Team Progress Note - Anthony Reyes 79 y.o. female MRN: [id number]    Unit/Bed#: CW2 210-01 Encounter: [id number]          Subjective:   Patient seen and examined. No significant events overnight. Denies lightheadedness, dizziness, syncope, headache, vision changes, diaphoresis, chest pain, palpitations, shortness of breath, PND, orthopnea, nausea, vomiting, abdominal pain or lower extremity edema. She was able to walk with assistance to the window down the mittal without oxygen and is comfortable sitting up without oxygen. She states that she slept well and is urinating frequently. Hospital Course:   Anthony Reyes is a 79y.o. year old female with a history of CAD w/CABG in 1996, AVR with bioprosthetic stent in 2011, bilateral carotid artery stneosis s/p CEA and R innominate artery stent, HLD, and HTN. On presentation to the ED, she was on CPAP with 88% oxygen saturation, she was then transitioned to BiPAP with 100% saturation. She was given a dose of IV lasix 40 mg with good output and the patient noted improvement of symptoms. She was titrated to 4L NC. Additionally, they obtained an EKG which showed sinus tachycardia with right axis deviation. She had uptrending troponins upto 2,631 and BNP of 951. She was determined to have acute heart failure. She did report having an intermittent cough with yellow sputum production. A viral respiratory panel with COVID/FLU/RSV was negative. She was managed for acute heart failure with lasix 40 mg BID with ECHO pending and suspected troponin elevation 2/2 demand ischemia. Vitals: Blood pressure 164/66, pulse (!) 113, temperature 98.2 °F (36.8 °C), resp. rate 19, weight 82.1 kg (181 lb), SpO2 94 %. , Body mass index is 32.06 kg/m².,   Orthostatic Blood Pressures    Flowsheet Row Most Recent Value   Blood Pressure 164/66 filed at 10/04/2023 0705   Patient Position - Orthostatic VS Lying filed at 10/03/2023 2303            Intake/Output Summary (Last 24 hours) at 10/4/2023 0758  Last data filed at 10/4/2023 0119  Gross per 24 hour   Intake --   Output 2025 ml   Net -2025 ml       Review of System:  Review of system was conducted and was negative except for as stated in the subjective course.     Physical Exam:    GEN: O[place] appears well, alert and oriented x 3, pleasant and cooperative   HEENT:  Normocephalic, atraumatic, anicteric, moist mucous membranes  NECK: No JVD or carotid bruits   HEART: Irregular rhythm, regular rate, normal S1 and S2, no murmurs, clicks, gallops or rubs   LUNGS: mild bibasilar rales; no wheezes or rhonchi; respiration nonlabored   ABDOMEN:  Normoactive bowel sounds, soft, no tenderness, no distention  EXTREMITIES: peripheral pulses palpable; no edema  NEURO: no gross focal findings; cranial nerves grossly intact   SKIN:  Dry, intact, warm to touch      Current Facility-Administered Medications:   •  amLODIPine (NORVASC) tablet 2.5 mg, 2.5 mg, Oral, Daily, Het OTIS Mcadams, DO  •  aspirin chewable tablet 81 mg, 81 mg, Oral, Daily, Het OTIS Mcadams, DO  •  atorvastatin (LIPITOR) tablet 20 mg, 20 mg, Oral, Daily, Het OTIS Mcadams, DO  •  clopidogrel (PLAVIX) tablet 75 mg, 75 mg, Oral, Daily, Het OTIS Mosleyel, DO  •  enoxaparin (LOVENOX) subcutaneous injection 40 mg, 40 mg, Subcutaneous, Daily, Het OTIS Mcadams, DO, 40 mg at 10/03/23 1652  •  furosemide (LASIX) injection 40 mg, 40 mg, Intravenous, BID (diuretic), [facility] Rambo, DO, 40 mg at 10/03/23 1652  •  insulin lispro (HumaLOG) 100 units/mL subcutaneous injection 2-12 Units, 2-12 Units, Subcutaneous, TID AC **AND** Fingerstick Glucose (POCT), , , TID AC, Het OTIS Mcadams, DO  •  isosorbide mononitrate (IMDUR) 24 hr tablet 30 mg, 30 mg, Oral, Daily, Abhi Bennett, DO  •  metoprolol succinate (TOPROL-XL) 24 hr tablet 200 mg, 200 mg, Oral, Daily, Het OTIS Mcadams, DO  •  nitroglycerin (NITROSTAT) SL tablet 0.4 mg, 0.4 mg, Sublingual, Q5 Min PRN, Het OTIS Mcadams DO    Labs & Results:  Results from last 7 days   Lab Units 10/03/23  1235 10/03/23  1001 10/03/23  0801   HS TNI 0HR ng/L  --   --  156*   HS TNI 2HR ng/L  --  1,611*  --    HS TNI 4HR ng/L 2,631*  --   --          Results from last 7 days   Lab Units 10/04/23  0649 10/03/23  0801   POTASSIUM mmol/L 3.6 3.7   CO2 mmol/L 26 22   CHLORIDE mmol/L 101 103   BUN mg/dL 21 18   CREATININE mg/dL 1.14 1.02     Results from last 7 days   Lab Units 10/04/23  0649 10/03/23  0801   HEMOGLOBIN g/dL 11.0* 12.1   HEMATOCRIT % 34.6* 38.5   PLATELETS Thousands/uL 230 315     Results from last 7 days   Lab Units 10/03/23  0801   TRIGLYCERIDES mg/dL 133   HDL mg/dL 43*   LDL CALC mg/dL 97   HEMOGLOBIN A1C % 6.7*       Telemetry:   Personally reviewed by Demar Clear: atrial tachycardia    Imaging:   Study: TTE  Result Date: 10/4/23  Findings:   Left Ventricle: Left ventricular cavity size is normal. There is moderate to severe asymmetric hypertrophy of the septal wall. The left ventricular ejection fraction is 55%. Systolic function is normal. Diastolic function is moderately abnormal, consistent with grade II (pseudonormal) relaxation. •  The following segments are akinetic: basal inferior and mid inferolateral.  •  The following segments are hypokinetic: basal inferolateral, basal anterolateral, mid anterolateral and apical lateral.  •  All other segments are normal.  •  Left Atrium: The atrium is mildly dilated. •  Aortic Valve: There is trace regurgitation. •  Mitral Valve: There is moderate annular calcification. There is mild to moderate regurgitation. •  Tricuspid Valve:  There is mild to moderate regurgitation          Assessment:  Principal Problem:    CHF exacerbation ([facility])  Active Problems:    Hyperlipidemia    Hypertension    CKD (chronic kidney disease) stage 3, GFR 30-59 ml/min ([facility])    Coronary artery disease    Type 2 diabetes mellitus with chronic kidney disease, without long-term current use of insulin, unspecified CKD stage ([facility])    Elevated troponin    Respiratory insufficiency      1. Acute Heart Failure 2/2 concentric hypertrophy   - Compared to prior ECHO in 2017, the LVEF is similar to past now 55% was 65%, she has had no change in MR and VR. There appears to be akinetic movements of the basal inferior and mid inferolateral as well as hypokinetic segments including basal inferolateral, basal anterolateral, mid anterolateral, and apical lateral.  Additionally, there is now moderate-to-severe asymmetric hypertrophy of septal wall. - this episode is still remains unclear for precipitating factor given poor history, but she appears to have diastolic heart failure. - Plan to transition to lasix 40 mg PO    2. CAD with history of CABG in 1996 and stent of large obtuse marginal vein graft (in 2012)  - Troponin elevations likely related to demand ischemia in known CAD   - Will consider nuclear stress testing  - Imdur 30 mg started as patient's BP allows  - Plan for follow-up with cardiology in outpatient setting. 3. Aortic Valve Replacement with Bioprosthetic in 2011/2012  - trace regurgitation, otherwise functioning well    4. Bilateral carotid stenosis with Right CEA and stenting of the innominate artery    - Continue ASA 81 mg and Plavix 75 mg  5. HTN  - Continue norvasc 2.5 mg  -  Started on Imdur 30 mg qd, will continue to monitor Bps    6.  HLD  - Continue lipitor 20 mg      Anjel Fajardo  MS-4

## 2023-10-04 NOTE — PROGRESS NOTES
INTERNAL MEDICINE RESIDENCY PROGRESS NOTE     Name: Anthony Crew   Age & Sex: 79 y.o. female   MRN: [id number]  Unit/Bed#: CW2 210-01   Encounter: [id number]  Team: SOD Team B     PATIENT INFORMATION     Name: Anthony Crew   Age & Sex: 79 y.o. female   MRN: [id number]  Hospital Stay Days: 0    ASSESSMENT/PLAN     Principal Problem:    CHF exacerbation ([address])  Active Problems:    Atrial fibrillation ([address])    Hyperlipidemia    Hypertension    CKD (chronic kidney disease) stage 3, GFR 30-59 ml/min ([address])    Coronary artery disease    Type 2 diabetes mellitus with chronic kidney disease, without long-term current use of insulin, unspecified CKD stage (HCC)    Elevated troponin    Respiratory insufficiency      * CHF exacerbation (HCC)  Assessment & Plan  Wt Readings from Last 3 Encounters:   05/22/23 83.3 kg (183 lb 9.6 oz)   01/20/23 82.6 kg (182 lb)   07/05/22 85.7 kg (189 lb)     Patient arriving with respiratory distress, eventually resolving with a short period of BIPAP now weaned to 4 L/min NC    Labs notable for elevated , no baseline to compare to. Elevated WBC 19.9k    Magnesium 1.6  At her baseline, she is able to walk up two flights of stairs without becoming short of breath. History of CAD with most recent Echo in 2017, EF 65% and consistent with a pseudonormal left ventricular filling pattern, with concomitant abnormal relaxation and increased filling pressure (grade 2 diastolic dysfunction). The left atrium was mildly to moderately dilated. Her bioprosthesis aortic valve exhibited normal function. There was moderate tricuspid valve regurgitation. Estimated peak PA pressure was 55 mmHg. The findings suggest moderate pulmonary hypertension.     Plan:  -f/u with repeat Echo  -Consult Cardiology for CHF exacerbation and elevated troponins, appreciate recs  -Lasix 40 MG IV BID  -Replete magnesium   -prn nitroglycerin  -normal TSH  -Cardiac diet        Atrial fibrillation ([address])  Assessment & Plan  In January 2017 went from sinus emerita to NSR to Afib RVR  On admission, sinus tach with 1st degree block    Appears to be afib on telemetry, rate controlled    -Continue toprol 200 mg  -Continue tele  -On Lovenox but will likely need [facility] for afib    Respiratory insufficiency  Assessment & Plan  Patient presenting in likely CHF exacerbation. Previously weaned off BIPAP to NC. Complaining yesterday of increased yellow sputum associated with cough. COVID/Flu panel negative. No recent sick contacts or illness. Elevated WBC 19.9k, no fever or other symptoms to suggest infection    Chest x-ray: Mixed interstitial and airspace opacities are worse than the prior study as well as pulmonary vascular congestion. No pneumothorax. Haziness at the bases may represent trace effusions. OFF OXYGEN    Plan:  -Continue to trend WBC  -Monitor for any signs of infection      Elevated troponin  Assessment & Plan  This admission: Initial troponin 156, 2hr troponin 1611, 4hr troponin 2631 in the setting of CHF exacerbation vs NSTEMI  S/p one dose of aspirin 324 mg      Plan:  -Continue aspirin, plavix, lipitor  -PRN nitroglycerin sl 0.4 mg  -Consult Cardiology for elevated troponins, appreciate recs      Type 2 diabetes mellitus with chronic kidney disease, without long-term current use of insulin, unspecified CKD stage [facility])  Assessment & Plan  Lab Results   Component Value Date    HGBA1C 6.7 (H) 10/03/2023       Recent Labs     10/03/23  1303 10/03/23  1704 10/03/23  2119 10/04/23  0648   POCGLU 166* 127 162* 127       Blood Sugar Average: Last 72 hrs:  (P) 145.5     History of T2DM, at home takes metformin 500 mg QD    Plan:  -f/u with repeat hemoglobin A1c  -SSI, optimize glycemic control       Coronary artery disease  Assessment & Plan  History of CAD s/p CABG '96, PCI/stent '12, 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 Currently taking 81 mg aspirin QD and 75 mg plavix QD at home.      Prior to admission, patient reporting chest pain 5/10 in severity that is now resolved. S/p one dose of 324 mg aspirin in the ED    Initial troponin 156, 2hr troponin 1611, 4hr troponin 2631  NSTEMI VS CHF      Plan:  -Continue aspirin, plavix, lipitor  -PRN nitroglycerin sl 0.4 mg  -Continue with tele  -Consult Cardiology for elevated troponins, appreciate recs      CKD (chronic kidney disease) stage 3, GFR 30-59 ml/min [facility])  Assessment & Plan  Lab Results   Component Value Date    EGFR 44 10/04/2023    EGFR 50 10/03/2023    EGFR 52.0 2017    CREATININE 1.14 10/04/2023    CREATININE 1.02 10/03/2023    CREATININE 1.03 2017     Known history of CKD 3 with T2DM, baseline creatinine around 1.0-1.1    Plan:  -Continue to monitor renal function with BMPs while diuresing         Hypertension  Assessment & Plan  History of hypertension, home meds include amlodipine 2.5 QD and toprol  mg QD    Plan:  -Continue home meds amlodipine 2.5 mg QD and toprol  mg QD    Hyperlipidemia  Assessment & Plan  History of hyperlipidemia and CAD. Last lipid panel in  remarkable for slightly elevated triglycerides. Currently takes 20 mg lipitor QD at home    Plan:  -Increase to Lipitor 40mg       Disposition: pending optimization     SUBJECTIVE     Patient seen and examined. No acute events overnight. No longer has chest pain or SOB. Feeling better and walked without oxygen. 2L output in last 24 hrs, weight stable.     OBJECTIVE     Vitals:    10/03/23 2305 10/04/23 0600 10/04/23 0705 10/04/23 1031   BP: 162/59  164/66 137/64   BP Location: Left arm   Left arm   Pulse: 69  (!) 113 (!) 110   Resp:   19 19   Temp: 98.3 °F (36.8 °C)  98.2 °F (36.8 °C) 98.1 °F (36.7 °C)   TempSrc: Oral   Oral   SpO2: 94%  94% 94%   Weight:  82.1 kg (181 lb)        Temperature:   Temp (24hrs), Av.2 °F (36.8 °C), Min:98 °F (36.7 °C), Max:98.6 °F (37 °C)    Temperature: 98.1 °F (36.7 °C)  Intake & Output:  I/O       10/02 0701  10/03 0700 10/03 0701  10/04 0700 10/04 0701  10/05 0700    P. O.   120    Total Intake(mL/kg)   120 (1.5)    Urine (mL/kg/hr)  2025 200 (0.6)    Total Output  2025 200    Net  -2025 -80           Unmeasured Urine Occurrence  1 x 1 x        Weights:        Body mass index is 32.06 kg/m². Weight (last 2 days)     Date/Time Weight    10/04/23 0600 82.1 (181)    10/03/23 1457 82.3 (181.4)    10/03/23 1142 82.3 (181.4)        Physical Exam  Vitals reviewed. Constitutional:       General: She is not in acute distress. HENT:      Head: Normocephalic and atraumatic. Eyes:      Conjunctiva/sclera: Conjunctivae normal.   Cardiovascular:      Rate and Rhythm: Normal rate. Rhythm irregular. Pulses: Normal pulses. Heart sounds: Normal heart sounds. Pulmonary:      Effort: Pulmonary effort is normal.      Breath sounds: Normal breath sounds. Abdominal:      Palpations: Abdomen is soft. Tenderness: There is no abdominal tenderness. Musculoskeletal:      Right lower leg: Edema present. Left lower leg: Edema present. Neurological:      Mental Status: She is alert and oriented to person, place, and time. LABORATORY DATA     Labs: I have personally reviewed pertinent reports. Results from last 7 days   Lab Units 10/04/23  0649 10/03/23  0801   WBC Thousand/uL 12.01* 19.98*   HEMOGLOBIN g/dL 11.0* 12.1   HEMATOCRIT % 34.6* 38.5   PLATELETS Thousands/uL 230 315   MONO PCT %  --  5   EOS PCT %  --  5      Results from last 7 days   Lab Units 10/04/23  0649 10/03/23  0801   POTASSIUM mmol/L 3.6 3.7   CHLORIDE mmol/L 101 103   CO2 mmol/L 26 22   BUN mg/dL 21 18   CREATININE mg/dL 1.14 1.02   CALCIUM mg/dL 9.1 9.2   ALK PHOS U/L  --  92   ALT U/L  --  9   AST U/L  --  14     Results from last 7 days   Lab Units 10/04/23  0649 10/03/23  0801   MAGNESIUM mg/dL 2.0 1.6*                        IMAGING & DIAGNOSTIC TESTING     Radiology Results: I have personally reviewed pertinent reports.   XR chest 1 view portable    Result Date: 10/3/2023  Impression: Evidence of pulmonary edema. Pneumonia is not excluded Pulmonary edema was noted on the ER preliminary results. Workstation performed: [id number]     Other Diagnostic Testing: I have personally reviewed pertinent reports. ACTIVE MEDICATIONS     Current Facility-Administered Medications   Medication Dose Route Frequency   • amLODIPine (NORVASC) tablet 2.5 mg  2.5 mg Oral Daily   • aspirin chewable tablet 81 mg  81 mg Oral Daily   • [START ON 10/5/2023] atorvastatin (LIPITOR) tablet 40 mg  40 mg Oral Daily   • clopidogrel (PLAVIX) tablet 75 mg  75 mg Oral Daily   • enoxaparin (LOVENOX) subcutaneous injection 40 mg  40 mg Subcutaneous Daily   • furosemide (LASIX) injection 40 mg  40 mg Intravenous BID (diuretic)   • insulin lispro (HumaLOG) 100 units/mL subcutaneous injection 2-12 Units  2-12 Units Subcutaneous TID AC   • isosorbide mononitrate (IMDUR) 24 hr tablet 30 mg  30 mg Oral Daily   • metoprolol succinate (TOPROL-XL) 24 hr tablet 200 mg  200 mg Oral Daily   • nitroglycerin (NITROSTAT) SL tablet 0.4 mg  0.4 mg Sublingual Q5 Min PRN       VTE Pharmacologic Prophylaxis: Enoxaparin (Lovenox)  VTE Mechanical Prophylaxis: sequential compression device    Portions of the record may have been created with voice recognition software. Occasional wrong word or "sound a like" substitutions may have occurred due to the inherent limitations of voice recognition software.   Read the chart carefully and recognize, using context, where substitutions have occurred.  ==  Claudine Oliveira MD  [facility]  Internal Medicine Residency PGY-2

## 2023-10-05 ENCOUNTER — HOME CARE VISIT (OUTPATIENT)
Dept: HOME HEALTH SERVICES | Facility: HOME HEALTHCARE | Age: 83
End: 2023-10-05

## 2023-10-05 VITALS
TEMPERATURE: 98.1 F | RESPIRATION RATE: 18 BRPM | WEIGHT: 178.13 LBS | OXYGEN SATURATION: 95 % | DIASTOLIC BLOOD PRESSURE: 64 MMHG | BODY MASS INDEX: 31.56 KG/M2 | SYSTOLIC BLOOD PRESSURE: 121 MMHG | HEART RATE: 71 BPM | HEIGHT: 63 IN

## 2023-10-05 LAB
ANION GAP SERPL CALCULATED.3IONS-SCNC: 9 MMOL/L
ATRIAL RATE: 107 BPM
BASOPHILS # BLD AUTO: 0.08 THOUSANDS/ÂΜL (ref 0–0.1)
BASOPHILS NFR BLD AUTO: 1 % (ref 0–1)
BUN SERPL-MCNC: 25 MG/DL (ref 5–25)
CALCIUM SERPL-MCNC: 9 MG/DL (ref 8.4–10.2)
CHLORIDE SERPL-SCNC: 99 MMOL/L (ref 96–108)
CO2 SERPL-SCNC: 30 MMOL/L (ref 21–32)
CREAT SERPL-MCNC: 1.25 MG/DL (ref 0.6–1.3)
EOSINOPHIL # BLD AUTO: 0.39 THOUSAND/ÂΜL (ref 0–0.61)
EOSINOPHIL NFR BLD AUTO: 4 % (ref 0–6)
ERYTHROCYTE [DISTWIDTH] IN BLOOD BY AUTOMATED COUNT: 14.6 % (ref 11.6–15.1)
GFR SERPL CREATININE-BSD FRML MDRD: 39 ML/MIN/1.73SQ M
GLUCOSE SERPL-MCNC: 113 MG/DL (ref 65–140)
GLUCOSE SERPL-MCNC: 122 MG/DL (ref 65–140)
GLUCOSE SERPL-MCNC: 150 MG/DL (ref 65–140)
HCT VFR BLD AUTO: 33.9 % (ref 34.8–46.1)
HGB BLD-MCNC: 10.8 G/DL (ref 11.5–15.4)
IMM GRANULOCYTES # BLD AUTO: 0.03 THOUSAND/UL (ref 0–0.2)
IMM GRANULOCYTES NFR BLD AUTO: 0 % (ref 0–2)
LYMPHOCYTES # BLD AUTO: 2.65 THOUSANDS/ÂΜL (ref 0.6–4.47)
LYMPHOCYTES NFR BLD AUTO: 26 % (ref 14–44)
MCH RBC QN AUTO: 28.6 PG (ref 26.8–34.3)
MCHC RBC AUTO-ENTMCNC: 31.9 G/DL (ref 31.4–37.4)
MCV RBC AUTO: 90 FL (ref 82–98)
MONOCYTES # BLD AUTO: 0.7 THOUSAND/ÂΜL (ref 0.17–1.22)
MONOCYTES NFR BLD AUTO: 7 % (ref 4–12)
NEUTROPHILS # BLD AUTO: 6.37 THOUSANDS/ÂΜL (ref 1.85–7.62)
NEUTS SEG NFR BLD AUTO: 62 % (ref 43–75)
NRBC BLD AUTO-RTO: 0 /100 WBCS
P AXIS: 68 DEGREES
PLATELET # BLD AUTO: 238 THOUSANDS/UL (ref 149–390)
PMV BLD AUTO: 10.3 FL (ref 8.9–12.7)
POTASSIUM SERPL-SCNC: 3.6 MMOL/L (ref 3.5–5.3)
PR INTERVAL: 224 MS
QRS AXIS: 144 DEGREES
QRSD INTERVAL: 122 MS
QT INTERVAL: 350 MS
QTC INTERVAL: 467 MS
RBC # BLD AUTO: 3.77 MILLION/UL (ref 3.81–5.12)
SODIUM SERPL-SCNC: 138 MMOL/L (ref 135–147)
T WAVE AXIS: 48 DEGREES
VENTRICULAR RATE: 107 BPM
WBC # BLD AUTO: 10.22 THOUSAND/UL (ref 4.31–10.16)

## 2023-10-05 PROCEDURE — 99238 HOSP IP/OBS DSCHRG MGMT 30/<: CPT | Performed by: INTERNAL MEDICINE

## 2023-10-05 PROCEDURE — NC001 PR NO CHARGE: Performed by: INTERNAL MEDICINE

## 2023-10-05 PROCEDURE — 80048 BASIC METABOLIC PNL TOTAL CA: CPT | Performed by: STUDENT IN AN ORGANIZED HEALTH CARE EDUCATION/TRAINING PROGRAM

## 2023-10-05 PROCEDURE — 93010 ELECTROCARDIOGRAM REPORT: CPT | Performed by: INTERNAL MEDICINE

## 2023-10-05 PROCEDURE — 97535 SELF CARE MNGMENT TRAINING: CPT

## 2023-10-05 PROCEDURE — 85025 COMPLETE CBC W/AUTO DIFF WBC: CPT | Performed by: STUDENT IN AN ORGANIZED HEALTH CARE EDUCATION/TRAINING PROGRAM

## 2023-10-05 PROCEDURE — 82948 REAGENT STRIP/BLOOD GLUCOSE: CPT

## 2023-10-05 PROCEDURE — 99232 SBSQ HOSP IP/OBS MODERATE 35: CPT | Performed by: INTERNAL MEDICINE

## 2023-10-05 RX ORDER — FUROSEMIDE 40 MG/1
40 TABLET ORAL
Status: DISCONTINUED | OUTPATIENT
Start: 2023-10-05 | End: 2023-10-05 | Stop reason: HOSPADM

## 2023-10-05 RX ORDER — ATORVASTATIN CALCIUM 40 MG/1
40 TABLET, FILM COATED ORAL DAILY
Qty: 30 TABLET | Refills: 0 | Status: SHIPPED | OUTPATIENT
Start: 2023-10-06

## 2023-10-05 RX ORDER — ISOSORBIDE MONONITRATE 30 MG/1
30 TABLET, EXTENDED RELEASE ORAL DAILY
Qty: 30 TABLET | Refills: 0 | Status: SHIPPED | OUTPATIENT
Start: 2023-10-06

## 2023-10-05 RX ORDER — FUROSEMIDE 40 MG/1
40 TABLET ORAL DAILY
Qty: 30 TABLET | Refills: 0 | Status: SHIPPED | OUTPATIENT
Start: 2023-10-05

## 2023-10-05 RX ADMIN — CLOPIDOGREL BISULFATE 75 MG: 75 TABLET ORAL at 08:48

## 2023-10-05 RX ADMIN — FUROSEMIDE 40 MG: 40 TABLET ORAL at 08:48

## 2023-10-05 RX ADMIN — ISOSORBIDE MONONITRATE 30 MG: 30 TABLET, EXTENDED RELEASE ORAL at 08:49

## 2023-10-05 RX ADMIN — ATORVASTATIN CALCIUM 40 MG: 40 TABLET, FILM COATED ORAL at 08:49

## 2023-10-05 RX ADMIN — INSULIN LISPRO 2 UNITS: 100 INJECTION, SOLUTION INTRAVENOUS; SUBCUTANEOUS at 12:14

## 2023-10-05 RX ADMIN — ASPIRIN 81 MG CHEWABLE TABLET 81 MG: 81 TABLET CHEWABLE at 08:48

## 2023-10-05 RX ADMIN — METOPROLOL SUCCINATE 200 MG: 100 TABLET, EXTENDED RELEASE ORAL at 08:48

## 2023-10-05 RX ADMIN — ENOXAPARIN SODIUM 40 MG: 40 INJECTION SUBCUTANEOUS at 08:49

## 2023-10-05 RX ADMIN — AMLODIPINE BESYLATE 2.5 MG: 2.5 TABLET ORAL at 08:48

## 2023-10-05 NOTE — PROGRESS NOTES
INTERNAL MEDICINE RESIDENCY PROGRESS NOTE     Name: Niyah Kaye   Age & Sex: 79 y.o. female   MRN: [id number]  Unit/Bed#: [address] 507-01   Encounter: [id number]  Team: SOD Team B     PATIENT INFORMATION     Name: Niyah Kaye   Age & Sex: 79 y.o. female   MRN: [id number]  Hospital Stay Days: 1    ASSESSMENT/PLAN     Principal Problem:    CHF exacerbation ([address])  Active Problems:    Atrial fibrillation ([address])    Hyperlipidemia    Hypertension    CKD (chronic kidney disease) stage 3, GFR 30-59 ml/min ([address])    Coronary artery disease    Type 2 diabetes mellitus with chronic kidney disease, without long-term current use of insulin, unspecified CKD stage (HCC)    Elevated troponin    Respiratory insufficiency      * CHF exacerbation (HCC)  Assessment & Plan  Wt Readings from Last 3 Encounters:   10/05/23 80.8 kg (178 lb 2.1 oz)   05/22/23 83.3 kg (183 lb 9.6 oz)   01/20/23 82.6 kg (182 lb)     Patient arriving with respiratory distress, eventually resolving with a short period of BIPAP now weaned to 4 L/min NC    Labs notable for elevated , no baseline to compare to. Elevated WBC 19.9k    Magnesium 1.6  At her baseline, she is able to walk up two flights of stairs without becoming short of breath. History of CAD with most recent Echo in 2017, EF 65% and consistent with a pseudonormal left ventricular filling pattern, with concomitant abnormal relaxation and increased filling pressure (grade 2 diastolic dysfunction). The left atrium was mildly to moderately dilated. Her bioprosthesis aortic valve exhibited normal function. There was moderate tricuspid valve regurgitation. Estimated peak PA pressure was 55 mmHg. The findings suggest moderate pulmonary hypertension. 1.1L urine output in past 24, 3 lbs weight loss  Echo-  moderate to severe asymmetric hypertrophy of the septal wall. The left ventricular ejection fraction is 55%.  Systolic function is normal. Diastolic function is moderately abnormal, consistent with grade II (pseudonormal) relaxation. •  The following segments are akinetic: basal inferior and mid inferolateral.      Plan:    -Consult Cardiology for CHF exacerbation and elevated troponins, appreciate recs  -Lasix 40 MG PO BID, appreciate diuresis reccs  -Replete magnesium   -prn nitroglycerin  -normal TSH  -Cardiac diet        Atrial fibrillation [facility])  Assessment & Plan  In January 2017 went from sinus emerita to NSR to Afib RVR  On admission, sinus tach with 1st degree block    Appears to be afib on telemetry, rate controlled irregularly irregular  CHADSVASC score is 7 > 11.2% risk      -Continue toprol 200 mg  -Continue tele  -On Lovenox but will likely need AC for afib, appreciate cardiology reccs for [facility]    Respiratory insufficiency  Assessment & Plan  Patient presenting in likely CHF exacerbation. Previously weaned off BIPAP to NC. Complaining yesterday of increased yellow sputum associated with cough. COVID/Flu panel negative. No recent sick contacts or illness. Elevated WBC 19.9k, no fever or other symptoms to suggest infection    Chest x-ray: Mixed interstitial and airspace opacities are worse than the prior study as well as pulmonary vascular congestion. No pneumothorax. Haziness at the bases may represent trace effusions.   OFF OXYGEN     Plan:  -Continue to trend WBC  -Monitor for any signs of infection      Elevated troponin  Assessment & Plan  This admission: Initial troponin 156, 2hr troponin 1611, 4hr troponin 2631 in the setting of CHF exacerbation vs NSTEMI  S/p one dose of aspirin 324 mg      Plan:  -Continue aspirin, plavix, lipitor  -PRN nitroglycerin sl 0.4 mg  -Consult Cardiology for elevated troponins, appreciate recs      Type 2 diabetes mellitus with chronic kidney disease, without long-term current use of insulin, unspecified CKD stage [facility])  Assessment & Plan  Lab Results   Component Value Date    HGBA1C 6.7 (H) 10/03/2023       Recent Labs     10/04/23  5083 10/04/23  2056 10/05/23  0708 10/05/23  1116   POCGLU 126 163* 122 150*       Blood Sugar Average: Last 72 hrs:  (P) [id number]     History of T2DM, at home takes metformin 500 mg QD    Plan:  -f/u with repeat hemoglobin A1c  -SSI, optimize glycemic control       Coronary artery disease  Assessment & Plan  History of CAD s/p CABG '96, PCI/stent '12, 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 Currently taking 81 mg aspirin QD and 75 mg plavix QD at home. Prior to admission, patient reporting chest pain 5/10 in severity that is now resolved. S/p one dose of 324 mg aspirin in the ED    Initial troponin 156, 2hr troponin 1611, 4hr troponin 2631  NSTEMI VS CHF      Plan:  -Continue aspirin, plavix, lipitor  -PRN nitroglycerin sl 0.4 mg  -Continue with tele  -Consult Cardiology for elevated troponins, appreciate recs      CKD (chronic kidney disease) stage 3, GFR 30-59 ml/min [facility])  Assessment & Plan  Lab Results   Component Value Date    EGFR 39 10/05/2023    EGFR 44 10/04/2023    EGFR 50 10/03/2023    CREATININE 1.25 10/05/2023    CREATININE 1.14 10/04/2023    CREATININE 1.02 10/03/2023     Known history of CKD 3 with T2DM, baseline creatinine around 1.0-1.1  Upward trending Cr likely due to diuretics    Plan:  -Continue to monitor renal function with BMPs while diuresing           Hypertension  Assessment & Plan  History of hypertension, home meds include amlodipine 2.5 QD and toprol  mg QD    Plan:  -Continue home meds amlodipine 2.5 mg QD and toprol  mg QD    Hyperlipidemia  Assessment & Plan  History of hyperlipidemia and CAD. Last lipid panel in 2020 remarkable for slightly elevated triglycerides. Currently takes 20 mg lipitor QD at home    Plan:  -Increase to Lipitor 40mg       Disposition: Pending cards reccs     SUBJECTIVE     Patient seen and examined. No acute events overnight. Resting comfortably, breathing well on RA. Denies chest pain. Tolerated PT and diet.      OBJECTIVE     Vitals: 10/04/23 2249 10/05/23 0532 10/05/23 0755 10/05/23 1118   BP: 150/60  143/50 121/64   BP Location:   Left arm    Pulse: 75  74 71   Resp:   18    Temp:   98.1 °F (36.7 °C)    TempSrc:   Oral    SpO2: 92%  90% 95%   Weight:  80.8 kg (178 lb 2.1 oz)     Height:          Temperature:   Temp (24hrs), Av °F (36.7 °C), Min:97.9 °F (36.6 °C), Max:98.1 °F (36.7 °C)    Temperature: 98.1 °F (36.7 °C)  Intake & Output:  I/O       10/03 0701  10/04 0700 10/04 0701  10/05 0700 10/05 0701  10/06 0700    P. O.  360 0    Total Intake(mL/kg)  360 (4.5) 0 (0)    Urine (mL/kg/hr)  1116 (0.6) 1150 (3)    Total Output  1116 1150    Net - -756 -1150           Unmeasured Urine Occurrence 1 x 2 x         Weights:   IBW (Ideal Body Weight): 52.4 kg    Body mass index is 31.55 kg/m². Weight (last 2 days)     Date/Time Weight    10/05/23 0532 80.8 (178.13)    10/04/23 1230 82.1 (181)    10/04/23 0600 82.1 (181)    10/03/23 1457 82.3 (181.4)    10/03/23 1142 82.3 (181.4)        Physical Exam  Vitals reviewed. Constitutional:       General: She is not in acute distress. HENT:      Head: Normocephalic and atraumatic. Eyes:      Conjunctiva/sclera: Conjunctivae normal.   Cardiovascular:      Rate and Rhythm: Normal rate. Rhythm irregular. Pulses: Normal pulses. Heart sounds: Normal heart sounds. Pulmonary:      Effort: Pulmonary effort is normal.      Breath sounds: Normal breath sounds. Abdominal:      Palpations: Abdomen is soft. Tenderness: There is no abdominal tenderness. Musculoskeletal:         General: Normal range of motion. Right lower leg: Edema present. Left lower leg: Edema present. Neurological:      Mental Status: She is alert and oriented to person, place, and time. LABORATORY DATA     Labs: I have personally reviewed pertinent reports.   Results from last 7 days   Lab Units 10/05/23  0434 10/04/23  0649 10/03/23  0801   WBC Thousand/uL 10.22* 12.01* 19.98*   HEMOGLOBIN g/dL 10.8* 11.0* 12.1   HEMATOCRIT % 33.9* 34.6* 38.5   PLATELETS Thousands/uL 238 230 315   NEUTROS PCT % 62  --   --    MONOS PCT % 7  --   --    MONO PCT %  --   --  5   EOS PCT % 4  --  5      Results from last 7 days   Lab Units 10/05/23  0434 10/04/23  0649 10/03/23  0801   POTASSIUM mmol/L 3.6 3.6 3.7   CHLORIDE mmol/L 99 101 103   CO2 mmol/L 30 26 22   BUN mg/dL 25 21 18   CREATININE mg/dL 1.25 1.14 1.02   CALCIUM mg/dL 9.0 9.1 9.2   ALK PHOS U/L  --   --  92   ALT U/L  --   --  9   AST U/L  --   --  14     Results from last 7 days   Lab Units 10/04/23  0649 10/03/23  0801   MAGNESIUM mg/dL 2.0 1.6*                        IMAGING & DIAGNOSTIC TESTING     Radiology Results: I have personally reviewed pertinent reports. XR chest 1 view portable    Result Date: 10/3/2023  Impression: Evidence of pulmonary edema. Pneumonia is not excluded Pulmonary edema was noted on the ER preliminary results. Workstation performed: [id number]     Other Diagnostic Testing: I have personally reviewed pertinent reports.     ACTIVE MEDICATIONS     Current Facility-Administered Medications   Medication Dose Route Frequency   • amLODIPine (NORVASC) tablet 2.5 mg  2.5 mg Oral Daily   • aspirin chewable tablet 81 mg  81 mg Oral Daily   • atorvastatin (LIPITOR) tablet 40 mg  40 mg Oral Daily   • clopidogrel (PLAVIX) tablet 75 mg  75 mg Oral Daily   • enoxaparin (LOVENOX) subcutaneous injection 40 mg  40 mg Subcutaneous Daily   • furosemide (LASIX) tablet 40 mg  40 mg Oral BID (diuretic)   • insulin lispro (HumaLOG) 100 units/mL subcutaneous injection 2-12 Units  2-12 Units Subcutaneous TID AC   • isosorbide mononitrate (IMDUR) 24 hr tablet 30 mg  30 mg Oral Daily   • metoprolol succinate (TOPROL-XL) 24 hr tablet 200 mg  200 mg Oral Daily   • nitroglycerin (NITROSTAT) SL tablet 0.4 mg  0.4 mg Sublingual Q5 Min PRN       VTE Pharmacologic Prophylaxis: Enoxaparin (Lovenox)  VTE Mechanical Prophylaxis: sequential compression device    Portions of the record may have been created with voice recognition software. Occasional wrong word or "sound a like" substitutions may have occurred due to the inherent limitations of voice recognition software.   Read the chart carefully and recognize, using context, where substitutions have occurred.  ==  Tony Romero MD  [facility]  Internal Medicine Residency PGY-2   .im

## 2023-10-05 NOTE — NURSING NOTE
Discharge instructions reviewed with the patient and son, all questions answered.  Transported VIA wheelchair

## 2023-10-05 NOTE — CASE COMMUNICATION
SN called patient to schedule [facility] visit for 10/6/23 per patient [facility] she feels good and plans to follow up with her doctors OP and wants to go to OP therapy.

## 2023-10-05 NOTE — PLAN OF CARE
Problem: OCCUPATIONAL THERAPY ADULT  Goal: Performs self-care activities at highest level of function for planned discharge setting. See evaluation for individualized goals. Outcome: Progressing  Note: Limitation: Decreased ADL status, Decreased endurance, Decreased self-care trans, Decreased high-level ADLs     Assessment: Pt seen on this date for OT session focusing on ADL retraining,  body mechanics, transfer retraining, increasing activity tolerance/endurance to increase ability to participate in ADL/functional tasks. Pt was found in bed and was left in chair w/ all needs within reach. Pt completed bed mob w s, sts w cga and fm w min a from EOB<>bath. Toilet transfers completed w CGA to standard toilet. Pt able to complete perineal hygiene w/ S. Pt tolerated session well overall. rec d/c to home w home OT. The patient's raw score on the AM-PAC Daily Activity Inpatient Short Form is 21. A raw score of greater than or equal to 19 suggests the patient may benefit from discharge to home. Please refer to the recommendation of the Occupational Therapist for safe discharge planning.      OT Discharge Recommendation: Home with home health rehabilitation

## 2023-10-05 NOTE — UTILIZATION REVIEW
NOTIFICATION OF INPATIENT ADMISSION   AUTHORIZATION REQUEST   SERVICING FACILITY:   [address] [place] [address]  Address: [address] [place] [id number]  Tax ID: [id number]  NPI: [id number] ATTENDING PROVIDER:  Attending Name and NPI#: Neva Anaya Md [[id number]]  Address: [address] [place] [address] [address] [address]  Phone: [phone]   ADMISSION INFORMATION:  Place of Service: Inpatient [facility]  Place of Service Code: 21  Inpatient Admission Date/Time: 10/4/23  1:53 PM  Discharge Date/Time: No discharge date for patient encounter. Admitting Diagnosis Code/Description:  Respiratory failure (HCC) [J96.90]  CHF (congestive heart failure) ([address] [facility]) [I50.9]  Elevated troponin [R79.89]  CHF exacerbation ([facility] [address] [facility]) [I50.9]     UTILIZATION REVIEW CONTACT:  Winter [employer] Utilization   Network Utilization Review Department  Phone: [phone]  Fax: [phone]  Email: [email or web address]  Contact for approvals/pending authorizations, clinical reviews, and discharge. PHYSICIAN ADVISORY SERVICES:  Medical Necessity Denial & Fork-id-Lrx[place] Review  Phone: [phone]  Fax: [phone]  Email: [email or web address]     DISCHARGE SUPPORT TEAM:  For Patients Discharge Needs & Updates  Phone: [phone] opt. 2 Fax: [phone]  Email: [email or web address]

## 2023-10-05 NOTE — DISCHARGE SUMMARY
INTERNAL MEDICINE RESIDENCY DISCHARGE SUMMARY     Walter Cheung   79 y.o. female  MRN: [id number]  Room/Bed: [facility] 507/[facility] 507-01     [address] [facility] 5 MED SURG/SD   Encounter: [id number]    Principal Problem:    CHF exacerbation [facility])  Active Problems:    Atrial fibrillation ([address] [facility])    Hyperlipidemia    Hypertension    CKD (chronic kidney disease) stage 3, GFR 30-59 ml/min (HCC)    Coronary artery disease    Type 2 diabetes mellitus with chronic kidney disease, without long-term current use of insulin, unspecified CKD stage ([facility])    Elevated troponin    Respiratory insufficiency      * CHF exacerbation (HCC)  Assessment & Plan  Wt Readings from Last 3 Encounters:   10/05/23 80.8 kg (178 lb 2.1 oz)   05/22/23 83.3 kg (183 lb 9.6 oz)   01/20/23 82.6 kg (182 lb)     Patient arriving with respiratory distress, eventually resolving with a short period of BIPAP now weaned to 4 L/min NC    Labs notable for elevated , no baseline to compare to. Elevated WBC 19.9k    Magnesium 1.6  At her baseline, she is able to walk up two flights of stairs without becoming short of breath. History of CAD with most recent Echo in 2017, EF 65% and consistent with a pseudonormal left ventricular filling pattern, with concomitant abnormal relaxation and increased filling pressure (grade 2 diastolic dysfunction). The left atrium was mildly to moderately dilated. Her bioprosthesis aortic valve exhibited normal function. There was moderate tricuspid valve regurgitation. Estimated peak PA pressure was 55 mmHg. The findings suggest moderate pulmonary hypertension. 1.1L urine output in past 24, 3 lbs weight loss  Echo-  moderate to severe asymmetric hypertrophy of the septal wall. The left ventricular ejection fraction is 55%. Systolic function is normal. Diastolic function is moderately abnormal, consistent with grade II (pseudonormal) relaxation.   •  The following segments are akinetic: basal inferior and mid inferolateral.      Plan:    -Consult Cardiology for CHF exacerbation and elevated troponins, appreciate recs  -Lasix 40 MG PO BID, appreciate diuresis reccs  -Replete magnesium   -prn nitroglycerin  -normal TSH  -Cardiac diet        Atrial fibrillation ([address])  Assessment & Plan  In January 2017 went from sinus emerita to NSR to Afib RVR (after surgery)  On admission, sinus tach with 1st degree block    Appears to be afib on telemetry, rate controlled irregularly irregular  CHADSVASC score is 7 > 11.2% risk  Per cardiology, assumed to be atrial tach    -Continue toprol 200 mg  -Outpatient cardiology follow up      Respiratory insufficiency  Assessment & Plan  Patient presenting in likely CHF exacerbation. Previously weaned off BIPAP to NC. Complaining yesterday of increased yellow sputum associated with cough. COVID/Flu panel negative. No recent sick contacts or illness. Elevated WBC 19.9k, no fever or other symptoms to suggest infection    Chest x-ray: Mixed interstitial and airspace opacities are worse than the prior study as well as pulmonary vascular congestion. No pneumothorax. Haziness at the bases may represent trace effusions.   OFF OXYGEN     Plan:  -Continue to trend WBC  -Monitor for any signs of infection      Elevated troponin  Assessment & Plan  This admission: Initial troponin 156, 2hr troponin 1611, 4hr troponin 2631 in the setting of CHF exacerbation vs NSTEMI  S/p one dose of aspirin 324 mg      Plan:  -Continue aspirin, plavix, lipitor  -PRN nitroglycerin sl 0.4 mg  -Consult Cardiology for elevated troponins, appreciate recs      Type 2 diabetes mellitus with chronic kidney disease, without long-term current use of insulin, unspecified CKD stage [facility])  Assessment & Plan  Lab Results   Component Value Date    HGBA1C 6.7 (H) 10/03/2023       Recent Labs     10/04/23  1649 10/04/23  2056 10/05/23  0708 10/05/23  1116   POCGLU 126 163* 122 150*       Blood Sugar Average: Last 72 hrs:  (P) [id number]     History of T2DM, at home takes metformin 500 mg QD    Plan:  -f/u with repeat hemoglobin A1c  -SSI, optimize glycemic control       Coronary artery disease  Assessment & Plan  History of CAD s/p CABG '96, PCI/stent '12, 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 Currently taking 81 mg aspirin QD and 75 mg plavix QD at home. Prior to admission, patient reporting chest pain 5/10 in severity that is now resolved. S/p one dose of 324 mg aspirin in the ED    Initial troponin 156, 2hr troponin 1611, 4hr troponin 2631  NSTEMI VS CHF      Plan:  -Continue aspirin, plavix, lipitor  -PRN nitroglycerin sl 0.4 mg  -Continue with tele  -Consult Cardiology for elevated troponins, appreciate recs      CKD (chronic kidney disease) stage 3, GFR 30-59 ml/min [facility])  Assessment & Plan  Lab Results   Component Value Date    EGFR 39 10/05/2023    EGFR 44 10/04/2023    EGFR 50 10/03/2023    CREATININE 1.25 10/05/2023    CREATININE 1.14 10/04/2023    CREATININE 1.02 10/03/2023     Known history of CKD 3 with T2DM, baseline creatinine around 1.0-1.1  Upward trending Cr likely due to diuretics    Plan:  -Continue to monitor renal function with BMPs while diuresing           Hypertension  Assessment & Plan  History of hypertension, home meds include amlodipine 2.5 QD and toprol  mg QD    Plan:  -Continue home meds amlodipine 2.5 mg QD and toprol  mg QD    Hyperlipidemia  Assessment & Plan  History of hyperlipidemia and CAD. Last lipid panel in 2020 remarkable for slightly elevated triglycerides. Currently takes 20 mg lipitor QD at home    Plan:  -Increase to Lipitor 40mg       [address] [facility]       H/P per Dr. Niki Monteiro,   "Mrs. Jatin Stoddard is an 80 yo F PMH CAD with CABG in 1996 with stents placed after, HTN, hyperlipidemia, CHF, T2DM, bilateral carotid artery stenosis s/p R CEA and R innominate stent, CKD 3, via EMS in respiratory distress. On arrival, she was on CPAP with 88% O2 saturation.  She was switched to BIPAP and began saturating 100%, now currently with sats in the high 90s on 4 L NC. She was given 40 mg IV lasix and notes significant improvement in her breathing with the oxygen therapy and diuretic. Labs notable for leukocytosis on CBC. Her CMP was unremarkable, kidney function at her baseline. BNP was elevated at 951 and initial troponin was 156.       Prior to coming in, patient noted chest pain (5/10 in severity with 10 being the worst pain of her life and 1 being no pain). The pain has since resolved and she is at 0/10. Her chest pain was non-radiating in nature and she denies it being sharp. She reports compliance with her home medications and has not missed any doses in the preceding days. She denies significant orthopnea, however she does not lie flat but rather sleeps propped up on her side already. At her baseline, she is able to go up two flights of stairs and walk around without any shortness of breath. The past two days, she experienced significant dyspnea. She reported that she has not been eating healthy in the past week. She notes eating saltier foods on top of her usual diet. Yesterday, she had yellowish-sputum associated with a cough that was new but denies any recent illness, sick contacts, fever, or chills. Patient admitted under SOD B service/ pt is level 1 full code. "    For CHF, wean off bipap to 4LNC to RA. Diuresed with 40 mg IV Lasix BID and transitioned oral. Echo showed moderate to severe asymmetric hypertrophy of the septal wall. The left ventricular ejection fraction is 55%. Systolic function is normal. Diastolic function is moderately abnormal, consistent with grade II (pseudonormal) relaxation. Akinetic: basal inferior and mid inferolateral. Continue PRN nitro, continued Toprol 200 mg daily. ASA/plavix/statin continued. Please take Lasix 40 mg PO daily. Lipitor increased to 40 mg. Follow up with cardiology for stress test and PCP.     DISCHARGE INFORMATION     PCP at Discharge: Dr. Dash Maria    Admitting Provider: Mildred Davis MD  Admission Date: 10/3/2023    Discharge Provider: Mildred Davis MD  Discharge Date: 10/5/23    Discharge Disposition: Home/Self Care  Discharge Condition: stable  Discharge with Lines: no    Discharge Diet: cardiac diet  Activity Restrictions: none  Test Results Pending at Discharge: none    Discharge Diagnoses:  Principal Problem:    CHF exacerbation ([address])  Active Problems:    Atrial fibrillation ([address])    Hyperlipidemia    Hypertension    CKD (chronic kidney disease) stage 3, GFR 30-59 ml/min ([facility])    Coronary artery disease    Type 2 diabetes mellitus with chronic kidney disease, without long-term current use of insulin, unspecified CKD stage ([facility])    Elevated troponin    Respiratory insufficiency  Resolved Problems:    * No resolved hospital problems. *      Consulting Providers:  Cardiology    Diagnostic & Therapeutic Procedures Performed:  XR chest 1 view portable    Result Date: 10/3/2023  Impression: Evidence of pulmonary edema. Pneumonia is not excluded Pulmonary edema was noted on the ER preliminary results. Workstation performed: [id number]       Code Status: Level 1 - Full Code  Advance Directive & Living Will: <no information>  Power of :    POLST:      Medications:  Current Discharge Medication List      STOP taking these medications       acetaminophen (TYLENOL) 325 mg tablet Comments:   Reason for Stopping:             Current Discharge Medication List      START taking these medications    Details   furosemide (LASIX) 40 mg tablet Take 1 tablet (40 mg total) by mouth daily  Qty: 30 tablet, Refills: 0    Associated Diagnoses: CHF exacerbation ([facility])      isosorbide mononitrate (IMDUR) 30 mg 24 hr tablet Take 1 tablet (30 mg total) by mouth daily Do not start before October 6, 2023.   Qty: 30 tablet, Refills: 0    Associated Diagnoses: Coronary artery disease, unspecified vessel or lesion type, unspecified whether angina present, unspecified whether native or transplanted heart; Demand ischemia of myocardium           Current Discharge Medication List      CONTINUE these medications which have NOT CHANGED    Details   clopidogrel (PLAVIX) 75 mg tablet Take 1 tablet (75 mg total) by mouth daily  Qty: 90 tablet, Refills: 3    Associated Diagnoses: Coronary artery disease of native heart with stable angina pectoris, unspecified vessel or lesion type ([facility])      metFORMIN (GLUCOPHAGE) 500 mg tablet Take 1 tablet (500 mg total) by mouth daily  Qty: 90 tablet, Refills: 3    Associated Diagnoses: Type 2 diabetes mellitus without complication, with long-term current use of insulin ([facility])      metoprolol succinate (TOPROL-XL) 200 MG 24 hr tablet Take 1 tablet (200 mg total) by mouth daily  Qty: 90 tablet, Refills: 0    Associated Diagnoses: Essential hypertension      allopurinol (ZYLOPRIM) 300 mg tablet Take 1 tablet (300 mg total) by mouth daily  Qty: 90 tablet, Refills: 3    Associated Diagnoses: Gout due to renal impairment, unspecified chronicity, unspecified site      amLODIPine (NORVASC) 2.5 mg tablet Take 1 tablet (2.5 mg total) by mouth daily  Qty: 90 tablet, Refills: 0    Associated Diagnoses: Essential hypertension      aspirin 81 mg chewable tablet Chew 1 tablet daily for 30 days  Qty: 30 tablet, Refills: 0      nitroglycerin (NITROSTAT) 0.4 mg SL tablet Place 0.4 mg under the tongue every 5 (five) minutes as needed for chest pain             Allergies:   Allergies   Allergen Reactions   • Medical Tape    • Other Rash     Pt states she is allergic to tape adhesive       FOLLOW-UP     PCP Outpatient Follow-up:  yes      Follow up: Dr. Nora Nj   Date and time: 2 weeks  Location: PCP  Follow up within next: 2 weeks    Consulting Providers Follow-up:  yes      Physician name: Cardiology  Specialty: Cardiology  Office phone number:   Follow up within next:      Active Issues Requiring Follow-up:   none   Stress test outpatient per cardiology    Discharge Statement:   I spent 30 minutes minutes discharging the patient. This time was spent on the day of discharge. I had direct contact with the patient on the day of discharge. Additional documentation is required if more than 30 minutes were spent on discharge. Portions of the record may have been created with voice recognition software. Occasional wrong word or "sound a like" substitutions may have occurred due to the inherent limitations of voice recognition software.   Read the chart carefully and recognize, using context, where substitutions have occurred.    ==  Ishaan Latif MD  [address] [facility]  Internal Medicine Resident PGY-2

## 2023-10-05 NOTE — DISCHARGE INSTR - AVS FIRST PAGE
Please follow up with PCP in 2 weeks and Cardiologist in 1 month.   Start Lasix 40 mg oral daily and Imdur daily  Continue metoprolol  Outpatient stress test arranged by cardiology  Continue aspirin, statin, plavix

## 2023-10-05 NOTE — PROGRESS NOTES
Cardiology Team  Progress Note - Jenn Beaver 79 y.o. female MRN: [id number]    Unit/Bed#: [facility] 730-74 Encounter: [id number]          Subjective:   Patient seen and examined. No significant events overnight. Denies lightheadedness, dizziness, syncope, headache, vision changes, diaphoresis, chest pain, palpitations, shortness of breath, PND, orthopnea, nausea, vomiting, abdominal pain or lower extremity edema. She has not been up to walk so I discussed that with assistance she should walk and see how she feels. Hospital Course:   Jenn Beaver is a 79y.o. year old female with a history of CAD w/CABG in 1996, AVR with bioprosthetic stent in 2011, bilateral carotid artery stneosis s/p CEA and R innominate artery stent, HLD, and HTN. On presentation to the ED, she was on CPAP with 88% oxygen saturation, she was then transitioned to BiPAP with 100% saturation. She was given a dose of IV lasix 40 mg with good output and the patient noted improvement of symptoms. She was titrated to 4L NC. Additionally, they obtained an EKG which showed sinus tachycardia with right axis deviation. She had uptrending troponins upto 2,631 and BNP of 951. She was determined to have acute heart failure. She did report having an intermittent cough with yellow sputum production. A viral respiratory panel with COVID/FLU/RSV was negative. She was managed for acute heart failure with lasix 40 mg BID with ECHO pending and suspected troponin elevation 2/2 demand ischemia. Vitals: Blood pressure 143/50, pulse 74, temperature 97.9 °F (36.6 °C), temperature source Oral, resp. rate 18, height 5' 3" (1.6 m), weight 80.8 kg (178 lb 2.1 oz), SpO2 90 %. , Body mass index is 31.55 kg/m².,   Orthostatic Blood Pressures    Flowsheet Row Most Recent Value   Blood Pressure 143/50 filed at 10/05/2023 3947   Patient Position - Orthostatic VS Sitting filed at 10/04/2023 5154            Intake/Output Summary (Last 24 hours) at 10/5/2023 3831  Last data filed at 10/5/2023 1176  Gross per 24 hour   Intake 360 ml   Output 1116 ml   Net -756 ml       Review of System:  Review of system was conducted and was negative except for as stated in the subjective course.     Physical Exam:    GEN: Yan Harvey appears well, alert and oriented x 3, pleasant and cooperative   HEENT:  Normocephalic, atraumatic, anicteric, moist mucous membranes  NECK: No JVD or carotid bruits   HEART: regular rhythm, normal rate, normal S1 and S2, no murmurs, clicks, gallops or rubs   LUNGS: Clear to auscultation bilaterally; no wheezes, rales, or rhonchi; respiration nonlabored   ABDOMEN:  Normoactive bowel sounds, soft, no tenderness, no distention  EXTREMITIES: peripheral pulses palpable; no edema  NEURO: no gross focal findings; cranial nerves grossly intact   SKIN:  Dry, intact, warm to touch      Current Facility-Administered Medications:   •  amLODIPine (NORVASC) tablet 2.5 mg, 2.5 mg, Oral, Daily, Het OTIS Mcadams, DO, 2.5 mg at 10/04/23 0907  •  aspirin chewable tablet 81 mg, 81 mg, Oral, Daily, Het OTIS Mcadams, DO, 81 mg at 10/04/23 0908  •  atorvastatin (LIPITOR) tablet 40 mg, 40 mg, Oral, Daily, Alba Dhillon MD  •  clopidogrel (PLAVIX) tablet 75 mg, 75 mg, Oral, Daily, Het OTIS Mcadams, DO, 75 mg at 10/04/23 0908  •  enoxaparin (LOVENOX) subcutaneous injection 40 mg, 40 mg, Subcutaneous, Daily, Het OTIS Mcadams, DO, 40 mg at 10/04/23 0908  •  furosemide (LASIX) tablet 40 mg, 40 mg, Oral, BID (diuretic), Alba Dhillon MD  •  insulin lispro (HumaLOG) 100 units/mL subcutaneous injection 2-12 Units, 2-12 Units, Subcutaneous, TID AC, 8 Units at 10/04/23 1202 **AND** Fingerstick Glucose (POCT), , , TID AC, Kylee Mcadams DO  •  isosorbide mononitrate (IMDUR) 24 hr tablet 30 mg, 30 mg, Oral, Daily, Abhi Bennett, DO, 30 mg at 10/04/23 0908  •  metoprolol succinate (TOPROL-XL) 24 hr tablet 200 mg, 200 mg, Oral, Daily, Het D DO Abbe, 200 mg at 10/04/23 0907  •  nitroglycerin (NITROSTAT) SL tablet 0.4 mg, 0.4 mg, Sublingual, Q5 Min PRN, Het D Mcadams, DO    Labs & Results:  Results from last 7 days   Lab Units 10/03/23  1235 10/03/23  1001 10/03/23  0801   HS TNI 0HR ng/L  --   --  156*   HS TNI 2HR ng/L  --  1,611*  --    HS TNI 4HR ng/L 2,631*  --   --          Results from last 7 days   Lab Units 10/05/23  0434 10/04/23  0649 10/03/23  0801   POTASSIUM mmol/L 3.6 3.6 3.7   CO2 mmol/L 30 26 22   CHLORIDE mmol/L 99 101 103   BUN mg/dL 25 21 18   CREATININE mg/dL 1.25 1.14 1.02     Results from last 7 days   Lab Units 10/05/23  0434 10/04/23  0649 10/03/23  0801   HEMOGLOBIN g/dL 10.8* 11.0* 12.1   HEMATOCRIT % 33.9* 34.6* 38.5   PLATELETS Thousands/uL 238 230 315     Results from last 7 days   Lab Units 10/03/23  0801   TRIGLYCERIDES mg/dL 133   HDL mg/dL 43*   LDL CALC mg/dL 97   HEMOGLOBIN A1C % 6.7*       Telemetry:   Personally reviewed by Luiza Rousseau: atrial tachycardia with 1st degree AV block    Imaging:     Study: TTE  Result Date: 10/5/2023  Findings:   Left Ventricle: Left ventricular cavity size is normal. There is moderate to severe asymmetric hypertrophy of the septal wall. The left ventricular ejection fraction is 55%. Systolic function is normal. Diastolic function is moderately abnormal, consistent with grade II (pseudonormal) relaxation. •  The following segments are akinetic: basal inferior and mid inferolateral.  •  The following segments are hypokinetic: basal inferolateral, basal anterolateral, mid anterolateral and apical lateral.  •  All other segments are normal.  •  Left Atrium: The atrium is mildly dilated. •  Aortic Valve: There is trace regurgitation. •  Mitral Valve: There is moderate annular calcification. There is mild to moderate regurgitation. •  Tricuspid Valve: There is mild to moderate regurgitation.           Assessment:  Principal Problem:    CHF exacerbation (HCC)  Active Problems:    Hyperlipidemia    Hypertension    CKD (chronic kidney disease) stage 3, GFR 30-59 ml/min [facility])    Coronary artery disease    Atrial fibrillation (HCC)    Type 2 diabetes mellitus with chronic kidney disease, without long-term current use of insulin, unspecified CKD stage (HCC)    Elevated troponin    Respiratory insufficiency      1. CHF Exacerbation  - Likely secondary to worsening HFpEF and segments that are now akinetic (basal inferior and mid inferolateral) and hypokinetic (basal inferolateral, basal anterolateral, mid anterolateral and apical lateral). With mildly reduced LVEF of 55% (was 65%) and moderate to severe asymmetric hypertrophy of septal wall (was mild to moderate). - Continue Norvasc 2.5 mg, ASA 81 mg, plavix 75 mg, lipitor 40 mg, imdur 30 mg, and toprol- mg.  - Start Lasix 40 mg qd  - Recommend f/u with stress test and will titrate medical therapy in the outpatient setting.  - Stable for discharge from cardiac standpoint. 2. Elevated Troponin  - It is likely related to demand ischemia with chronic CAD, her symptomatology does not suggest concern for ACS.        Malathi Manley  MS-4

## 2023-10-05 NOTE — UTILIZATION REVIEW
Date: 10/05/23    Day 3: Has surpassed a 2nd midnight with active treatments and services, which include telemetry, BG checks ACHS, DW, I&O, Trend labs, replete electrolytes as needed. See initial review completed by Ana Brown on 10/4.

## 2023-10-06 ENCOUNTER — TRANSITIONAL CARE MANAGEMENT (OUTPATIENT)
Dept: INTERNAL MEDICINE CLINIC | Facility: OTHER | Age: 83
End: 2023-10-06

## 2023-10-06 DIAGNOSIS — I10 ESSENTIAL HYPERTENSION: ICD-10-CM

## 2023-10-06 RX ORDER — AMLODIPINE BESYLATE 2.5 MG/1
2.5 TABLET ORAL DAILY
Qty: 90 TABLET | Refills: 1 | Status: SHIPPED | OUTPATIENT
Start: 2023-10-06 | End: 2024-01-04

## 2023-10-06 RX ORDER — METOPROLOL SUCCINATE 200 MG/1
200 TABLET, EXTENDED RELEASE ORAL DAILY
Qty: 90 TABLET | Refills: 1 | Status: SHIPPED | OUTPATIENT
Start: 2023-10-06

## 2023-10-09 ENCOUNTER — APPOINTMENT (INPATIENT)
Dept: RADIOLOGY | Facility: HOSPITAL | Age: 83
End: 2023-10-09
Payer: COMMERCIAL

## 2023-10-09 ENCOUNTER — HOSPITAL ENCOUNTER (INPATIENT)
Facility: HOSPITAL | Age: 83
LOS: 2 days | Discharge: HOME | End: 2023-10-11
Attending: EMERGENCY MEDICINE | Admitting: INTERNAL MEDICINE
Payer: COMMERCIAL

## 2023-10-09 ENCOUNTER — APPOINTMENT (EMERGENCY)
Dept: RADIOLOGY | Facility: HOSPITAL | Age: 83
End: 2023-10-09
Payer: COMMERCIAL

## 2023-10-09 DIAGNOSIS — R26.2 AMBULATORY DYSFUNCTION: ICD-10-CM

## 2023-10-09 DIAGNOSIS — M79.671 FOOT PAIN, RIGHT: Primary | ICD-10-CM

## 2023-10-09 LAB
ANION GAP SERPL CALCULATED.3IONS-SCNC: 15 MMOL/L
BASOPHILS # BLD AUTO: 0.1 THOUSANDS/ÂΜL (ref 0–0.1)
BASOPHILS NFR BLD AUTO: 1 % (ref 0–1)
BNP SERPL-MCNC: 934 PG/ML (ref 0–100)
BUN SERPL-MCNC: 36 MG/DL (ref 5–25)
CALCIUM SERPL-MCNC: 9 MG/DL (ref 8.4–10.2)
CHLORIDE SERPL-SCNC: 96 MMOL/L (ref 96–108)
CO2 SERPL-SCNC: 25 MMOL/L (ref 21–32)
CREAT SERPL-MCNC: 1.2 MG/DL (ref 0.6–1.3)
EOSINOPHIL # BLD AUTO: 0.2 THOUSAND/ÂΜL (ref 0–0.61)
EOSINOPHIL NFR BLD AUTO: 1 % (ref 0–6)
ERYTHROCYTE [DISTWIDTH] IN BLOOD BY AUTOMATED COUNT: 14.3 % (ref 11.6–15.1)
GFR SERPL CREATININE-BSD FRML MDRD: 41 ML/MIN/1.73SQ M
GLUCOSE SERPL-MCNC: 150 MG/DL (ref 65–140)
GLUCOSE SERPL-MCNC: 153 MG/DL (ref 65–140)
GLUCOSE SERPL-MCNC: 159 MG/DL (ref 65–140)
GLUCOSE SERPL-MCNC: 177 MG/DL (ref 65–140)
HCT VFR BLD AUTO: 33.9 % (ref 34.8–46.1)
HGB BLD-MCNC: 11.2 G/DL (ref 11.5–15.4)
IMM GRANULOCYTES # BLD AUTO: 0.08 THOUSAND/UL (ref 0–0.2)
IMM GRANULOCYTES NFR BLD AUTO: 1 % (ref 0–2)
LYMPHOCYTES # BLD AUTO: 2.9 THOUSANDS/ÂΜL (ref 0.6–4.47)
LYMPHOCYTES NFR BLD AUTO: 20 % (ref 14–44)
MCH RBC QN AUTO: 29.4 PG (ref 26.8–34.3)
MCHC RBC AUTO-ENTMCNC: 33 G/DL (ref 31.4–37.4)
MCV RBC AUTO: 89 FL (ref 82–98)
MONOCYTES # BLD AUTO: 1.07 THOUSAND/ÂΜL (ref 0.17–1.22)
MONOCYTES NFR BLD AUTO: 8 % (ref 4–12)
NEUTROPHILS # BLD AUTO: 9.9 THOUSANDS/ÂΜL (ref 1.85–7.62)
NEUTS SEG NFR BLD AUTO: 69 % (ref 43–75)
NRBC BLD AUTO-RTO: 0 /100 WBCS
PLATELET # BLD AUTO: 286 THOUSANDS/UL (ref 149–390)
PLATELET # BLD AUTO: 299 THOUSANDS/UL (ref 149–390)
PMV BLD AUTO: 10.7 FL (ref 8.9–12.7)
PMV BLD AUTO: 10.7 FL (ref 8.9–12.7)
POTASSIUM SERPL-SCNC: 3.4 MMOL/L (ref 3.5–5.3)
RBC # BLD AUTO: 3.81 MILLION/UL (ref 3.81–5.12)
SODIUM SERPL-SCNC: 136 MMOL/L (ref 135–147)
URATE SERPL-MCNC: 7 MG/DL (ref 2–7.5)
WBC # BLD AUTO: 14.25 THOUSAND/UL (ref 4.31–10.16)

## 2023-10-09 PROCEDURE — 85025 COMPLETE CBC W/AUTO DIFF WBC: CPT

## 2023-10-09 PROCEDURE — 82948 REAGENT STRIP/BLOOD GLUCOSE: CPT

## 2023-10-09 PROCEDURE — 97163 PT EVAL HIGH COMPLEX 45 MIN: CPT

## 2023-10-09 PROCEDURE — 87040 BLOOD CULTURE FOR BACTERIA: CPT

## 2023-10-09 PROCEDURE — 73630 X-RAY EXAM OF FOOT: CPT

## 2023-10-09 PROCEDURE — 99223 1ST HOSP IP/OBS HIGH 75: CPT | Performed by: INTERNAL MEDICINE

## 2023-10-09 PROCEDURE — 83880 ASSAY OF NATRIURETIC PEPTIDE: CPT

## 2023-10-09 PROCEDURE — 80048 BASIC METABOLIC PNL TOTAL CA: CPT

## 2023-10-09 PROCEDURE — 73590 X-RAY EXAM OF LOWER LEG: CPT

## 2023-10-09 PROCEDURE — 71045 X-RAY EXAM CHEST 1 VIEW: CPT

## 2023-10-09 PROCEDURE — 99285 EMERGENCY DEPT VISIT HI MDM: CPT | Performed by: EMERGENCY MEDICINE

## 2023-10-09 PROCEDURE — 97167 OT EVAL HIGH COMPLEX 60 MIN: CPT

## 2023-10-09 PROCEDURE — 36415 COLL VENOUS BLD VENIPUNCTURE: CPT

## 2023-10-09 PROCEDURE — 84550 ASSAY OF BLOOD/URIC ACID: CPT

## 2023-10-09 PROCEDURE — 99283 EMERGENCY DEPT VISIT LOW MDM: CPT

## 2023-10-09 PROCEDURE — 85049 AUTOMATED PLATELET COUNT: CPT

## 2023-10-09 PROCEDURE — 99221 1ST HOSP IP/OBS SF/LOW 40: CPT | Performed by: PODIATRIST

## 2023-10-09 RX ORDER — POTASSIUM CHLORIDE 20 MEQ/1
40 TABLET, EXTENDED RELEASE ORAL ONCE
Status: COMPLETED | OUTPATIENT
Start: 2023-10-09 | End: 2023-10-09

## 2023-10-09 RX ORDER — ACETAMINOPHEN 325 MG/1
650 TABLET ORAL ONCE
Status: COMPLETED | OUTPATIENT
Start: 2023-10-09 | End: 2023-10-09

## 2023-10-09 RX ORDER — AMLODIPINE BESYLATE 2.5 MG/1
2.5 TABLET ORAL DAILY
Status: DISCONTINUED | OUTPATIENT
Start: 2023-10-09 | End: 2023-10-11 | Stop reason: HOSPADM

## 2023-10-09 RX ORDER — INSULIN LISPRO 100 [IU]/ML
1-6 INJECTION, SOLUTION INTRAVENOUS; SUBCUTANEOUS
Status: DISCONTINUED | OUTPATIENT
Start: 2023-10-09 | End: 2023-10-11 | Stop reason: HOSPADM

## 2023-10-09 RX ORDER — HEPARIN SODIUM 5000 [USP'U]/ML
5000 INJECTION, SOLUTION INTRAVENOUS; SUBCUTANEOUS EVERY 8 HOURS SCHEDULED
Status: DISCONTINUED | OUTPATIENT
Start: 2023-10-09 | End: 2023-10-11 | Stop reason: HOSPADM

## 2023-10-09 RX ORDER — ENOXAPARIN SODIUM 100 MG/ML
40 INJECTION SUBCUTANEOUS DAILY
Status: DISCONTINUED | OUTPATIENT
Start: 2023-10-09 | End: 2023-10-09

## 2023-10-09 RX ORDER — CLOPIDOGREL BISULFATE 75 MG/1
75 TABLET ORAL DAILY
Status: DISCONTINUED | OUTPATIENT
Start: 2023-10-09 | End: 2023-10-11 | Stop reason: HOSPADM

## 2023-10-09 RX ORDER — METOPROLOL SUCCINATE 100 MG/1
200 TABLET, EXTENDED RELEASE ORAL DAILY
Status: DISCONTINUED | OUTPATIENT
Start: 2023-10-09 | End: 2023-10-11 | Stop reason: HOSPADM

## 2023-10-09 RX ORDER — ASPIRIN 81 MG/1
81 TABLET, CHEWABLE ORAL DAILY
Status: DISCONTINUED | OUTPATIENT
Start: 2023-10-09 | End: 2023-10-11 | Stop reason: HOSPADM

## 2023-10-09 RX ORDER — METOPROLOL SUCCINATE 100 MG/1
200 TABLET, EXTENDED RELEASE ORAL DAILY
Status: DISCONTINUED | OUTPATIENT
Start: 2023-10-09 | End: 2023-10-09

## 2023-10-09 RX ORDER — AMLODIPINE BESYLATE 2.5 MG/1
2.5 TABLET ORAL DAILY
Status: DISCONTINUED | OUTPATIENT
Start: 2023-10-09 | End: 2023-10-09

## 2023-10-09 RX ORDER — ALLOPURINOL 300 MG/1
300 TABLET ORAL DAILY
Status: DISCONTINUED | OUTPATIENT
Start: 2023-10-09 | End: 2023-10-11 | Stop reason: HOSPADM

## 2023-10-09 RX ORDER — FUROSEMIDE 40 MG/1
40 TABLET ORAL DAILY
Status: DISCONTINUED | OUTPATIENT
Start: 2023-10-09 | End: 2023-10-09

## 2023-10-09 RX ORDER — COLCHICINE 0.6 MG/1
1.2 TABLET ORAL ONCE
Status: COMPLETED | OUTPATIENT
Start: 2023-10-09 | End: 2023-10-09

## 2023-10-09 RX ORDER — ATORVASTATIN CALCIUM 40 MG/1
40 TABLET, FILM COATED ORAL
Status: DISCONTINUED | OUTPATIENT
Start: 2023-10-09 | End: 2023-10-11 | Stop reason: HOSPADM

## 2023-10-09 RX ORDER — FUROSEMIDE 40 MG/1
40 TABLET ORAL DAILY
Status: DISCONTINUED | OUTPATIENT
Start: 2023-10-09 | End: 2023-10-11 | Stop reason: HOSPADM

## 2023-10-09 RX ORDER — ISOSORBIDE MONONITRATE 30 MG/1
30 TABLET, EXTENDED RELEASE ORAL DAILY
Status: DISCONTINUED | OUTPATIENT
Start: 2023-10-09 | End: 2023-10-11 | Stop reason: HOSPADM

## 2023-10-09 RX ORDER — COLCHICINE 0.6 MG/1
0.6 TABLET ORAL 2 TIMES DAILY
Status: DISCONTINUED | OUTPATIENT
Start: 2023-10-09 | End: 2023-10-11 | Stop reason: HOSPADM

## 2023-10-09 RX ADMIN — ALLOPURINOL 300 MG: 300 TABLET ORAL at 08:33

## 2023-10-09 RX ADMIN — ATORVASTATIN CALCIUM 40 MG: 40 TABLET, FILM COATED ORAL at 17:32

## 2023-10-09 RX ADMIN — ASPIRIN 81 MG CHEWABLE TABLET 81 MG: 81 TABLET CHEWABLE at 08:31

## 2023-10-09 RX ADMIN — INSULIN LISPRO 1 UNITS: 100 INJECTION, SOLUTION INTRAVENOUS; SUBCUTANEOUS at 17:32

## 2023-10-09 RX ADMIN — INSULIN LISPRO 1 UNITS: 100 INJECTION, SOLUTION INTRAVENOUS; SUBCUTANEOUS at 08:04

## 2023-10-09 RX ADMIN — FUROSEMIDE 40 MG: 40 TABLET ORAL at 15:20

## 2023-10-09 RX ADMIN — AMLODIPINE BESYLATE 2.5 MG: 2.5 TABLET ORAL at 15:20

## 2023-10-09 RX ADMIN — POTASSIUM CHLORIDE 40 MEQ: 1500 TABLET, EXTENDED RELEASE ORAL at 08:40

## 2023-10-09 RX ADMIN — ACETAMINOPHEN 650 MG: 325 TABLET ORAL at 04:03

## 2023-10-09 RX ADMIN — COLCHICINE 0.6 MG: 0.6 TABLET ORAL at 17:32

## 2023-10-09 RX ADMIN — HEPARIN SODIUM 5000 UNITS: 5000 INJECTION INTRAVENOUS; SUBCUTANEOUS at 15:21

## 2023-10-09 RX ADMIN — METOPROLOL SUCCINATE 200 MG: 100 TABLET, EXTENDED RELEASE ORAL at 15:20

## 2023-10-09 RX ADMIN — CLOPIDOGREL BISULFATE 75 MG: 75 TABLET, FILM COATED ORAL at 08:33

## 2023-10-09 RX ADMIN — HEPARIN SODIUM 5000 UNITS: 5000 INJECTION INTRAVENOUS; SUBCUTANEOUS at 07:24

## 2023-10-09 RX ADMIN — COLCHICINE 1.2 MG: 0.6 TABLET ORAL at 10:33

## 2023-10-09 RX ADMIN — HEPARIN SODIUM 5000 UNITS: 5000 INJECTION INTRAVENOUS; SUBCUTANEOUS at 21:07

## 2023-10-09 NOTE — ASSESSMENT & PLAN NOTE
Lab Results   Component Value Date    HGBA1C 6.7 (H) 10/03/2023       No results for input(s): "POCGLU" in the last 72 hours.     Plan:  - Holding home med: metFORMIN (GLUCOPHAGE) 500 mg tablet daily  - Start SSI algorithm 3

## 2023-10-09 NOTE — ASSESSMENT & PLAN NOTE
Wt Readings from Last 3 Encounters:   10/05/23 80.8 kg (178 lb 2.1 oz)   05/22/23 83.3 kg (183 lb 9.6 oz)   01/20/23 82.6 kg (182 lb)     Recent admission for CHF exacerbation on 10/3. Most recent echo showed moderate to severe asymmetric hypertrophy of the septal wall. The left ventricular ejection fraction is 55%. Systolic function is normal. Diastolic function is moderately abnormal, consistent with grade II (pseudonormal) relaxation.  The following segments are akinetic: basal inferior and mid inferolateral.    Plan:  - Continue home  furosemide (LASIX) 40 mg tablet daily  - Monitor daily weights and I/Os  - Cardiac diet

## 2023-10-09 NOTE — ED PROVIDER NOTES
History  Chief Complaint   Patient presents with   • Foot Pain     Pt states she was recently discharged a couple days ago and states while here her right foot started hurting and it got worse after she was home so she wants it checked. No other complaints noted     Patient is a 18-year-old female presenting for evaluation of right foot pain. States that she was recently admitted to the hospital for "fluid on the lungs and irregular heart rhythm". States that during physical therapy unsure if she hit her foot or what happened but since discharge several days ago has been complaining of worsening right foot pain states that she lives at home with her son is normally ambulatory for the last day and a half has been unable to bear weight on the foot prompting her to seek evaluation. Denies any other complaints at this time. Prior to Admission Medications   Prescriptions Last Dose Informant Patient Reported? Taking?   allopurinol (ZYLOPRIM) 300 mg tablet  Self No No   Sig: Take 1 tablet (300 mg total) by mouth daily   amLODIPine (NORVASC) 2.5 mg tablet   No No   Sig: Take 1 tablet (2.5 mg total) by mouth daily   aspirin 81 mg chewable tablet  Self No No   Sig: Chew 1 tablet daily for 30 days   atorvastatin (LIPITOR) 40 mg tablet   No No   Sig: Take 1 tablet (40 mg total) by mouth daily Do not start before October 6, 2023. clopidogrel (PLAVIX) 75 mg tablet  Self No No   Sig: Take 1 tablet (75 mg total) by mouth daily   furosemide (LASIX) 40 mg tablet   No No   Sig: Take 1 tablet (40 mg total) by mouth daily   isosorbide mononitrate (IMDUR) 30 mg 24 hr tablet   No No   Sig: Take 1 tablet (30 mg total) by mouth daily Do not start before October 6, 2023.    metFORMIN (GLUCOPHAGE) 500 mg tablet  Self No No   Sig: Take 1 tablet (500 mg total) by mouth daily   metoprolol succinate (TOPROL-XL) 200 MG 24 hr tablet   No No   Sig: Take 1 tablet (200 mg total) by mouth daily   nitroglycerin (NITROSTAT) 0.4 mg SL tablet Self Yes No   Sig: Place 0.4 mg under the tongue every 5 (five) minutes as needed for chest pain      Facility-Administered Medications: None       Past Medical History:   Diagnosis Date   • Aortic valve stenosis    • Aorto-iliac disease ([address])    • Carotid stenosis    • Cataract of right eye 10/25/2016   • Cataract, left eye 10/18/2016   • Chronic systolic CHF (congestive heart failure) ([facility])    • CKD (chronic kidney disease) stage 3, GFR 30-59 ml/min ([facility])    • Coronary artery disease    • Diabetes mellitus ([facility])     Type 2   • Dyslipidemia    • Gout    • Hyperlipidemia    • Hypertension    • 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   • Irritable bladder    • PAD (peripheral artery disease) ([facility])    • Renal artery stenosis ([facility])     Right       Past Surgical History:   Procedure Laterality Date   • ABDOMINAL AORTIC ANEURYSM REPAIR     • AORTIC VALVE REPLACEMENT     • CAROTID ENDARTERECTOMY Right 2017   • CATARACT EXTRACTION     •  SECTION     • CORONARY ARTERY BYPASS GRAFT     • CORONARY STENT PLACEMENT     • CYSTOSCOPY     • HYSTERECTOMY     • MAMMO (HISTORICAL)  06/10/2019   • MT TEAEC W/PATCH GRF CAROTID VERTB SUBCLAV NECK INC Right 2017    Procedure: CAROTID ENDARTERECTOMY WITH RETROGRADE AND INNOMINATE ARTERY STENTING;  Surgeon: Radha John MD;  Location: [facility];  Service: Vascular   • MT XCAPSL CTRC RMVL INSJ IO LENS PROSTH W/O ECP Right 10/25/2016    Procedure: OD EXTRACTION EXTRACAPSULAR CATARACT PHACO INTRAOCULAR LENS (IOL); Surgeon: Margo Gonzalez MD;  Location: [facility];  Service: Ophthalmology   • MT XCAPSL CTRC RMVL INSJ IO LENS PROSTH W/O ECP Left 10/18/2016    Procedure: OS EXTRACTION EXTRACAPSULAR CATARACT PHACO INTRAOCULAR LENS (IOL);   Surgeon: Margo Gonzalez MD;  Location: [facility];  Service: Ophthalmology   • VASCULAR SURGERY         Family History   Problem Relation Age of Onset   • Diabetes Mother      I have reviewed and agree with the history as documented. E-Cigarette/Vaping     E-Cigarette/Vaping Substances     Social History     Tobacco Use   • Smoking status: Former     Packs/day: 0.50     Years: 42.00     Total pack years: 21.00     Types: Cigarettes     Start date: 5     Quit date:      Years since quittin.7   • Smokeless tobacco: Never   • Tobacco comments:     quit 20 years ago   Substance Use Topics   • Alcohol use: No   • Drug use: No        Review of Systems   Constitutional: Negative for chills and fever. HENT: Negative for congestion. Respiratory: Negative for cough and shortness of breath. Cardiovascular: Negative for chest pain. Gastrointestinal: Negative for abdominal pain, nausea and vomiting. Genitourinary: Negative for dysuria, frequency, hematuria, vaginal bleeding and vaginal discharge. Musculoskeletal: Negative for back pain. Right foot pain   Skin: Negative for rash. Neurological: Negative for weakness, numbness and headaches. All other systems reviewed and are negative. Physical Exam  ED Triage Vitals [10/09/23 0226]   Temperature Pulse Respirations Blood Pressure SpO2   97.5 °F (36.4 °C) 64 16 155/80 97 %      Temp Source Heart Rate Source Patient Position - Orthostatic VS BP Location FiO2 (%)   Oral Monitor Lying Left arm --      Pain Score       10 - Worst Possible Pain             Orthostatic Vital Signs  Vitals:    10/09/23 0226 10/09/23 0415   BP: 155/80 (!) 140/43   Pulse: 64 88   Patient Position - Orthostatic VS: Lying        Physical Exam  Vitals and nursing note reviewed. Constitutional:       General: She is not in acute distress. Appearance: She is well-developed. HENT:      Head: Normocephalic and atraumatic. Eyes:      Conjunctiva/sclera: Conjunctivae normal.   Cardiovascular:      Rate and Rhythm: Normal rate and regular rhythm. Heart sounds: No murmur heard. Pulmonary:      Effort: Pulmonary effort is normal. No respiratory distress.       Breath sounds: Normal breath sounds. Abdominal:      Palpations: Abdomen is soft. Tenderness: There is no abdominal tenderness. Musculoskeletal:         General: No swelling. Cervical back: Neck supple. Comments: Decreased range of motion of the right ankle. Tenderness to palpation bilateral malleoli as well as the base of the fifth metatarsal and fibular head. No ecchymosis erythema warmth. The leg itself is warm well perfused 2+ pulses. Moving all other extremities no other injury or tenderness to palpation   Skin:     General: Skin is warm and dry. Capillary Refill: Capillary refill takes less than 2 seconds. Neurological:      Mental Status: She is alert. Psychiatric:         Mood and Affect: Mood normal.         ED Medications  Medications   acetaminophen (TYLENOL) tablet 650 mg (650 mg Oral Given 10/9/23 0363)       Diagnostic Studies  Results Reviewed     None                 XR foot 3+ views RIGHT   ED Interpretation by Deandra Blackmon DO (10/09 1025)   Wet read - no fracture or dislocation       XR tibia fibula 2 views RIGHT   ED Interpretation by Deandra Blackmon DO (10/09 6209)   Wet read - no fracture or dislocation             Procedures  Procedures      ED Course  ED Course as of 10/09/23 0527   Mon Oct 09, 2023   0430 On reassessment pt unable to bear weight or ambulate. TT sent out to SOD                Identification of Seniors at [facility] Most Recent Value   (ISAR) Identification of Seniors at Risk    Before the illness or injury that brought you to the Emergency, did you need someone to help you on a regular basis? 0 Filed at: 10/09/2023 6121   In the last 24 hours, have you needed more help than usual? 1 Filed at: 10/09/2023 6122   Have you been hospitalized for one or more nights during the past 6 months? 1 Filed at: 10/09/2023 0597   In general, do you see well? 0 Filed at: 10/09/2023 7052   In general, do you have serious problems with your memory?  0 Filed at: 10/09/2023 0229   Do you take more than three different medications every day? 1 Filed at: 10/09/2023 6714   ISAR Score 3 Filed at: 10/09/2023 0229                    SBIRT 22yo+    Flowsheet Row Most Recent Value   Initial Alcohol Screen: US AUDIT-C     1. How often do you have a drink containing alcohol? 0 Filed at: 10/09/2023 0229   2. How many drinks containing alcohol do you have on a typical day you are drinking? 0 Filed at: 10/09/2023 0229   3b. FEMALE Any Age, or MALE 65+: How often do you have 4 or more drinks on one occassion? 0 Filed at: 10/09/2023 9141   Audit-C Score 0 Filed at: 10/09/2023 0229   CHARLES: How many times in the past year have you. .. Used an illegal drug or used a prescription medication for non-medical reasons? Never Filed at: 10/09/2023 9902                Medical Decision Making  Patient is an 78-year-old female presenting for evaluation of right foot pain    Per chart review patient was admitted for CHF exacerbation and A-fib from 10/3/23-10/5/23. Likely injury occurred during rehab at that time if there is an injury. Will obtain x-rays of the foot ankle and fibula to assess for fractures. Final dispo pending but if patient cannot ambulate will need to be admitted for ambulatory dysfunction as would be unsafe to send the patient home as she cannot walk. Will treat symptomatically with Tylenol in the interim    Xrays negative for fracture    On reassessment pt states that she only has pain when the foot is touched or she tries to bear weight. Attempted to ambulate pt, she was unable to ambulate. Will admit for ambulatory dysfunction. Pt HD stable at time of admission     Amount and/or Complexity of Data Reviewed  Radiology: ordered and independent interpretation performed. Risk  OTC drugs. Decision regarding hospitalization. Disposition  Final diagnoses:    Foot pain, right   Ambulatory dysfunction     Time reflects when diagnosis was documented in both MDM as applicable and the Disposition within this note     Time User Action Codes Description Comment    10/9/2023  3:22 AM Mary Kate Baron Add [G55.517] Foot pain, right     10/9/2023  5:11 AM Mary Kate Baron Add [R26.2] Ambulatory dysfunction       ED Disposition     ED Disposition   Admit    Condition   Stable    Date/Time   Mon Oct 9, 2023  5:11 AM    Comment              Follow-up Information    None         Patient's Medications   Discharge Prescriptions    No medications on file     No discharge procedures on file. PDMP Review     None           ED Provider  Attending physically available and evaluated Fili Adwoa I managed the patient along with the ED Attending.     Electronically Signed by         Mary Kate Baron DO  10/09/23 6329

## 2023-10-09 NOTE — ASSESSMENT & PLAN NOTE
Last lipid panel in 2020 remarkable for slightly elevated triglycerides.  Currently takes 40 mg lipitor QD at home  Plan:  - Continue home Lipitor

## 2023-10-09 NOTE — PLAN OF CARE
Problem: PHYSICAL THERAPY ADULT  Goal: Performs mobility at highest level of function for planned discharge setting. See evaluation for individualized goals. Description: Treatment/Interventions: OT, Spoke to case management, Spoke to nursing, Gait training, Bed mobility, Patient/family training, Endurance training, LE strengthening/ROM, Functional transfer training  Equipment Recommended: Darroll Marking (pt reports she has been trying to obtain rollator)       See flowsheet documentation for full assessment, interventions and recommendations. Note: Prognosis: Good  Problem List: Decreased strength, Decreased endurance, Decreased range of motion, Impaired balance, Decreased mobility, Decreased coordination, Decreased cognition, Impaired judgement, Decreased safety awareness, Pain, Orthopedic restrictions  Assessment: Pt is 79 y.o. female seen for PT evaluation s/p admit to [address] [place] [address] on 10/9/2023 w/ Ambulatory dysfunction. PT consulted to assess pt's functional mobility and d/c needs. Order placed for PT eval and tx, w/ up w/ A order. Comorbidities affecting pt's physical performance at time of assessment include:  has a past medical history of Aortic valve stenosis, Aorto-iliac disease ([address]), Carotid stenosis, Cataract of right eye, Cataract, left eye, Chronic systolic CHF (congestive heart failure) ([address]), CKD (chronic kidney disease) stage 3, GFR 30-59 ml/min ([facility]), Coronary artery disease, Diabetes mellitus ([address]), Dyslipidemia, Gout, Hyperlipidemia, Hypertension, 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[address]), Irritable bladder, PAD (peripheral artery disease) ([address]), and Renal artery stenosis ([address] PTA, pt was ambulates community distances and elevations and lives in multi-level home.  Personal factors affecting pt at time of IE include: inaccessible home environment, ambulating w/ assistive device, stairs to enter home, communication issues, inability to ambulate household distances, inability to navigate level surfaces w/o external assistance, impulsivity, unable to perform physical activity, limited insight into impairments, inability to perform IADLs and inability to perform ADLs. Please find objective findings from PT assessment regarding body systems outlined above with impairments and limitations including weakness, decreased ROM, impaired balance, decreased endurance, impaired coordination, gait deviations, pain, decreased activity tolerance, decreased functional mobility tolerance, decreased safety awareness, impaired judgement, fall risk and decreased cognition. The following objective measures performed on IE also reveal limitations: The patient's AM-PAC Basic Mobility Inpatient Short Form Raw Score is 16, Standardized Score is 38.32. A standardized score less than 42.9 suggests the patient may benefit from discharge to post-acute rehabilitation services. Please also refer to the recommendation of the Physical Therapist for safe discharge planning. Pt's clinical presentation is currently unstable/unpredictable seen in pt's presentation of pain. Pt to benefit from continued PT tx to address deficits as defined above and maximize level of functional independent mobility and consistency. From PT/mobility standpoint, recommendation at time of d/c would be post acute rehabilitation services pending progress in order to facilitate return to PLOF. Barriers to Discharge: Decreased caregiver support, Inaccessible home environment     PT Discharge Recommendation: Post acute rehabilitation services    See flowsheet documentation for full assessment.

## 2023-10-09 NOTE — PLAN OF CARE
Problem: OCCUPATIONAL THERAPY ADULT  Goal: Performs self-care activities at highest level of function for planned discharge setting. See evaluation for individualized goals. Description: Treatment Interventions: ADL retraining, Functional transfer training, Endurance training, Patient/family training, Equipment evaluation/education, Compensatory technique education, Activityengagement          See flowsheet documentation for full assessment, interventions and recommendations. Note: Limitation: Decreased ADL status, Decreased endurance, Decreased self-care trans, Decreased high-level ADLs  Prognosis: Good  Assessment: Pt is a 79 y.o. female who was admitted to [facility] on 10/9/2023 with Ambulatory dysfunction - pt reports recent d/c from hospital -developed R foot pain and has been unable to ambulate since returning home - radiographs negative for fx or dislocation . Patient  has a past medical history of Aortic valve stenosis, Aorto-iliac disease ([address]), Carotid stenosis, Cataract of right eye, Cataract, left eye, Chronic systolic CHF (congestive heart failure) ([address]), CKD (chronic kidney disease) stage 3, GFR 30-59 ml/min ([address]), Coronary artery disease, Diabetes mellitus ([address]), Dyslipidemia, Gout, Hyperlipidemia, Hypertension, 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[address]), Irritable bladder, PAD (peripheral artery disease) ([address]), and Renal artery stenosis ([address]). At baseline pt was completing adls and mobility independently w/o adl - I iadls - shares homemaking with son however pt reports inability to walk since return home with need for increased support from son. Pt reports her son lives with her in multilevel home with 2nd floor bed/bath. Currently pt requires moderate assist for overall ADLS and moderate assist for functional mobility/transfers.  Pt currently presents with impairments in the following categories -steps to enter environment, limited home support, difficulty performing ADLS, difficulty performing IADLS  and environment activity tolerance, endurance and standing balance/tolerance. These impairments, as well as pt's fatigue, pain, decreased caregiver support, risk for falls and home environment  limit pt's ability to safely engage in all baseline areas of occupation, includingbathing, dressing, toileting, functional mobility/transfers, community mobility, laundry , driving, house maintenance, meal prep, cleaning, social participation  and leisure activities  From OT standpoint, recommend inpt rehab pending progress and ability to tolerate WB on R foot. OT will continue to follow to address the below stated goals.      OT Discharge Recommendation: Post acute rehabilitation services

## 2023-10-09 NOTE — PHYSICAL THERAPY NOTE
Physical Therapy Evaluation     Patient's Name: Holden Barnard    Admitting Diagnosis  Foot pain [M79.673]  Foot pain, right [M79.671]  Ambulatory dysfunction [R26.2]    Problem List  Patient Active Problem List   Diagnosis    Cataract, left eye    Cataract of right eye    Asymptomatic bilateral carotid artery stenosis    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HCC)    Demand ischemia of myocardium    Hyperlipidemia    Hypertension    CKD (chronic kidney disease) stage 3, GFR 30-59 ml/min (HCC)    Coronary artery disease    Non-ST elevation myocardial infarction (NSTEMI), type 2    Atrial fibrillation (HCC)    Intermittent claudication ([address])    Type 2 diabetes mellitus with chronic kidney disease, without long-term current use of insulin, unspecified CKD stage (HCC)    Elevated troponin    CHF exacerbation (HCC)    Respiratory insufficiency    Ambulatory dysfunction       Past Medical History  Past Medical History:   Diagnosis Date    Aortic valve stenosis     Aorto-iliac disease (HCC)     Carotid stenosis     Cataract of right eye 10/25/2016    Cataract, left eye 10/18/2016    Chronic systolic CHF (congestive heart failure) (HCC)     CKD (chronic kidney disease) stage 3, GFR 30-59 ml/min (HCC)     Coronary artery disease     Diabetes mellitus (HCC)     Type 2    Dyslipidemia     Gout     Hyperlipidemia     Hypertension     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    Irritable bladder     PAD (peripheral artery disease) (HCC)     Renal artery stenosis (HCC)     Right       Past Surgical History  Past Surgical History:   Procedure Laterality Date    ABDOMINAL AORTIC ANEURYSM REPAIR      AORTIC VALVE REPLACEMENT      CAROTID ENDARTERECTOMY Right     CATARACT EXTRACTION       SECTION      CORONARY ARTERY BYPASS GRAFT      CORONARY STENT PLACEMENT      CYSTOSCOPY      HYSTERECTOMY      MAMMO (HISTORICAL)  06/10/2019    MO TEAEC W/PATCH GRF CAROTID VERTB SUBCLAV NECK INC Right 2017    Procedure: CAROTID ENDARTERECTOMY WITH RETROGRADE AND INNOMINATE ARTERY STENTING;  Surgeon: Isha García MD;  [facility];  Service: Vascular    VA XCAPSL CTRC RMVL INSJ IO LENS PROSTH W/O ECP Right 10/25/2016    Procedure: OD EXTRACTION EXTRACAPSULAR CATARACT PHACO INTRAOCULAR LENS (IOL); Surgeon: Donya Sheriff MD;  [facility];  Service: Ophthalmology    VA XCAPSL CTRC RMVL INSJ IO LENS PROSTH W/O ECP Left 10/18/2016    Procedure: OS EXTRACTION EXTRACAPSULAR CATARACT PHACO INTRAOCULAR LENS (IOL); Surgeon: Donya Sheriff MD;  [facility];  Service: Ophthalmology    VASCULAR SURGERY            10/09/23 1125   PT Last Visit   PT Visit Date 10/09/23   Note Type   Note type Evaluation   Pain Assessment   Pain Assessment Tool 0-10   Pain Score 8   Pain Location/Orientation [facility]: [facility]   Hospital Pain Intervention(s) Repositioned; Ambulation/increased activity   Restrictions/Precautions   Weight Bearing Precautions Per Order No   Other Precautions Multiple lines;Pain   Home Living   Type of Home House   Home Layout Multi-level;Bed/bath upstairs   Bathroom Shower/Tub Tub/shower unit   Bathroom Toilet Standard   Bathroom Equipment Grab bars in shower   Prior Function   Level of [place] Independent with functional mobility   Lives With Son   [address] in the last 6 months 0   Vocational Retired   General   Family/Caregiver Present No   Subjective   Subjective Pt willing and agreeable to PT session   RLE Assessment   RLE Assessment WFL   LLE Assessment   LLE Assessment WFL   Coordination   Movements are Fluid and Coordinated 0   Coordination and Movement Description slow and guarded with pain   Bed Mobility   Supine to Sit 5  Supervision   Sit to Supine 5  Supervision   Transfers   Sit to Stand 3  Moderate assistance   Stand to Sit 3  Moderate assistance   Ambulation/Elevation   Gait pattern Excessively slow; Shuffling;Decreased foot clearance   Gait Assistance 3  Moderate assist   Additional items Assist x 1   Assistive Device Rolling walker   Distance 3'  (laterally EOB)   Balance   Static Sitting Fair +   Dynamic Sitting Fair   Static Standing Fair -   Dynamic Standing Poor +   Ambulatory Poor   Endurance Deficit   Endurance Deficit Yes   Endurance Deficit Description limited by pain, fatigue, weakness   Activity Tolerance   Activity Tolerance Patient limited by fatigue;Patient limited by pain   Medical Staff Made Aware OT for D/C planning   Assessment   Prognosis Good   Problem List Decreased strength;Decreased endurance;Decreased range of motion; Impaired balance;Decreased mobility; Decreased coordination;Decreased cognition; Impaired judgement;Decreased safety awareness;Pain;Orthopedic restrictions   Assessment Pt is 79 y.o. female seen for PT evaluation s/p admit to [facility] on 10/9/2023 w/ Ambulatory dysfunction. PT consulted to assess pt's functional mobility and d/c needs. Order placed for PT eval and tx, w/ up w/ A order. Comorbidities affecting pt's physical performance at time of assessment include:  has a past medical history of Aortic valve stenosis, Aorto-iliac disease ([address]), Carotid stenosis, Cataract of right eye, Cataract, left eye, Chronic systolic CHF (congestive heart failure) ([address]), CKD (chronic kidney disease) stage 3, GFR 30-59 ml/min ([facility]), Coronary artery disease, Diabetes mellitus ([address]), Dyslipidemia, Gout, Hyperlipidemia, Hypertension, 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[address]), Irritable bladder, PAD (peripheral artery disease) ([address]), and Renal artery stenosis ([address] PTA, pt was ambulates community distances and elevations and lives in multi-level home.  Personal factors affecting pt at time of IE include: inaccessible home environment, ambulating w/ assistive device, stairs to enter home, communication issues, inability to ambulate household distances, inability to navigate level surfaces w/o external assistance, impulsivity, unable to perform physical activity, limited insight into impairments, inability to perform IADLs and inability to perform ADLs. Please find objective findings from PT assessment regarding body systems outlined above with impairments and limitations including weakness, decreased ROM, impaired balance, decreased endurance, impaired coordination, gait deviations, pain, decreased activity tolerance, decreased functional mobility tolerance, decreased safety awareness, impaired judgement, fall risk and decreased cognition. The following objective measures performed on IE also reveal limitations: The patient's AM-PAC Basic Mobility Inpatient Short Form Raw Score is 16, Standardized Score is 38.32. A standardized score less than 42.9 suggests the patient may benefit from discharge to post-acute rehabilitation services. Please also refer to the recommendation of the Physical Therapist for safe discharge planning. Pt's clinical presentation is currently unstable/unpredictable seen in pt's presentation of pain. Pt to benefit from continued PT tx to address deficits as defined above and maximize level of functional independent mobility and consistency. From PT/mobility standpoint, recommendation at time of d/c would be post acute rehabilitation services pending progress in order to facilitate return to PLOF. Barriers to Discharge Decreased caregiver support; Inaccessible home environment   Goals   Patient Goals To decrease pain   STG Expiration Date 10/21/23   Short Term Goal #1 1. Complete bed mobility and transfers I to decrease need for caregiver in home. 2. Ambulate 300' I to complete household and community mobility without A. 3. Improve dynamic balance to good to decrease need for UE support during ambulation. 4. Be educated & demonstate 12 steps to be able to enter home without A. Plan   Treatment/Interventions OT; Spoke to case management;Spoke to nursing;Gait training;Bed mobility; Patient/family training; Endurance training;LE strengthening/ROM; Functional transfer training   PT Frequency 3-5x/wk   Discharge Recommendation   PT Discharge Recommendation Post acute rehabilitation services   Equipment Recommended Vi Chris  (pt reports she has been trying to obtain rollator)   [address] walker   [address]   Turning in Flat Bed Without Bedrails 3   Lying on Back to Sitting on Edge of Flat Bed Without Bedrails 3   Moving Bed to Chair 3   Standing Up From Chair Using Arms 3   Walk in Room 2   Climb 3-5 Stairs With Railing 2   Basic Mobility Inpatient Raw Score 16   Basic Mobility Standardized Score 38.32   Highest Level Of Mobility   JH-HLM Goal 5: Stand one or more mins   JH-HLM Achieved 5: Stand (1 or more minutes)         Gayathri Beauchamp, PT

## 2023-10-09 NOTE — ASSESSMENT & PLAN NOTE
In January 2017 went from sinus emerita to NSR to Afib RVR (after surgery).  CHADSVASC score is 7 > 11.2% risk    Plan:  - Continue home plavix, toprol 200 mg, lipitor, and aspirin  - Outpatient cardiology follow up

## 2023-10-09 NOTE — ASSESSMENT & PLAN NOTE
69-year-old female with a h/o CAD with CABG in 1996 s/p stent placement, HTN, hyperlipidemia, CHF, T2DM, bilateral carotid artery stenosis s/p R CEA and R innominate stent placement, and CKD stage III who presented via EMS on 10/9 with c/o R ankle pain and inability to bear weight on her R foot. She was recently admitted to the on 10/3/23 for CHF exacerbation. On her last day in the hospital on 10/5/23, she states that her ankle was accidentally injured prior to her PT evaluation. When the blankets were being removed, they wrapped around her foot and pulled her ankle. She was able to walk following the incident but reported pain at this time. After being discharged home, she was barely able to ambulate up the stairs due to the pain, requiring her son's help. Since 10/5, she says she has not ambulated and resorted to urinating in bed. At this time she has 0/10 pain while at rest. Reports significant pain with movement and contact. XR R foot and tibia fibula show no evidence of fracture. Colchicine started yesterday and with significant improvement in pain and ROM, patient may have gout causing ambulatory issues.      Plan:  - Will cont colchicine 0.6mg BID outpatient until gout has resolved    - PRN tylenol for pain  - PT recommends home with home PT

## 2023-10-09 NOTE — OCCUPATIONAL THERAPY NOTE
Occupational Therapy Evaluation     Patient Name: Jeff TOWNSEND Date: 10/9/2023  Problem List  Principal Problem:    Ambulatory dysfunction  Active Problems:    Hyperlipidemia    Hypertension    CKD (chronic kidney disease) stage 3, GFR 30-59 ml/min ([facility])    Coronary artery disease    Atrial fibrillation (HCC)    Type 2 diabetes mellitus with chronic kidney disease, without long-term current use of insulin, unspecified CKD stage [facility])    Past Medical History  Past Medical History:   Diagnosis Date    Aortic valve stenosis     Aorto-iliac disease ([address])     Carotid stenosis     Cataract of right eye 10/25/2016    Cataract, left eye 10/18/2016    Chronic systolic CHF (congestive heart failure) (HCC)     CKD (chronic kidney disease) stage 3, GFR 30-59 ml/min (HCC)     Coronary artery disease     Diabetes mellitus (HCC)     Type 2    Dyslipidemia     Gout     Hyperlipidemia     Hypertension     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    Irritable bladder     PAD (peripheral artery disease) ([address])     Renal artery stenosis (HCC)     Right     Past Surgical History  Past Surgical History:   Procedure Laterality Date    ABDOMINAL AORTIC ANEURYSM REPAIR      AORTIC VALVE REPLACEMENT      CAROTID ENDARTERECTOMY Right 2017    CATARACT EXTRACTION       SECTION      CORONARY ARTERY BYPASS GRAFT      CORONARY STENT PLACEMENT      CYSTOSCOPY      HYSTERECTOMY      MAMMO (HISTORICAL)  06/10/2019    MT TEAEC W/PATCH GRF CAROTID VERTB SUBCLAV NECK INC Right 2017    Procedure: CAROTID ENDARTERECTOMY WITH RETROGRADE AND INNOMINATE ARTERY STENTING;  Surgeon: Dennis Alejandro MD;  [facility];  Service: Vascular    MT XCAPSL CTRC RMVL INSJ IO LENS PROSTH W/O ECP Right 10/25/2016    Procedure: OD EXTRACTION EXTRACAPSULAR CATARACT PHACO INTRAOCULAR LENS (IOL);   Surgeon: Bhumi Beltran MD;  [facility];  Service: Ophthalmology    MT XCAPSL CTRC RMVL INSJ IO LENS PROSTH W/O ECP Left 10/18/2016 Procedure: OS EXTRACTION EXTRACAPSULAR CATARACT PHACO INTRAOCULAR LENS (IOL); Surgeon: Donya Sheriff MD;  [facility];  Service: Ophthalmology    VASCULAR SURGERY           10/09/23 1115   OT Last Visit   OT Visit Date 10/09/23   Note Type   Note type Evaluation   Pain Assessment   Pain Assessment Tool 0-10   Pain Score 8   Pain Location/Orientation Orientation: Right;Location: [facility]   Hospital Pain Intervention(s) Repositioned; Ambulation/increased activity; Emotional support;Relaxation technique   Restrictions/Precautions   Weight Bearing Precautions Per Order No   Other Precautions Multiple lines;Pain; Fall Risk   Home Living   Type of [address] [place] [address]  Multi-level;Bed/bath upstairs;Stairs to enter with rails   Bathroom Shower/Tub Tub/shower unit   Bathroom Toilet Standard   Bathroom Equipment Grab bars in shower   Prior Function   Level of [place] Independent with ADLs; Independent with functional mobility; Independent with IADLS   Lives With [place] Help From Family   IADLs Independent with driving; Independent with meal prep; Independent with medication management   Falls in the last 6 months 0   Vocational Retired   Lifestyle   Autonomy I adls and mobility w/o ad - denies falls - i iadls   Reciprocal Relationships supportive son however he works during the day and pt is alone   Service to Others retired   Intrinsic Gratification active pta   Subjective   Subjective "I just left here"   ADL   Eating Assistance 7  Independent   Grooming Assistance 5  Supervision/Setup    [address] 5  Supervision/Setup    [address] 3  Moderate Assistance   UB Dressing Assistance 5  Supervision/Setup   [address] Final 3  Moderate Assistance   [address]  3  Moderate Assistance   Bed Mobility   Supine to Sit 5  Supervision   Sit to Supine 5  Supervision   Transfers   Sit to Stand 3  Moderate assistance   Stand to Sit 3  Moderate assistance   Functional Mobility Functional Mobility 3  Moderate assistance   Additional Comments only able to tolerate taking a few side steps towards HOB 2* pain in R foot and inability to tolerate WB   Additional items Rolling walker   Balance   Static Sitting Fair +   Dynamic Sitting Fair   Static Standing Fair -   Dynamic Standing Poor +   Ambulatory Poor   Activity Tolerance   Activity Tolerance Patient limited by fatigue;Patient limited by pain;Treatment limited secondary to medical complications (Comment)   RUE Assessment   RUE Assessment WFL   LUE Assessment   LUE Assessment WFL   Cognition   Overall Cognitive Status WFL   Assessment   Limitation Decreased ADL status; Decreased endurance;Decreased self-care trans;Decreased high-level ADLs   Prognosis Good   Assessment Pt is a 79 y.o. female who was admitted to [address] [place] [address] on 10/9/2023 with Ambulatory dysfunction - pt reports recent d/c from hospital -developed R foot pain and has been unable to ambulate since returning home - radiographs negative for fx or dislocation . Patient  has a past medical history of Aortic valve stenosis, Aorto-iliac disease ([address]), Carotid stenosis, Cataract of right eye, Cataract, left eye, Chronic systolic CHF (congestive heart failure) ([address]), CKD (chronic kidney disease) stage 3, GFR 30-59 ml/min ([address]), Coronary artery disease, Diabetes mellitus ([address]), Dyslipidemia, Gout, Hyperlipidemia, Hypertension, 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[address]), Irritable bladder, PAD (peripheral artery disease) ([address]), and Renal artery stenosis ([address]). At baseline pt was completing adls and mobility independently w/o adl - I iadls - shares homemaking with son however pt reports inability to walk since return home with need for increased support from son. Pt reports her son lives with her in multilevel home with 2nd floor bed/bath. Currently pt requires moderate assist for overall ADLS and moderate assist for functional mobility/transfers.  Pt currently presents with impairments in the following categories -steps to enter environment, limited home support, difficulty performing ADLS, difficulty performing IADLS  and environment activity tolerance, endurance and standing balance/tolerance. These impairments, as well as pt's fatigue, pain, decreased caregiver support, risk for falls and home environment  limit pt's ability to safely engage in all baseline areas of occupation, includingbathing, dressing, toileting, functional mobility/transfers, community mobility, laundry , driving, house maintenance, meal prep, cleaning, social participation  and leisure activities  From OT standpoint, recommend inpt rehab pending progress and ability to tolerate WB on R foot. OT will continue to follow to address the below stated goals. Goals   Patient Goals have less pain   LTG Time Frame 10-14   Long Term Goal #1 refer to established goals below   Plan   Treatment Interventions ADL retraining;Functional transfer training; Endurance training;Patient/family training;Equipment evaluation/education; Compensatory technique education; Activityengagement   Goal Expiration Date 10/23/23   OT Frequency 2-3x/wk   Discharge Recommendation   OT Discharge Recommendation Post acute rehabilitation services   Additional Comments  if pain improves and pt is able to tolerate WB on R LE may progress to home   AM-PAC Daily Activity Inpatient   Lower Body Dressing 2   Bathing 2   Toileting 2   Upper Body Dressing 3   Grooming 4   Eating 4   Daily Activity Raw Score 17   Daily Activity Standardized Score (Calc for Raw Score >=11) 37.26   AM-PAC Applied Cognition Inpatient   Following a Speech/Presentation 4   Understanding Ordinary Conversation 4   Taking Medications 4   Remembering Where Things Are Placed or Put Away 4   Remembering List of 4-5 Errands 4   Taking Care of Complicated Tasks 4   Applied Cognition Raw Score 24   Applied Cognition Standardized Score 62.21   End of Consult   Education Provided Yes   Patient Position at End of Consult Supine; All needs within reach   Nurse Communication Nurse aware of consult       OCCUPATIONAL THERAPY GOALS:    *Mod I adls after setup with use of AE PRN  *Mod I toileting and clothing management   *Mod I functional mobility and transfers to/from all surfaces with good dynamic balance and safety for participation in dynamic adls and iadl tasks   *Demonstrate good carryover with safe use of RW during functional tasks   *Assess DME needs   *Increase activity tolerance to 35-40 minutes for participation in adls and enjoyable activities  *Assist with safe d/c recommendations             The patient's raw score on the -PAC Daily Activity Inpatient Short Form is 17. A raw score of less than 19 suggests the patient may benefit from discharge to post-acute rehabilitation services. Please refer to the recommendation of the Occupational Therapist for safe discharge planning.         [facility]

## 2023-10-09 NOTE — QUICK NOTE
Patient's primary  was called today.  did not  phone call. Voicemail was left, will try again tomorrow.

## 2023-10-09 NOTE — ED ATTENDING ATTESTATION
10/9/2023  IJacqueline MD, saw and evaluated the patient. I have discussed the patient with the resident/non-physician practitioner and agree with the resident's/non-physician practitioner's findings, Plan of Care, and MDM as documented in the resident's/non-physician practitioner's note, except where noted. All available labs and Radiology studies were reviewed. I was present for key portions of any procedure(s) performed by the resident/non-physician practitioner and I was immediately available to provide assistance. At this point I agree with the current assessment done in the Emergency Department. I have conducted an independent evaluation of this patient a history and physical is as follows:    ED Course  ED Course as of 10/09/23 0325   Mon Oct 09, 2023   0257 Per resident h&p 78 YO F presents for R foot pain for several days; unable to get out of bed; lives at her home with her some; recent hospitalization; no other complaints tonight; O: no skin changes no erythema; I/P XR; ambulatory dysfunction     Emergency Department Note- Oj Singh 79 y.o. female MRN: [id number]    Unit/Bed#: ED 26 Encounter: [id number]    Oj Singh is a 79 y.o. female who presents with   Chief Complaint   Patient presents with   • Foot Pain     Pt states she was recently discharged a couple days ago and states while here her right foot started hurting and it got worse after she was home so she wants it checked. No other complaints noted         History of Present Illness   HPI:  Oj Singh is a 79 y.o. female who presents for evaluation of:  Pain over the dorsum of her right foot and her right lateral ankle. Patient denies any trauma to the foot and the ankle. Patient was recently discharged from the hospital according to electronic medical record review for an admission for heart failure. Patient is unable to ambulate at her own home or get out of bed.   Movement and pressure in the foot provokes the discomfort. The pain is an aching sensation of moderate to severe intensity. Resting the foot somewhat alleviates the discomfort. Review of Systems   Constitutional: Negative for fatigue and fever. HENT: Negative for congestion and sore throat. Respiratory: Negative for cough and shortness of breath. Cardiovascular: Negative for chest pain and palpitations. Gastrointestinal: Negative for abdominal pain and nausea. Genitourinary: Negative for flank pain and frequency. Neurological: Negative for light-headedness and headaches. Psychiatric/Behavioral: Negative for dysphoric mood and hallucinations. All other systems reviewed and are negative.       Historical Information   Past Medical History:   Diagnosis Date   • Aortic valve stenosis    • Aorto-iliac disease ([address])    • Carotid stenosis    • Cataract of right eye 10/25/2016   • Cataract, left eye 10/18/2016   • Chronic systolic CHF (congestive heart failure) ([facility])    • CKD (chronic kidney disease) stage 3, GFR 30-59 ml/min ([facility])    • Coronary artery disease    • Diabetes mellitus ([facility])     Type 2   • Dyslipidemia    • Gout    • Hyperlipidemia    • Hypertension    • 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   • Irritable bladder    • PAD (peripheral artery disease) ([facility])    • Renal artery stenosis ([facility])     Right     Past Surgical History:   Procedure Laterality Date   • ABDOMINAL AORTIC ANEURYSM REPAIR     • AORTIC VALVE REPLACEMENT     • CAROTID ENDARTERECTOMY Right 2017   • CATARACT EXTRACTION     •  SECTION     • CORONARY ARTERY BYPASS GRAFT     • CORONARY STENT PLACEMENT     • CYSTOSCOPY     • HYSTERECTOMY     • MAMMO (HISTORICAL)  06/10/2019   • [place] TEAEC W/PATCH GRF CAROTID VERTB SUBCLAV NECK INC Right 2017    Procedure: CAROTID ENDARTERECTOMY WITH RETROGRADE AND INNOMINATE ARTERY STENTING;  Surgeon: Christiano Cardoso MD;  [facility];  Service: Vascular   • [place] XCAPSL CTRC RMVL INSJ IO LENS PROSTH W/O ECP Right 10/25/2016    Procedure: OD EXTRACTION EXTRACAPSULAR CATARACT PHACO INTRAOCULAR LENS (IOL); Surgeon: Jerrell Omalley MD;  Location[facility];  Service: Ophthalmology   • [place] XCAPSL CTRC RMVL INSJ IO LENS PROSTH W/O ECP Left 10/18/2016    Procedure: OS EXTRACTION EXTRACAPSULAR CATARACT PHACO INTRAOCULAR LENS (IOL); Surgeon: Jerrell Omalley MD;  Location[facility];  Service: Ophthalmology   • VASCULAR SURGERY       Social History   Social History     Substance and Sexual Activity   Alcohol Use No     Social History     Substance and Sexual Activity   Drug Use No     Social History     Tobacco Use   Smoking Status Former   • Packs/day: 0.50   • Years: 42.00   • Total pack years: 21.00   • Types: Cigarettes   • Start date:    • Quit date:    • Years since quittin.7   Smokeless Tobacco Never   Tobacco Comments    quit 20 years ago     Family History:   Family History   Problem Relation Age of Onset   • Diabetes Mother        Meds/Allergies   PTA meds:   Prior to Admission Medications   Prescriptions Last Dose Informant Patient Reported? Taking?   allopurinol (ZYLOPRIM) 300 mg tablet  Self No No   Sig: Take 1 tablet (300 mg total) by mouth daily   amLODIPine (NORVASC) 2.5 mg tablet   No No   Sig: Take 1 tablet (2.5 mg total) by mouth daily   aspirin 81 mg chewable tablet  Self No No   Sig: Chew 1 tablet daily for 30 days   atorvastatin (LIPITOR) 40 mg tablet   No No   Sig: Take 1 tablet (40 mg total) by mouth daily Do not start before 2023. clopidogrel (PLAVIX) 75 mg tablet  Self No No   Sig: Take 1 tablet (75 mg total) by mouth daily   furosemide (LASIX) 40 mg tablet   No No   Sig: Take 1 tablet (40 mg total) by mouth daily   isosorbide mononitrate (IMDUR) 30 mg 24 hr tablet   No No   Sig: Take 1 tablet (30 mg total) by mouth daily Do not start before 2023.    metFORMIN (GLUCOPHAGE) 500 mg tablet  Self No No   Sig: Take 1 tablet (500 mg total) by mouth daily   metoprolol succinate (TOPROL-XL) 200 MG 24 hr tablet   No No   Sig: Take 1 tablet (200 mg total) by mouth daily   nitroglycerin (NITROSTAT) 0.4 mg SL tablet  Self Yes No   Sig: Place 0.4 mg under the tongue every 5 (five) minutes as needed for chest pain      Facility-Administered Medications: None     Allergies   Allergen Reactions   • Medical Tape    • Other Rash     Pt states she is allergic to tape adhesive       Objective   First Vitals:   Blood Pressure: 155/80 (10/09/23 0226)  Pulse: 64 (10/09/23 0226)  Temperature: 97.5 °F (36.4 °C) (10/09/23 0226)  Temp Source: Oral (10/09/23 0226)  Respirations: 16 (10/09/23 0226)  SpO2: 97 % (10/09/23 0226)    Current Vitals:   Blood Pressure: 155/80 (10/09/23 0226)  Pulse: 64 (10/09/23 0226)  Temperature: 97.5 °F (36.4 °C) (10/09/23 0226)  Temp Source: Oral (10/09/23 0226)  Respirations: 16 (10/09/23 0226)  SpO2: 97 % (10/09/23 0226)    No intake or output data in the 24 hours ending 10/09/23 0325    Invasive Devices     None                 Physical Exam  Vitals and nursing note reviewed. Constitutional:       General: She is not in acute distress. Appearance: Normal appearance. She is well-developed. HENT:      Head: Normocephalic and atraumatic. Right Ear: External ear normal.      Left Ear: External ear normal.      Nose: Nose normal.      Mouth/Throat:      Pharynx: No oropharyngeal exudate. Eyes:      Conjunctiva/sclera: Conjunctivae normal.      Pupils: Pupils are equal, round, and reactive to light. Cardiovascular:      Rate and Rhythm: Normal rate and regular rhythm. Pulmonary:      Effort: Pulmonary effort is normal. No respiratory distress. Abdominal:      General: Abdomen is flat. There is no distension. Palpations: Abdomen is soft. Musculoskeletal:         General: No deformity. Normal range of motion. Cervical back: Normal range of motion and neck supple. Skin:     General: Skin is warm and dry.       Capillary Refill: Capillary refill takes less than 2 seconds. Neurological:      General: No focal deficit present. Mental Status: She is alert and oriented to person, place, and time. Mental status is at baseline. Coordination: Coordination normal.   Psychiatric:         Mood and Affect: Mood normal.         Behavior: Behavior normal.         Thought Content: Thought content normal.         Judgment: Judgment normal.           Medical Decision Makin. Acute foot and ankle discomfort atraumatic: Pain control; x-ray of the foot and the ankle to rule out fracture. Anticipate admission for ambulatory dysfunction. No results found for this or any previous visit (from the past 36 hour(s)). XR foot 3+ views RIGHT    (Results Pending)   XR ankle 3+ views RIGHT    (Results Pending)   XR tibia fibula 2 views RIGHT    (Results Pending)         Portions of the record may have been created with voice recognition software. Occasional wrong word or "sound a like" substitutions may have occurred due to the inherent limitations of voice recognition software. Read the chart carefully and recognize, using context, where substitutions have occurred.           Critical Care Time  Procedures

## 2023-10-09 NOTE — CASE MANAGEMENT
Case Management Progress Note    Patient name Fili Mejia  Location CW2 090/VG3 212-01 MRN [id number]  : 1940 Date 10/9/2023       LOS (days): 0  Geometric Mean LOS (GMLOS) (days):   Days to [facility]:        OBJECTIVE:        Current admission status: Inpatient  Preferred Pharmacy:   [address] [place] [address]  Phone: [phone] Fax: [phone]    Primary Care Provider: Antonia Donaldson MD    Primary Insurance: [facility] REP  Secondary Insurance:     PROGRESS NOTE: CM Student placed blanket referral in [facility] for STR.

## 2023-10-09 NOTE — CONSULTS
Podiatry - Consultation    Patient Information:   Jenn Beaver 79 y.o. female MRN: [id number]  Unit/Bed#: CW2 212-01 Encounter: [id number]  PCP: Hansel Verde MD  Date of Admission:  10/9/2023  Date of Consultation: 10/09/23  Requesting Physician: Hansel Verde MD      ASSESSMENT:    Jenn Beaver is a 79 y.o. female with:    1. Right Ankle pain  2. Type 2 diabetes mellitus   3. CKD  4. Ambulatory dysfunction     PLAN:    · Patient evaluated and treated, right ankle pain with no obvious deformity or deficit. Radiographs negative for fracture/dislocation. Recommend WBAT in low tide CAM boot. · Radiographs of right foot reviewed: No acute osseous abnormality. No fracture or dislocations in foot/ankle. · Elevation and offloading on green foam wedges or pillows when non-ambulatory. · Rest of care per primary team.  · Will discuss this plan with my attending and update as needed. Weightbearing status: Weightbearing as tolerated    SUBJECTIVE:    History of Present Illness:    Jenn Beaver is a 79 y.o. female who is originally admitted 10/9/2023 due to ambulatory dysfunction. Patient has a past medical history of hyperlipidemia, hypertension, CKD, coronary artery disease, atrial fibrillation, type 2 diabetes mellitus. We are consulted for right ankle pain x 1 week. Patient presented via EMS on 10/9 with c/o R ankle pain and inability to bear weight on her R foot. She was recently admitted to the on 10/3/23 for CHF exacerbation. On her last day in the hospital on 10/5/23, she states that her ankle was accidentally injured prior to her PT evaluation. When the blankets were being removed, they wrapped around her foot and pulled her ankle. After, she complained of significant pain in her foot/ankle and was unable to bear weight. She now complains of 10/10 sharp pain to the right ankle with pressure and weightbearing. She is unable to ambulate due to her pain.  She denies fever, chills, nausea, vomiting, shortness of breath at this time. Review of Systems:    Constitutional: Negative. HENT: Negative. Eyes: Negative. Respiratory: Negative. Cardiovascular: Negative. Gastrointestinal: Negative. Musculoskeletal: Right ankle/foot pain   Skin: Bruising right midfoot   Neurological: Negative    Psych: Negative. 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   • Irritable bladder    • PAD (peripheral artery disease) ([facility])    • Renal artery stenosis ([facility])     Right       Past Surgical History:   Procedure Laterality Date   • ABDOMINAL AORTIC ANEURYSM REPAIR     • AORTIC VALVE REPLACEMENT     • CAROTID ENDARTERECTOMY Right 2017   • CATARACT EXTRACTION     •  SECTION     • CORONARY ARTERY BYPASS GRAFT     • CORONARY STENT PLACEMENT     • CYSTOSCOPY     • HYSTERECTOMY     • MAMMO (HISTORICAL)  06/10/2019   • [place] TEAEC W/PATCH GRF CAROTID VERTB SUBCLAV NECK INC Right 2017    Procedure: CAROTID ENDARTERECTOMY WITH RETROGRADE AND INNOMINATE ARTERY STENTING;  Surgeon: Dennis Alejandro MD;  [facility];  Service: Vascular   • [place] XCAPSL CTRC RMVL INSJ IO LENS PROSTH W/O ECP Right 10/25/2016    Procedure: OD EXTRACTION EXTRACAPSULAR CATARACT PHACO INTRAOCULAR LENS (IOL); Surgeon: Bhumi Beltran MD;  [facility];  Service: Ophthalmology   • [place] XCAPSL CTRC RMVL INSJ IO LENS PROSTH W/O ECP Left 10/18/2016    Procedure: OS EXTRACTION EXTRACAPSULAR CATARACT PHACO INTRAOCULAR LENS (IOL);   Surgeon: Rory Corrigan Yuri Solares MD;  [facility];  Service: Ophthalmology   • VASCULAR SURGERY         Meds/Allergies:    Medications Prior to Admission   Medication   • allopurinol (ZYLOPRIM) 300 mg tablet   • amLODIPine (NORVASC) 2.5 mg tablet   • aspirin 81 mg chewable tablet   • atorvastatin (LIPITOR) 40 mg tablet   • clopidogrel (PLAVIX) 75 mg tablet   • furosemide (LASIX) 40 mg tablet   • isosorbide mononitrate (IMDUR) 30 mg 24 hr tablet   • metFORMIN (GLUCOPHAGE) 500 mg tablet   • metoprolol succinate (TOPROL-XL) 200 MG 24 hr tablet   • nitroglycerin (NITROSTAT) 0.4 mg SL tablet       Allergies   Allergen Reactions   • Medical Tape    • Other Rash     Pt states she is allergic to tape adhesive       Social History:     Marital Status:     Substance Use History:   Social History     Substance and Sexual Activity   Alcohol Use No     Social History     Tobacco Use   Smoking Status Former   • Packs/day: 0.50   • Years: 42.00   • Total pack years: 21.00   • Types: Cigarettes   • Start date:    • Quit date:    • Years since quittin.7   Smokeless Tobacco Never   Tobacco Comments    quit 20 years ago     Social History     Substance and Sexual Activity   Drug Use No       Family History:    Family History   Problem Relation Age of Onset   • Diabetes Mother          OBJECTIVE:    Vitals:   Blood Pressure: (!) 81/53 (10/09/23 0905)  Pulse: 59 (10/09/23 0905)  Temperature: 97.6 °F (36.4 °C) (10/09/23 0900)  Temp Source: Oral (10/09/23 0226)  Respirations: 19 (10/09/23 0859)  SpO2: 97 % (10/09/23 0905)    Physical Exam:    General Appearance: Alert, cooperative, no distress. HEENT: Head normocephalic, atraumatic, without obvious abnormality. Heart: Normal rate and rhythm. Lungs: Non-labored breathing. No respiratory distress. Abdomen: Without distension. Psychiatric: AAOx3  Lower Extremity:    Vascular:   DP: Right: 1+ Left: 1+  PT: Right: 1+ Left: 1+  CRT < 3 seconds at the digits.  +0/4 edema noted at bilateral lower extremities. Pedal hair is absent. Skin temperature is cool bilaterally. Musculoskeletal:  MMT is 4/5 in all muscle compartments bilaterally. ROM at the 1st MPJ and ankle joint are decreased bilaterally with the leg extended. Pain on palpation of right ankle. Pain with dorsiflexion and plantarflexion of the ankle on the right. Pain with palpation of the medial malleolus and proximal 2nd and 3rd metatarsal bases. No gross deformities noted. Dermatological:  - No wounds or lesions present in lower extremities bilaterally. - Ecchymosis along plantar midfoot on the right. Neurological:  Gross sensation is intact. Light touch is intact. Protective sensation is intact. Additional data:     Lab Results: I have personally reviewed pertinent labs including:    Results from last 7 days   Lab Units 10/09/23  0730 10/09/23  0539   WBC Thousand/uL  --  14.25*   HEMOGLOBIN g/dL  --  11.2*   HEMATOCRIT %  --  33.9*   PLATELETS Thousands/uL 286 299   NEUTROS PCT %  --  69   LYMPHS PCT %  --  20   MONOS PCT %  --  8   EOS PCT %  --  1     Results from last 7 days   Lab Units 10/09/23  0539 10/04/23  0649 10/03/23  0801   POTASSIUM mmol/L 3.4*   < > 3.7   CHLORIDE mmol/L 96   < > 103   CO2 mmol/L 25   < > 22   BUN mg/dL 36*   < > 18   CREATININE mg/dL 1.20   < > 1.02   CALCIUM mg/dL 9.0   < > 9.2   ALK PHOS U/L  --   --  92   ALT U/L  --   --  9   AST U/L  --   --  14    < > = values in this interval not displayed. Cultures: I have personally reviewed pertinent cultures including:              Imaging: I have personally reviewed pertinent reports in PACS. EKG, Pathology, and Other Studies: I have personally reviewed pertinent reports. Time Spent for Care: 30 minutes. More than 50% of total time spent on counseling and coordination of care as described above. ** Please Note: Portions of the record may have been created with voice recognition software.  Occasional wrong word or "sound a like" substitutions may have occurred due to the inherent limitations of voice recognition software. Read the chart carefully and recognize, using context, where substitutions have occurred.  **

## 2023-10-09 NOTE — H&P
INTERNAL MEDICINE RESIDENCY ADMISSION H&P     Name: Renzo Garcia   Age & Sex: 79 y.o. female   MRN: [id number]  Unit/Bed#: ED 26   Encounter: [id number]  Primary Care Provider: Sallie Morgan MD    Code Status: Level 1 - Full Code  Admission Status: OBSERVATION  Disposition: Patient requires Med/Surg    Admit to team: SOD Team B     ASSESSMENT/PLAN     Principal Problem:    Ambulatory dysfunction  Active Problems:    Hyperlipidemia    Hypertension    CKD (chronic kidney disease) stage 3, GFR 30-59 ml/min ([facility])    Coronary artery disease    Atrial fibrillation ([address])    Type 2 diabetes mellitus with chronic kidney disease, without long-term current use of insulin, unspecified CKD stage ([address])      * Ambulatory dysfunction  Assessment & Plan  79-year-old female with a h/o CAD with CABG in 1996 s/p stent placement, HTN, hyperlipidemia, CHF, T2DM, bilateral carotid artery stenosis s/p R CEA and R innominate stent placement, and CKD stage III who presented via EMS on 10/9 with c/o R ankle pain and inability to bear weight on her R foot. She was recently admitted to the on 10/3/23 for CHF exacerbation. On her last day in the hospital on 10/5/23, she states that her ankle was accidentally injured prior to her PT evaluation. When the blankets were being removed, they wrapped around her foot and pulled her ankle. She was able to walk following the incident but reported pain at this time. After being discharged home, she was barely able to ambulate up the stairs due to the pain, requiring her son's help. Since 10/5, she says she has not ambulated and resorted to urinating in bed. At this time she has 0/10 pain while at rest. Reports significant pain with movement and contact. XR R foot and tibia fibula show no evidence of fracture.     Plan:  - PRN tylenol for pain  - PT/OT eval     Type 2 diabetes mellitus with chronic kidney disease, without long-term current use of insulin, unspecified CKD stage ([address])  Assessment & Plan  Lab Results   Component Value Date    HGBA1C 6.7 (H) 10/03/2023       No results for input(s): "POCGLU" in the last 72 hours. Plan:  - Holding home med: metFORMIN (GLUCOPHAGE) 500 mg tablet daily  - Start SSI algorithm 3    Atrial fibrillation [facility])  Assessment & Plan  In January 2017 went from sinus emerita to NSR to Afib RVR (after surgery). CHADSVASC score is 7 > 11.2% risk    Plan:  - Continue home plavix, toprol 200 mg, lipitor, and aspirin  - Outpatient cardiology follow up     Coronary artery disease  Assessment & Plan  History of CAD s/p CABG '96, PCI/stent '12, 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 Currently taking 81 mg aspirin QD and 75 mg plavix QD and isosorbide mononitrate (IMDUR) 30 mg 24 hr tablet daily at home. Plan:  - Continue aspirin, plavix, lipitor  - Continue imdur      CKD (chronic kidney disease) stage 3, GFR 30-59 ml/min ([facility])  Assessment & Plan  Wt Readings from Last 3 Encounters:   10/05/23 80.8 kg (178 lb 2.1 oz)   05/22/23 83.3 kg (183 lb 9.6 oz)   01/20/23 82.6 kg (182 lb)     Recent admission for CHF exacerbation on 10/3. Most recent echo showed moderate to severe asymmetric hypertrophy of the septal wall. The left ventricular ejection fraction is 55%. Systolic function is normal. Diastolic function is moderately abnormal, consistent with grade II (pseudonormal) relaxation. The following segments are akinetic: basal inferior and mid inferolateral.    Plan:  - Continue home  furosemide (LASIX) 40 mg tablet daily  - Monitor daily weights and I/Os  - Cardiac diet    Hypertension  Assessment & Plan  History of hypertension, home meds include amlodipine 2.5 QD and toprol  mg QD    Plan:  - Continue home meds amlodipine 2.5 mg QD and toprol  mg QD    Hyperlipidemia  Assessment & Plan  Last lipid panel in 2020 remarkable for slightly elevated triglycerides.  Currently takes 40 mg lipitor QD at home  Plan:  - Continue home Lipitor       VTE Pharmacologic Prophylaxis: Heparin  VTE Mechanical Prophylaxis: reason for no mechanical VTE prophylaxis RLE pain    CHIEF COMPLAINT     Chief Complaint   Patient presents with   • Foot Pain     Pt states she was recently discharged a couple days ago and states while here her right foot started hurting and it got worse after she was home so she wants it checked. No other complaints noted      HISTORY OF PRESENT ILLNESS     Patient is a 50-year-old female with a h/o CAD with CABG in 1996 s/p stent placement, HTN, hyperlipidemia, CHF, T2DM, bilateral carotid artery stenosis s/p R CEA and R innominate stent placement, and CKD stage III who presented via EMS on 10/9 with c/o R ankle pain and inability to bear weight on her R foot. She was recently admitted to the on 10/3/23 for CHF exacerbation. On her last day in the hospital on 10/5/23, she states that her ankle was accidentally injured prior to her PT evaluation. When the blankets were being removed, they wrapped around her foot and pulled her ankle. She was able to walk following the incident but reported pain at this time. After being discharged home, she was barely able to ambulate up the stairs due to the pain, requiring her son's help. Since 10/5, she says she has not ambulated and resorted to urinating in bed. At this time she has 0/10 pain while at rest. Reports significant pain with movement and contact.       REVIEW OF SYSTEMS     Review of Systems   Constitutional: Negative for chills and fever. Respiratory: Negative for shortness of breath. Cardiovascular: Negative for chest pain, palpitations and leg swelling. Musculoskeletal: Positive for gait problem (Unable to bear weight on R foot).         R ankle pain     OBJECTIVE     Vitals:    10/09/23 0226 10/09/23 0415 10/09/23 0700   BP: 155/80 (!) 140/43 151/50   BP Location: Left arm  Left arm   Pulse: 64 88 66   Resp: 16  16   Temp: 97.5 °F (36.4 °C)     TempSrc: Oral     SpO2: 97% 98% 95%      Temperature:   Temp (24hrs), Av.5 °F (36.4 °C), Min:97.5 °F (36.4 °C), Max:97.5 °F (36.4 °C)    Temperature: 97.5 °F (36.4 °C)  Intake & Output:  I/O     None        Weights: There is no height or weight on file to calculate BMI. Weight (last 2 days)     None        Physical Exam  Constitutional:       General: She is not in acute distress. Appearance: Normal appearance. She is obese. She is not ill-appearing. HENT:      Head: Normocephalic and atraumatic. Right Ear: External ear normal.      Left Ear: External ear normal.      Nose: Nose normal.      Mouth/Throat:      Pharynx: Oropharynx is clear. Eyes:      Extraocular Movements: Extraocular movements intact. Conjunctiva/sclera: Conjunctivae normal.      Pupils: Pupils are equal, round, and reactive to light. Cardiovascular:      Rate and Rhythm: Normal rate and regular rhythm. Pulses: Normal pulses. Heart sounds: Normal heart sounds. Pulmonary:      Effort: Pulmonary effort is normal.      Breath sounds: Normal breath sounds. Abdominal:      Palpations: Abdomen is soft. Tenderness: There is no abdominal tenderness. Musculoskeletal:         General: Swelling (R lateral malleolus) and tenderness (R lateral malleolus) present. Cervical back: Normal range of motion and neck supple. Skin:     General: Skin is warm and dry. Findings: No bruising or erythema. Neurological:      General: No focal deficit present. Mental Status: She is alert and oriented to person, place, and time. Mental status is at baseline.        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   • Irritable bladder    • PAD (peripheral artery disease) ([facility])    • Renal artery stenosis ([address])     Right     PAST SURGICAL HISTORY     Past Surgical History:   Procedure Laterality Date   • ABDOMINAL AORTIC ANEURYSM REPAIR     • AORTIC VALVE REPLACEMENT     • CAROTID ENDARTERECTOMY Right 2017   • CATARACT EXTRACTION     •  SECTION     • CORONARY ARTERY BYPASS GRAFT     • CORONARY STENT PLACEMENT     • CYSTOSCOPY     • HYSTERECTOMY     • MAMMO (HISTORICAL)  06/10/2019   • AL TEAEC W/PATCH GRF CAROTID VERTB SUBCLAV NECK INC Right 2017    Procedure: CAROTID ENDARTERECTOMY WITH RETROGRADE AND INNOMINATE ARTERY STENTING;  Surgeon: Tavo Pastor MD;  Location: [facility];  Service: Vascular   • AL XCAPSL CTRC RMVL INSJ IO LENS PROSTH W/O ECP Right 10/25/2016    Procedure: OD EXTRACTION EXTRACAPSULAR CATARACT PHACO INTRAOCULAR LENS (IOL); Surgeon: Brittany Chin MD;  Location: [facility];  Service: Ophthalmology   • AL XCAPSL CTRC RMVL INSJ IO LENS PROSTH W/O ECP Left 10/18/2016    Procedure: OS EXTRACTION EXTRACAPSULAR CATARACT PHACO INTRAOCULAR LENS (IOL); Surgeon: Brittany Chin MD;  Location: [facility];  Service: Ophthalmology   • VASCULAR SURGERY       SOCIAL & FAMILY HISTORY     Social History     Substance and Sexual Activity   Alcohol Use No       Social History     Substance and Sexual Activity   Drug Use No     Social History     Tobacco Use   Smoking Status Former   • Packs/day: 0.50   • Years: 42.00   • Total pack years: 21.00   • Types: Cigarettes   • Start date: 5   • Quit date: 18   • Years since quittin.7   Smokeless Tobacco Never   Tobacco Comments    quit 20 years ago     Family History   Problem Relation Age of Onset   • Diabetes Mother      LABORATORY DATA     Labs: I have personally reviewed pertinent reports.     Results from last 7 days   Lab Units 10/09/23  0539 10/05/23  0434 10/04/23  0649 10/03/23  0801   WBC Thousand/uL 14.25* 10.22* 12.01* 19.98*   HEMOGLOBIN g/dL 11.2* 10.8* 11.0* 12.1   HEMATOCRIT % 33.9* 33.9* 34.6* 38.5   PLATELETS Thousands/uL 299 238 230 315   NEUTROS PCT % 69 62  --   --    MONOS PCT % 8 7  --   --    MONO PCT %  --   --   --  5   EOS PCT % 1 4  --  5      Results from last 7 days   Lab Units 10/09/23  0539 10/05/23  0434 10/04/23  0649 10/03/23  0801   POTASSIUM mmol/L 3.4* 3.6 3.6 3.7   CHLORIDE mmol/L 96 99 101 103   CO2 mmol/L 25 30 26 22   BUN mg/dL 36* 25 21 18   CREATININE mg/dL 1.20 1.25 1.14 1.02   CALCIUM mg/dL 9.0 9.0 9.1 9.2   ALK PHOS U/L  --   --   --  92   ALT U/L  --   --   --  9   AST U/L  --   --   --  14     Results from last 7 days   Lab Units 10/04/23  0649 10/03/23  0801   MAGNESIUM mg/dL 2.0 1.6*                      Micro:  No results found for: "Laban Francis", "URINECX", "WOUNDCULT", "SPUTUMCULTUR"  IMAGING & DIAGNOSTIC TESTS     Imaging: I have personally reviewed pertinent reports. No results found. EKG, Pathology, and Other Studies: I have personally reviewed pertinent reports. ALLERGIES     Allergies   Allergen Reactions   • Medical Tape    • Other Rash     Pt states she is allergic to tape adhesive     MEDICATIONS PRIOR TO ARRIVAL     Prior to Admission medications    Medication Sig Start Date End Date Taking?  Authorizing Provider   allopurinol (ZYLOPRIM) 300 mg tablet Take 1 tablet (300 mg total) by mouth daily 3/29/23   Kari Arciniega MD   amLODIPine (NORVASC) 2.5 mg tablet Take 1 tablet (2.5 mg total) by mouth daily 10/6/23 1/4/24  Kari Arciniega MD   aspirin 81 mg chewable tablet Chew 1 tablet daily for 30 days 2/1/17 5/22/23  Micheal Parada MD   atorvastatin (LIPITOR) 40 mg tablet Take 1 tablet (40 mg total) by mouth daily Do not start before October 6, 2023. 10/6/23   Sahara Fernandez MD   clopidogrel (PLAVIX) 75 mg tablet Take 1 tablet (75 mg total) by mouth daily 1/30/23   Ryan Mcclain Don Garza MD   furosemide (LASIX) 40 mg tablet Take 1 tablet (40 mg total) by mouth daily 10/5/23   Eder Srivastava MD   isosorbide mononitrate (IMDUR) 30 mg 24 hr tablet Take 1 tablet (30 mg total) by mouth daily Do not start before October 6, 2023. 10/6/23   Eder Srivastava MD   metFORMIN (GLUCOPHAGE) 500 mg tablet Take 1 tablet (500 mg total) by mouth daily 4/14/23   Nargis Mills MD   metoprolol succinate (TOPROL-XL) 200 MG 24 hr tablet Take 1 tablet (200 mg total) by mouth daily 10/6/23   Nargis Mills MD   nitroglycerin (NITROSTAT) 0.4 mg SL tablet Place 0.4 mg under the tongue every 5 (five) minutes as needed for chest pain    Historical Provider, MD     MEDICATIONS ADMINISTERED IN LAST 24 HOURS     Medication Administration - last 24 hours from 10/08/2023 0726 to 10/09/2023 6988       Date/Time Order Dose Route Action Action by     10/09/2023 0403 EDT acetaminophen (TYLENOL) tablet 650 mg 650 mg Oral Given Solange Rosa RN        CURRENT MEDICATIONS     Current Facility-Administered Medications   Medication Dose Route Frequency Provider Last Rate   • allopurinol  300 mg Oral Daily Daniel Weeks MD     • amLODIPine  2.5 mg Oral Daily Daniel Weeks MD     • aspirin  81 mg Oral Daily Daniel Weeks MD     • atorvastatin  40 mg Oral Daily With Nellie Anne MD     • clopidogrel  75 mg Oral Daily Daniel Weeks MD     • furosemide  40 mg Oral Daily Daniel Weeks MD     • heparin (porcine)  5,000 Units Subcutaneous Q8H [address]  Daniel Weeks MD     • insulin lispro  1-6 Units Subcutaneous TID AC Daniel Weeks MD     • isosorbide mononitrate  30 mg Oral Daily Daniel Weeks MD     • metoprolol succinate  200 mg Oral Daily Daniel Weeks MD               Admission Time  I spent 30 minutes admitting the patient. This involved direct patient contact where I performed a full history and physical, reviewing previous records, and reviewing laboratory and other diagnostic studies.     Portions of the record may have been created with voice recognition software. Occasional wrong word or "sound a like" substitutions may have occurred due to the inherent limitations of voice recognition software.   Read the chart carefully and recognize, using context, where substitutions have occurred.    ==  Nany Gastelum MD  [address] [facility]  Internal Medicine Residency PGY-1

## 2023-10-09 NOTE — RESTORATIVE TECHNICIAN NOTE
Restorative Technician Note      Patient Name: Oj Lipoma     Restorative Tech Visit Date: 10/09/23  Note Type: Bracing, Initial consult  Patient Position Upon Consult: Supine  Brace Applied: Cam Walker Boot Standard (Short medium)  Patient Position When Brace Applied: Other (comment)  Education Provided: Yes  Patient Position at End of Consult: Supine;  All needs within reach  Nurse Communication: Nurse aware of consult, application of brace    Madeline Walsh, Restorative Technician

## 2023-10-09 NOTE — CASE MANAGEMENT
Case Management Assessment & Discharge Planning Note    Patient name Jeff Rivas  Location CW2 967/KL6 [id number] MRN [id number]  : 1940 Date 10/9/2023       Current Admission Date: 10/9/2023  Current Admission Diagnosis:Ambulatory dysfunction   Patient Active Problem List    Diagnosis Date Noted   • Ambulatory dysfunction 10/09/2023   • Elevated troponin 10/03/2023   • CHF exacerbation ([address]) 10/03/2023   • Respiratory insufficiency 10/03/2023   • Type 2 diabetes mellitus with chronic kidney disease, without long-term current use of insulin, unspecified CKD stage ([address]) 2022   • Intermittent claudication ([address]) 2019   • Atrial fibrillation ([address]) 2017   • Non-ST elevation myocardial infarction (NSTEMI), type 2 2017   • Hyperlipidemia    • Hypertension    • CKD (chronic kidney disease) stage 3, GFR 30-59 ml/min ([facility])    • Coronary artery disease    • Demand ischemia of myocardium 2017   • Asymptomatic bilateral carotid artery stenosis 2017   • 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[address]) 2017   • Cataract of right eye 10/25/2016   • Cataract, left eye 10/18/2016      LOS (days): 0  Geometric Mean LOS (GMLOS) (days):   Days to GMLOS:     OBJECTIVE:  PATIENT READMITTED TO HOSPITAL  Risk of Unplanned Readmission Score: 14.14     Current admission status: Inpatient      Preferred Pharmacy:   [address]  [address] [place] [address] [facility] [address] [address]  Phone: [phone] Fax: [phone]    Primary Care Provider: Janet Alexander MD    Primary Insurance: [facility]  Secondary Insurance:     ASSESSMENT:  [place] Proxies    There are no active Health Care Proxies on file.          Readmission Root Cause  30 Day Readmission: Yes  Who directed you to return to the hospital?: Self  Did you understand whom to contact if you had questions or problems?: Yes  Did you get your prescriptions before you left the hospital?: No  Reason[de-identified]  (patient stated she did not get her prescriptions until 10/8 following her d/c due to waiting on "verification from the doctor")  Were you able to get your prescriptions filled when you left the hospital?: Yes  Did you take your medications as prescribed?: No  Were you able to get to your follow-up appointments?: No  Reason[de-identified]  (patient stated her follow-up appointment was scheduled for this wednesday 10/11)  During previous admission, was a post-acute recommendation made?: No  Patient was readmitted due to: Foot pain    Patient Information  Admitted from[de-identified] Home  Mental Status: Alert  During Assessment patient was accompanied by: Not accompanied during assessment  Assessment information provided by[de-identified] Patient  Primary Caregiver: Self  Support Systems: Children, [address] of Residence: [address] [place] [address] do you live in?: [employer] entry access options.  Select all that apply.: Stairs  Number of steps to enter home.: 4  Do the steps have railings?: Yes  Type of Current Residence: 3 story home  Upon entering residence, is there a bedroom on the main floor (no further steps)?: No  A bedroom is located on the following floor levels of residence (select all that apply):: 2nd Floor  Upon entering residence, is there a bathroom on the main floor (no further steps)?: No  Indicate which floors of current residence have a bathroom (select all the apply):: 2nd Floor  Number of steps to 2nd floor from main floor: One Flight  In the last 12 months, was there a time when you were not able to pay the mortgage or rent on time?: No  In the last 12 months, how many places have you lived?: 1  In the last 12 months, was there a time when you did not have a steady place to sleep or slept in a shelter (including now)?: No  Homeless/housing insecurity resource given?: N/A  Living Arrangements: Lives w/ Son  Is patient a ?: No    Activities of Daily Living Prior to Admission  Functional Status: Independent  Completes ADLs independently?: Yes  Ambulates independently?: Yes  Does patient use assisted devices?: Yes  Assisted Devices (DME) used: Bedside Commode  Does patient currently own DME?: Yes  What DME does the patient currently own?: Bedside Commode  Does patient have a history of Outpatient Therapy (PT/OT)?: Yes (OP PT/OT following open heart surgery over 25 years ago)  Does the patient have a history of Short-Term Rehab?: No  Does patient have a history of HHC?: No  Does patient currently have [facility]?: No    Patient Information Continued  Income Source: Pension/USP  Does patient have prescription coverage?: Yes  Within the past 12 months, you worried that your food would run out before you got the money to buy more.: Never true  Within the past 12 months, the food you bought just didn't last and you didn't have money to get more.: Never true  Food insecurity resource given?: N/A  Does patient receive dialysis treatments?: No  Does patient have a history of substance abuse?: No  Does patient have a history of Mental Health Diagnosis?: No    Means of Transportation  Means of Transport to Appts[de-identified] Drives Self  In the past 12 months, has lack of transportation kept you from medical appointments or from getting medications?: No  In the past 12 months, has lack of transportation kept you from meetings, work, or from getting things needed for daily living?: No  Was application for public transport provided?: N/A        DISCHARGE DETAILS:    Discharge planning discussed with[de-identified] Patient at bedside  Freedom of Choice: Yes     CM contacted family/caregiver?: No- see comments (patients son arrived towards end of assessment)    Contacts  Patient Contacts: [place]  Relationship to Patient[de-identified] Family  Contact Method:  In Person  Reason/Outcome: Continuity of Care      Would you like to participate in our [address] service program?  : Yes    Additional Comments: CM Student met with patient at bedside for MIRELA assessment. Patient is a readmit and was unable to get her prescriptions filled until 10/8 due to waiting on "verification from the doctor". Per patient, she has a follow-up appointment this Wednesday 10/11 which has been cancelled by the doctor. Patient states she lives with her son, Reid Borden, who arrived at bedside towards the end of CM assessment. Patient became very tearful and shared that she has been  for 26 years. Patient has no hx of HHC, SNF, rehab. CM Student ensured there were no further questions from the patient or her son at this time. CM and CM Student to be available as needed for d/c.     CM reviewed d/c planning process including the following: identifying help at home, patient preference for d/c planning needs, Discharge Lounge, [facility] Meds to Bed program, availability of treatment team to discuss questions or concerns patient and/or family may have regarding understanding medications and recognizing signs and symptoms once discharged. CM also encouraged patient to follow up with all recommended appointments after discharge. Patient advised of importance for patient and family to participate in managing patient’s medical well being.

## 2023-10-09 NOTE — ASSESSMENT & PLAN NOTE
History of hypertension, home meds include amlodipine 2.5 QD and toprol  mg QD    Plan:  - Continue home meds amlodipine 2.5 mg QD and toprol  mg QD

## 2023-10-09 NOTE — QUICK NOTE
Internal Medicine Quick Note     Subjective:   Patient having pain on R ankle and slightly on the dorsum of midfoot. No pain over toes or other articulations. Feels well , no SOB, fevers, coughs, chills, chest pain. Objective:  BP / 50-70s, HR 60-70s, afebrile, On 97% room air. On repeat blood pressure 150/60 on left arm (Right arm always with low BP readings)  Physical Exam  Constitutional:       General: She is not in acute distress. Appearance: Normal appearance. HENT:      Head: Normocephalic. Nose: Nose normal.      Mouth/Throat:      Pharynx: Oropharynx is clear. Eyes:      Pupils: Pupils are equal, round, and reactive to light. Cardiovascular:      Rate and Rhythm: Normal rate and regular rhythm. Pulses: Normal pulses. Heart sounds: No murmur heard. Pulmonary:      Effort: Pulmonary effort is normal. No respiratory distress. Breath sounds: Rales present. No wheezing. Abdominal:      General: Abdomen is flat. Bowel sounds are normal. There is no distension. Tenderness: There is no abdominal tenderness. Musculoskeletal:      Cervical back: Normal range of motion. Comments: Pain over the R ankle -- exquisitely tender to touch. No erythema or swelling noted compared to L ankle. Metatarsophalangeal joints are nontender to palpation. Dorsum of midfoot slightly tender on R foot   Skin:     General: Skin is warm. Capillary Refill: Capillary refill takes less than 2 seconds. Neurological:      General: No focal deficit present. Mental Status: She is alert. Psychiatric:         Mood and Affect: Mood normal.       A/P:    Patient is a 79F with PMH of CAD s/p CABG/stent, HTN, HLD, CHF EF 55% with G2DD, DM2, carotid stenosis s/p R CEA  presenting for ambulatory dysfunciton due to R ankle pain.   - Concern for RLE strain vs gout (in setting of recent inpatient diuresis)   - Check uric acid levels, start colchicine (consider prednisone if GFR worsens)  - PT OT for RLE pain   - CXR from 10/3/23 prior admission -- with pulmonary edema, vascular congestion and base hazy -- likely edema but PNA not excluded. Will repeat CXR today in setting of leukocytosis. - repeat vitals q4h; held antihypertensives this morning in setting of lower BP; asked RN to repeat vitals. - Addendum: /60s on left arm. Patient should not obtain BP readings on R arm due to low readings at baseline.  May resume morning lasix and antihypertensives now, scheduled dose at 3pm.

## 2023-10-09 NOTE — ASSESSMENT & PLAN NOTE
History of CAD s/p CABG '96, PCI/stent '12, 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 Currently taking 81 mg aspirin QD and 75 mg plavix QD and isosorbide mononitrate (IMDUR) 30 mg 24 hr tablet daily at home.      Plan:  - Continue aspirin, plavix, lipitor  - Continue imdur

## 2023-10-10 LAB
ALBUMIN SERPL BCP-MCNC: 3.4 G/DL (ref 3.5–5)
ALP SERPL-CCNC: 70 U/L (ref 34–104)
ALT SERPL W P-5'-P-CCNC: 19 U/L (ref 7–52)
ANION GAP SERPL CALCULATED.3IONS-SCNC: 9 MMOL/L
AST SERPL W P-5'-P-CCNC: 21 U/L (ref 13–39)
BASOPHILS # BLD AUTO: 0.07 THOUSANDS/ÂΜL (ref 0–0.1)
BASOPHILS NFR BLD AUTO: 1 % (ref 0–1)
BILIRUB SERPL-MCNC: 0.64 MG/DL (ref 0.2–1)
BUN SERPL-MCNC: 36 MG/DL (ref 5–25)
CALCIUM ALBUM COR SERPL-MCNC: 9.7 MG/DL (ref 8.3–10.1)
CALCIUM SERPL-MCNC: 9.2 MG/DL (ref 8.4–10.2)
CHLORIDE SERPL-SCNC: 99 MMOL/L (ref 96–108)
CO2 SERPL-SCNC: 28 MMOL/L (ref 21–32)
CREAT SERPL-MCNC: 1.12 MG/DL (ref 0.6–1.3)
EOSINOPHIL # BLD AUTO: 0.27 THOUSAND/ÂΜL (ref 0–0.61)
EOSINOPHIL NFR BLD AUTO: 2 % (ref 0–6)
ERYTHROCYTE [DISTWIDTH] IN BLOOD BY AUTOMATED COUNT: 14.4 % (ref 11.6–15.1)
GFR SERPL CREATININE-BSD FRML MDRD: 45 ML/MIN/1.73SQ M
GLUCOSE SERPL-MCNC: 117 MG/DL (ref 65–140)
GLUCOSE SERPL-MCNC: 160 MG/DL (ref 65–140)
GLUCOSE SERPL-MCNC: 171 MG/DL (ref 65–140)
GLUCOSE SERPL-MCNC: 174 MG/DL (ref 65–140)
GLUCOSE SERPL-MCNC: 201 MG/DL (ref 65–140)
HCT VFR BLD AUTO: 34.3 % (ref 34.8–46.1)
HGB BLD-MCNC: 11.1 G/DL (ref 11.5–15.4)
IMM GRANULOCYTES # BLD AUTO: 0.05 THOUSAND/UL (ref 0–0.2)
IMM GRANULOCYTES NFR BLD AUTO: 0 % (ref 0–2)
LYMPHOCYTES # BLD AUTO: 2.9 THOUSANDS/ÂΜL (ref 0.6–4.47)
LYMPHOCYTES NFR BLD AUTO: 25 % (ref 14–44)
MAGNESIUM SERPL-MCNC: 1.8 MG/DL (ref 1.9–2.7)
MCH RBC QN AUTO: 29.1 PG (ref 26.8–34.3)
MCHC RBC AUTO-ENTMCNC: 32.4 G/DL (ref 31.4–37.4)
MCV RBC AUTO: 90 FL (ref 82–98)
MONOCYTES # BLD AUTO: 0.88 THOUSAND/ÂΜL (ref 0.17–1.22)
MONOCYTES NFR BLD AUTO: 8 % (ref 4–12)
NEUTROPHILS # BLD AUTO: 7.25 THOUSANDS/ÂΜL (ref 1.85–7.62)
NEUTS SEG NFR BLD AUTO: 64 % (ref 43–75)
NRBC BLD AUTO-RTO: 0 /100 WBCS
PHOSPHATE SERPL-MCNC: 4.1 MG/DL (ref 2.3–4.1)
PLATELET # BLD AUTO: 306 THOUSANDS/UL (ref 149–390)
PMV BLD AUTO: 10.6 FL (ref 8.9–12.7)
POTASSIUM SERPL-SCNC: 3.9 MMOL/L (ref 3.5–5.3)
PROT SERPL-MCNC: 6.8 G/DL (ref 6.4–8.4)
RBC # BLD AUTO: 3.82 MILLION/UL (ref 3.81–5.12)
SODIUM SERPL-SCNC: 136 MMOL/L (ref 135–147)
WBC # BLD AUTO: 11.42 THOUSAND/UL (ref 4.31–10.16)

## 2023-10-10 PROCEDURE — 99232 SBSQ HOSP IP/OBS MODERATE 35: CPT | Performed by: INTERNAL MEDICINE

## 2023-10-10 PROCEDURE — 97530 THERAPEUTIC ACTIVITIES: CPT

## 2023-10-10 PROCEDURE — 85025 COMPLETE CBC W/AUTO DIFF WBC: CPT

## 2023-10-10 PROCEDURE — 83735 ASSAY OF MAGNESIUM: CPT

## 2023-10-10 PROCEDURE — 82948 REAGENT STRIP/BLOOD GLUCOSE: CPT

## 2023-10-10 PROCEDURE — 97116 GAIT TRAINING THERAPY: CPT

## 2023-10-10 PROCEDURE — 84100 ASSAY OF PHOSPHORUS: CPT

## 2023-10-10 PROCEDURE — 80053 COMPREHEN METABOLIC PANEL: CPT

## 2023-10-10 RX ORDER — MAGNESIUM SULFATE HEPTAHYDRATE 40 MG/ML
2 INJECTION, SOLUTION INTRAVENOUS ONCE
Status: COMPLETED | OUTPATIENT
Start: 2023-10-10 | End: 2023-10-10

## 2023-10-10 RX ADMIN — COLCHICINE 0.6 MG: 0.6 TABLET ORAL at 09:27

## 2023-10-10 RX ADMIN — METOPROLOL SUCCINATE 200 MG: 100 TABLET, EXTENDED RELEASE ORAL at 09:27

## 2023-10-10 RX ADMIN — ALLOPURINOL 300 MG: 300 TABLET ORAL at 09:27

## 2023-10-10 RX ADMIN — COLCHICINE 0.6 MG: 0.6 TABLET ORAL at 17:30

## 2023-10-10 RX ADMIN — INSULIN LISPRO 1 UNITS: 100 INJECTION, SOLUTION INTRAVENOUS; SUBCUTANEOUS at 09:27

## 2023-10-10 RX ADMIN — ASPIRIN 81 MG CHEWABLE TABLET 81 MG: 81 TABLET CHEWABLE at 09:27

## 2023-10-10 RX ADMIN — INSULIN LISPRO 2 UNITS: 100 INJECTION, SOLUTION INTRAVENOUS; SUBCUTANEOUS at 17:30

## 2023-10-10 RX ADMIN — FUROSEMIDE 40 MG: 40 TABLET ORAL at 09:27

## 2023-10-10 RX ADMIN — AMLODIPINE BESYLATE 2.5 MG: 2.5 TABLET ORAL at 09:27

## 2023-10-10 RX ADMIN — ATORVASTATIN CALCIUM 40 MG: 40 TABLET, FILM COATED ORAL at 17:30

## 2023-10-10 RX ADMIN — CLOPIDOGREL BISULFATE 75 MG: 75 TABLET, FILM COATED ORAL at 09:27

## 2023-10-10 RX ADMIN — HEPARIN SODIUM 5000 UNITS: 5000 INJECTION INTRAVENOUS; SUBCUTANEOUS at 05:20

## 2023-10-10 RX ADMIN — HEPARIN SODIUM 5000 UNITS: 5000 INJECTION INTRAVENOUS; SUBCUTANEOUS at 21:49

## 2023-10-10 RX ADMIN — ISOSORBIDE MONONITRATE 30 MG: 30 TABLET, EXTENDED RELEASE ORAL at 09:27

## 2023-10-10 RX ADMIN — HEPARIN SODIUM 5000 UNITS: 5000 INJECTION INTRAVENOUS; SUBCUTANEOUS at 15:04

## 2023-10-10 RX ADMIN — MAGNESIUM SULFATE HEPTAHYDRATE 2 G: 40 INJECTION, SOLUTION INTRAVENOUS at 10:49

## 2023-10-10 RX ADMIN — INSULIN LISPRO 1 UNITS: 100 INJECTION, SOLUTION INTRAVENOUS; SUBCUTANEOUS at 12:25

## 2023-10-10 NOTE — PLAN OF CARE
Problem: PHYSICAL THERAPY ADULT  Goal: Performs mobility at highest level of function for planned discharge setting. See evaluation for individualized goals. Description: Treatment/Interventions: OT, Spoke to case management, Spoke to nursing, Gait training, Bed mobility, Patient/family training, Endurance training, LE strengthening/ROM, Functional transfer training  Equipment Recommended: Mikaela Camilo (pt reports she has been trying to obtain rollator)       See flowsheet documentation for full assessment, interventions and recommendations. Outcome: Progressing  Note: Prognosis: Good  Problem List: Decreased strength, Decreased endurance, Decreased range of motion, Impaired balance, Decreased mobility, Decreased coordination, Decreased cognition, Impaired judgement, Decreased safety awareness, Pain, Orthopedic restrictions  Assessment: The patient is demonstrating notable improvement today as she notes no pain in her foot today. Her CAM boot was donned without any pain, and she was readily able to transfer. The patient had improving gait as she became accostomed to the CAM boot, and she required no assistance throughout. She had no loss of balance even with dynamic head and trunk movements. She was able to transfer on and off the chair, bed, and toilet without assistance. The patient feels comfortable returning home as her pain was resolved today. Will continue to follow. Barriers to Discharge: None     PT Discharge Recommendation: Home with home health rehabilitation    See flowsheet documentation for full assessment.

## 2023-10-10 NOTE — PROGRESS NOTES
INTERNAL MEDICINE RESIDENCY PROGRESS NOTE     Name: Kyle Cain   Age & Sex: 79 y.o. female   MRN: [id number]  Unit/Bed#: -01   Encounter: [id number]  Team: SOD Team B     PATIENT INFORMATION     Name: Kyle Cain   Age & Sex: 79 y.o. female   MRN: [id number]  Hospital Stay Days: 1    ASSESSMENT/PLAN     Principal Problem:    Ambulatory dysfunction  Active Problems:    Hyperlipidemia    Hypertension    CKD (chronic kidney disease) stage 3, GFR 30-59 ml/min (HCC)    Coronary artery disease    Atrial fibrillation ([address])    Type 2 diabetes mellitus with chronic kidney disease, without long-term current use of insulin, unspecified CKD stage ([address])      Type 2 diabetes mellitus with chronic kidney disease, without long-term current use of insulin, unspecified CKD stage ([address])  Assessment & Plan  Lab Results   Component Value Date    HGBA1C 6.7 (H) 10/03/2023       No results for input(s): "POCGLU" in the last 72 hours. Plan:  - Holding home med: metFORMIN (GLUCOPHAGE) 500 mg tablet daily  - Start SSI algorithm 3    Atrial fibrillation [facility])  Assessment & Plan  In January 2017 went from sinus emerita to NSR to Afib RVR (after surgery). CHADSVASC score is 7 > 11.2% risk    Plan:  - Continue home plavix, toprol 200 mg, lipitor, and aspirin  - Outpatient cardiology follow up     Coronary artery disease  Assessment & Plan  History of CAD s/p CABG '96, PCI/stent '12, 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 Currently taking 81 mg aspirin QD and 75 mg plavix QD and isosorbide mononitrate (IMDUR) 30 mg 24 hr tablet daily at home. Plan:  - Continue aspirin, plavix, lipitor  - Continue imdur      CKD (chronic kidney disease) stage 3, GFR 30-59 ml/min ([facility])  Assessment & Plan  Wt Readings from Last 3 Encounters:   10/05/23 80.8 kg (178 lb 2.1 oz)   05/22/23 83.3 kg (183 lb 9.6 oz)   01/20/23 82.6 kg (182 lb)     Recent admission for CHF exacerbation on 10/3.  Most recent echo showed moderate to severe asymmetric hypertrophy of the septal wall. The left ventricular ejection fraction is 55%. Systolic function is normal. Diastolic function is moderately abnormal, consistent with grade II (pseudonormal) relaxation. The following segments are akinetic: basal inferior and mid inferolateral.    Plan:  - Continue home  furosemide (LASIX) 40 mg tablet daily  - Monitor daily weights and I/Os  - Cardiac diet    Hypertension  Assessment & Plan  History of hypertension, home meds include amlodipine 2.5 QD and toprol  mg QD    Plan:  - Continue home meds amlodipine 2.5 mg QD and toprol  mg QD    Hyperlipidemia  Assessment & Plan  Last lipid panel in 2020 remarkable for slightly elevated triglycerides. Currently takes 40 mg lipitor QD at home  Plan:  - Continue home Lipitor     * Ambulatory dysfunction  Assessment & Plan  62-year-old female with a h/o CAD with CABG in 1996 s/p stent placement, HTN, hyperlipidemia, CHF, T2DM, bilateral carotid artery stenosis s/p R CEA and R innominate stent placement, and CKD stage III who presented via EMS on 10/9 with c/o R ankle pain and inability to bear weight on her R foot. She was recently admitted to the on 10/3/23 for CHF exacerbation. On her last day in the hospital on 10/5/23, she states that her ankle was accidentally injured prior to her PT evaluation. When the blankets were being removed, they wrapped around her foot and pulled her ankle. She was able to walk following the incident but reported pain at this time. After being discharged home, she was barely able to ambulate up the stairs due to the pain, requiring her son's help. Since 10/5, she says she has not ambulated and resorted to urinating in bed. At this time she has 0/10 pain while at rest. Reports significant pain with movement and contact. XR R foot and tibia fibula show no evidence of fracture.   Colchicine started yesterday and with significant improvement in pain and ROM, patient may have gout causing ambulatory issues. Plan:  - Cont colchicine 0.6mg BID   - PRN tylenol for pain  - PT/OT eval       Disposition: Continue inpatient management      SUBJECTIVE     Patient seen and examined. No acute events overnight. Patient feels well. Denies any complaints. States her right ankle feels significantly better after starting the colchicine yesterday. She has not been able to get out of bed today but has a boot at bedside and states that she will definitely try with the nurse today. She thinks that her ambulation will be better based on the amount of pain in the ankle. Patient is very hesitant to go to acute rehab as she would like to go home. Patient understands the need for going to rehab and would like to think about things before making a decision. Patient has a friend who works at a certain rehab facility that she would ideally like to go to but does not remember the name and will find out. Cm working on potential placement     OBJECTIVE     Vitals:    10/09/23 1654 10/09/23 1943 10/09/23 2239 10/10/23 0700   BP: 153/53  155/65 151/59   BP Location: Left arm      Pulse: 66 57 78 71   Resp:   18 18   Temp: 97.9 °F (36.6 °C)  98 °F (36.7 °C) 97.5 °F (36.4 °C)   TempSrc: Oral      SpO2: 96% 93% 94% 94%      Temperature:   Temp (24hrs), Av.8 °F (36.6 °C), Min:97.5 °F (36.4 °C), Max:98 °F (36.7 °C)    Temperature: 97.5 °F (36.4 °C)  Intake & Output:  I/O       10/08 0701  10/09 0700 10/09 0701  10/10 0700 10/10 0701  10/11 0700    P. O.  330 120    Total Intake  330 120    Urine  800     Total Output  800     Net  -470 +120               Weights: There is no height or weight on file to calculate BMI. Weight (last 2 days)     None        Physical Exam  Vitals and nursing note reviewed. Constitutional:       General: She is not in acute distress. Appearance: She is well-developed. HENT:      Head: Normocephalic and atraumatic.    Eyes:      Conjunctiva/sclera: Conjunctivae normal.   Cardiovascular: Rate and Rhythm: Normal rate and regular rhythm. Heart sounds: No murmur heard. Pulmonary:      Effort: Pulmonary effort is normal. No respiratory distress. Breath sounds: Normal breath sounds. Abdominal:      Palpations: Abdomen is soft. Tenderness: There is no abdominal tenderness. Musculoskeletal:         General: No swelling, tenderness or deformity. Normal range of motion. Cervical back: Neck supple. Right lower leg: No edema. Left lower leg: No edema. Skin:     General: Skin is warm and dry. Capillary Refill: Capillary refill takes less than 2 seconds. Neurological:      Mental Status: She is alert. Psychiatric:         Mood and Affect: Mood normal.       LABORATORY DATA     Labs: I have personally reviewed pertinent reports. Results from last 7 days   Lab Units 10/10/23  0507 10/09/23  0730 10/09/23  0539 10/05/23  0434   WBC Thousand/uL 11.42*  --  14.25* 10.22*   HEMOGLOBIN g/dL 11.1*  --  11.2* 10.8*   HEMATOCRIT % 34.3*  --  33.9* 33.9*   PLATELETS Thousands/uL 306 286 299 238   NEUTROS PCT % 64  --  69 62   MONOS PCT % 8  --  8 7   EOS PCT % 2  --  1 4      Results from last 7 days   Lab Units 10/10/23  0507 10/09/23  0539 10/05/23  0434   POTASSIUM mmol/L 3.9 3.4* 3.6   CHLORIDE mmol/L 99 96 99   CO2 mmol/L 28 25 30   BUN mg/dL 36* 36* 25   CREATININE mg/dL 1.12 1.20 1.25   CALCIUM mg/dL 9.2 9.0 9.0   ALK PHOS U/L 70  --   --    ALT U/L 19  --   --    AST U/L 21  --   --      Results from last 7 days   Lab Units 10/10/23  0507 10/04/23  0649   MAGNESIUM mg/dL 1.8* 2.0     Results from last 7 days   Lab Units 10/10/23  0507   PHOSPHORUS mg/dL 4.1                    IMAGING & DIAGNOSTIC TESTING     Radiology Results: I have personally reviewed pertinent reports. XR chest portable    Result Date: 10/9/2023  Impression: Persistent borderline cardiomegaly with decreased perihilar congestive changes compared to 10/3/2023.  There is no lobar consolidation or pleural effusion. Workstation performed: [id number]     XR foot 3+ views RIGHT    Result Date: 10/9/2023  Impression: No acute osseous abnormality. Workstation performed: [id number]     XR tibia fibula 2 views RIGHT    Result Date: 10/9/2023  Impression: No acute osseous abnormality. Workstation performed: [id number]     Other Diagnostic Testing: I have personally reviewed pertinent reports. ACTIVE MEDICATIONS     Current Facility-Administered Medications   Medication Dose Route Frequency   • allopurinol (ZYLOPRIM) tablet 300 mg  300 mg Oral Daily   • amLODIPine (NORVASC) tablet 2.5 mg  2.5 mg Oral Daily   • aspirin chewable tablet 81 mg  81 mg Oral Daily   • atorvastatin (LIPITOR) tablet 40 mg  40 mg Oral Daily With Dinner   • clopidogrel (PLAVIX) tablet 75 mg  75 mg Oral Daily   • colchicine (COLCRYS) tablet 0.6 mg  0.6 mg Oral BID   • furosemide (LASIX) tablet 40 mg  40 mg Oral Daily   • heparin (porcine) subcutaneous injection 5,000 Units  5,000 Units Subcutaneous Q8H [facility]   • insulin lispro (HumaLOG) 100 units/mL subcutaneous injection 1-6 Units  1-6 Units Subcutaneous TID AC   • isosorbide mononitrate (IMDUR) 24 hr tablet 30 mg  30 mg Oral Daily   • magnesium sulfate 2 g/50 mL IVPB (premix) 2 g  2 g Intravenous Once   • metoprolol succinate (TOPROL-XL) 24 hr tablet 200 mg  200 mg Oral Daily       VTE Pharmacologic Prophylaxis: Heparin  VTE Mechanical Prophylaxis: sequential compression device    Portions of the record may have been created with voice recognition software. Occasional wrong word or "sound a like" substitutions may have occurred due to the inherent limitations of voice recognition software.   Read the chart carefully and recognize, using context, where substitutions have occurred.  ==  Aneesh Farr MD  [id number]  Internal Medicine Residency PGY-2

## 2023-10-10 NOTE — PLAN OF CARE
Problem: MOBILITY - ADULT  Goal: Maintain or return to baseline ADL function  Description: INTERVENTIONS:  -  Assess patient's ability to carry out ADLs; assess patient's baseline for ADL function and identify physical deficits which impact ability to perform ADLs (bathing, care of mouth/teeth, toileting, grooming, dressing, etc.)  - Assess/evaluate cause of self-care deficits   - Assess range of motion  - Assess patient's mobility; develop plan if impaired  - Assess patient's need for assistive devices and provide as appropriate  - Encourage maximum independence but intervene and supervise when necessary  - Involve family in performance of ADLs  - Assess for home care needs following discharge   - Consider OT consult to assist with ADL evaluation and planning for discharge  - Provide patient education as appropriate  Outcome: Progressing  Goal: Maintains/Returns to pre admission functional level  Description: INTERVENTIONS:  - Perform BMAT or MOVE assessment daily.   - Set and communicate daily mobility goal to care team and patient/family/caregiver.      - Out of bed for toileting  - Record patient progress and toleration of activity level   Outcome: Progressing

## 2023-10-10 NOTE — UTILIZATION REVIEW
Initial Clinical Review    Observation 10/9 @ 0612 and changed to Inpatient sttaus on 10/9 @ 1147. Pt requiring continued stay for Right ankle pain, Ambulatory dysfunction, Pain control and safe d/c pain. Admission: Date/Time/Statement:   Admission Orders (From admission, onward)       Ordered        10/09/23 1147  Inpatient Admission  Once            10/09/23 0612  Place in Observation  Once                          Orders Placed This Encounter   Procedures    Inpatient Admission     Standing Status:   Standing     Number of Occurrences:   1     Order Specific Question:   Level of Care     Answer:   Med Surg [16]     Order Specific Question:   Estimated length of stay     Answer:   More than 2 Midnights     Order Specific Question:   Certification     Answer:   I certify that inpatient services are medically necessary for this patient for a duration of greater than two midnights. See H&P and MD Progress Notes for additional information about the patient's course of treatment. ED Arrival Information       Expected   -    Arrival   10/9/2023 02:20    Acuity   Less Urgent              Means of arrival   Ambulance    Escorted by   [facility]    Service   Hospitalist    Admission type   Emergency              Arrival complaint   Foot Pain             Chief Complaint   Patient presents with    Foot Pain     Pt states she was recently discharged a couple days ago and states while here her right foot started hurting and it got worse after she was home so she wants it checked. No other complaints noted       Initial Presentation: 79 y.o. female to ED presents via EMS for Right ankle pain and inability to bear weight on her R foot. Recently hospitalized from 10/3 to 10/5/2023 for CHF exacerbation. On her last day in the hospital on 10/5/23, she states that her ankle was accidentally injured prior to her PT evaluation. When the blankets were being removed, they wrapped around her foot and pulled her ankle. She was able to walk following the incident but reported pain at this time. After being discharged home, she was barely able to ambulate up the stairs due to the pain, requiring her son's help. Since 10/5, she says she has not ambulated and resorted to urinating in bed. At this time she has 0/10 pain while at rest. Reports significant pain with movement and contact. PMH for CAD with CABG in 1996 s/p stent placement, HTN, hyperlipidemia, CHF, T2DM, bilateral carotid artery stenosis s/p R CEA and R innominate stent placement, and CKD stage III. Admit Inpatient level of care for Right ankle pain and Ambulatory dysfunction. Imaging. Podiatry consult. Pain control. PT/OT eval. Continue home meds. 10/9  Podiatry cons; Right Ankle pain. Right ankle pain with no obvious deformity or deficit. Radiographs negative for fracture/dislocation. Recommend WBAT in low tide CAM boot. Date: 10/10  Day 2:  Progress notes; Continue colchicine 0/6 mg Bid. Colchicine started yesterday and with significant improvement in pain and ROM, patient may have gout causing ambulatory issues. Continue home plavix, toprol 200 mg, lipitor, and aspirin. Pain control. Pt/OT eval     Date: 10/11    Day 3: Has surpassed a 2nd midnight with active treatments and services, which include Right ankle pain, Ambulatory dysfunction, Pain control and safe d/c plan.       ED Triage Vitals [10/09/23 0226]   Temperature Pulse Respirations Blood Pressure SpO2   97.5 °F (36.4 °C) 64 16 155/80 97 %      Temp Source Heart Rate Source Patient Position - Orthostatic VS BP Location FiO2 (%)   Oral Monitor Lying Left arm --      Pain Score       10 - Worst Possible Pain          Wt Readings from Last 1 Encounters:   10/05/23 80.8 kg (178 lb 2.1 oz)     Additional Vital Signs:   10/10/23 07:00:12 97.5 °F (36.4 °C) 71 18 151/59 90 94 % -- --   10/09/23 22:39:08 98 °F (36.7 °C) 78 18 155/65 95 94 % -- --   10/09/23 19:43:13 -- 57 -- -- -- 93 % -- --   10/09/23 16:54:55 97.9 °F (36.6 °C) 66 -- 153/53 86 96 % None (Room air)      /09/23 0905 -- 59 -- 81/53   Abnormal  62   Abnormal  97 % None (Room air) Lying   10/09/23 0900 97.6 °F (36.4 °C) 72 -- 87/54   Abnormal  65 95 % -- Lying   10/09/23 08:59:25 97.6 °F (36.4 °C) 68 19 87/54 A  bnormal  65 96 % -- Lying   10/09/23 0830 -- 60 -- 109/52 86 97 % --      Pertinent Labs/Diagnostic Test Results:   XR chest portable   Final Result by Angella Yates MD (10/09 1443)      Persistent borderline cardiomegaly with decreased perihilar congestive changes compared to 10/3/2023. There is no lobar consolidation or pleural effusion. Workstation performed: [id number]         XR foot 3+ views RIGHT   ED Interpretation by Zay Orourke DO (10/09 0419)   Wet read - no fracture or dislocation       Final Result by Ebony Wilder MD (10/09 5772)      No acute osseous abnormality. Workstation performed: [id number]         XR tibia fibula 2 views RIGHT   ED Interpretation by Zay Orourke DO (10/09 0419)   Wet read - no fracture or dislocation       Final Result by Ebony Wilder MD (10/09 7753)      No acute osseous abnormality.             Workstation performed: [id number]             Lab Units 10/10/23  0507 10/09/23  0730 10/09/23  0539   WBC Thousand/uL 11.42*  --  14.25*   HEMOGLOBIN g/dL 11.1*  --  11.2*   HEMATOCRIT % 34.3*  --  33.9*   PLATELETS Thousands/uL 306 286 299   NEUTROS ABS Thousands/µL 7.25  --  9.90*         Lab Units 10/10/23  0507 10/09/23  0539   SODIUM mmol/L 136 136   POTASSIUM mmol/L 3.9 3.4*   CHLORIDE mmol/L 99 96   CO2 mmol/L 28 25   ANION GAP mmol/L 9 15   BUN mg/dL 36* 36*   CREATININE mg/dL 1.12 1.20   EGFR ml/min/1.73sq m 45 41   CALCIUM mg/dL 9.2 9.0   MAGNESIUM mg/dL 1.8*  --    PHOSPHORUS mg/dL 4.1  --      Results from last 7 days   Lab Units 10/10/23  0507   AST U/L 21   ALT U/L 19   ALK PHOS U/L 70   TOTAL PROTEIN g/dL 6.8   ALBUMIN g/dL 3.4*   TOTAL BILIRUBIN mg/dL 0.64 Lab Units 10/10/23  0759 10/09/23  1653 10/09/23  1108 10/09/23  0729   POC GLUCOSE mg/dl 174* 153* 177* 159*     Lab Units 10/10/23  0507 10/09/23  0539   GLUCOSE RANDOM mg/dL 117 150*       Lab Units 10/03/23  1235   HS TNI 2HR ng/L  --    HSTNI D2 ng/L  --    HS TNI 4HR ng/L 2,631*   HSTNI D4 ng/L 2,475*         Results from last 7 days   Lab Units 10/09/23  1405   BNP pg/mL 934*       Results from last 7 days   Lab Units 10/09/23  1404   BLOOD CULTURE  Received in Microbiology Lab. Culture in Progress. Received in Microbiology Lab. Culture in Progress.        ED Treatment:   Medication Administration from 10/09/2023 0220 to 10/09/2023 0847         Date/Time Order Dose Route Action     10/09/2023 0403 EDT acetaminophen (TYLENOL) tablet 650 mg 650 mg Oral Given     10/09/2023 4888 EDT allopurinol (ZYLOPRIM) tablet 300 mg 300 mg Oral Given     10/09/2023 0831 EDT aspirin chewable tablet 81 mg 81 mg Oral Given     10/09/2023 4097 EDT clopidogrel (PLAVIX) tablet 75 mg 75 mg Oral Given     10/09/2023 0724 EDT heparin (porcine) subcutaneous injection 5,000 Units 5,000 Units Subcutaneous Given     10/09/2023 0804 EDT insulin lispro (HumaLOG) 100 units/mL subcutaneous injection 1-6 Units 1 Units Subcutaneous Given     10/09/2023 0840 EDT potassium chloride (K-DUR,KLOR-CON) CR tablet 40 mEq 40 mEq Oral Given          Past Medical History:   Diagnosis Date    Aortic valve stenosis     Aorto-iliac disease ([address])     Carotid stenosis     Cataract of right eye 10/25/2016    Cataract, left eye 10/18/2016    Chronic systolic CHF (congestive heart failure) ([facility])     CKD (chronic kidney disease) stage 3, GFR 30-59 ml/min ([facility])     Coronary artery disease     Diabetes mellitus ([address])     Type 2    Dyslipidemia     Gout     Hyperlipidemia     Hypertension     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 female     Admission Orders:  Scheduled Medications:  allopurinol, 300 mg, Oral, Daily  amLODIPine, 2.5 mg, Oral, Daily  aspirin, 81 mg, Oral, Daily  atorvastatin, 40 mg, Oral, Daily With Dinner  clopidogrel, 75 mg, Oral, Daily  colchicine, 0.6 mg, Oral, BID  furosemide, 40 mg, Oral, Daily  heparin (porcine), 5,000 Units, Subcutaneous, Q8H WILLIAM  insulin lispro, 1-6 Units, Subcutaneous, TID AC  isosorbide mononitrate, 30 mg, Oral, Daily  magnesium sulfate, 2 g, Intravenous, Once  metoprolol succinate, 200 mg, Oral, Daily      Continuous IV Infusions: None     PRN Meds: None       IP CONSULT TO CASE MANAGEMENT  IP CONSULT TO PODIATRY    Network Utilization Review Department  ATTENTION: Please call with any questions or concerns to [phone] and carefully listen to the prompts so that you are directed to the right person. All voicemails are confidential.   For Discharge needs, contact Care Management DC Support Team at [phone] opt. 2  Send all requests for admission clinical reviews, approved or denied determinations and any other requests to dedicated fax number below belonging to the campus where the patient is receiving treatment.  List of dedicated fax numbers for the Facilities:  [place] DENIALS (Administrative/Medical Necessity) [phone]   DISCHARGE SUPPORT TEAM (NETWORK) [address] (Maternity/NICU/Pediatrics) [address] [place] [address] [phone]   [facility] [place] [address] [address] [facility] [address] [address] [address]  [phone]

## 2023-10-10 NOTE — PHYSICAL THERAPY NOTE
Physical Therapy Progress Note     10/10/23 1205   PT Last Visit   PT Visit Date 10/10/23   Note Type   Note Type Treatment   Pain Assessment   Pain Assessment Tool 0-10   Pain Score No Pain   Restrictions/Precautions   Other Precautions Fall Risk   Subjective   Subjective The patient states that she feels better today with no real pain in her foot. She states that she feels that she is able to return home. Bed Mobility   Supine to Sit 5  Supervision   Additional items Increased time required   Sit to Supine 5  Supervision   Additional items Increased time required   Transfers   Sit to Stand 5  Supervision   Additional items Increased time required   Stand to Sit 5  Supervision   Ambulation/Elevation   Gait pattern Excessively slow; Short stride;Decreased foot clearance   Gait Assistance 5  Supervision   Additional items Verbal cues   Assistive Device Rolling walker   Distance 70 feet, 60 feet, 50 feet. Balance   Static Sitting Good   Dynamic Sitting Fair   Static Standing Fair   Dynamic Standing Fair   Ambulatory Fair   Activity Tolerance   Activity Tolerance Patient tolerated treatment well   Assessment   Prognosis Good   Problem List Decreased strength;Decreased endurance;Decreased range of motion; Impaired balance;Decreased mobility; Decreased coordination;Decreased cognition; Impaired judgement;Decreased safety awareness;Pain;Orthopedic restrictions   Assessment The patient is demonstrating notable improvement today as she notes no pain in her foot today. Her CAM boot was donned without any pain, and she was readily able to transfer. The patient had improving gait as she became accostomed to the CAM boot, and she required no assistance throughout. She had no loss of balance even with dynamic head and trunk movements. She was able to transfer on and off the chair, bed, and toilet without assistance. The patient feels comfortable returning home as her pain was resolved today. Will continue to follow.    Barriers to Discharge None   Goals   Patient Goals To go home. STG Expiration Date 10/21/23   PT Treatment Day 1   Plan   Treatment/Interventions Functional transfer training;LE strengthening/ROM; Therapeutic exercise; Endurance training;Patient/family training;Bed mobility;Gait training;Elevations   Progress Progressing toward goals   PT Frequency 3-5x/wk   Discharge Recommendation   PT Discharge Recommendation Home with home health rehabilitation   Equipment Recommended [facility] Recommended Wheeled walker   AM-PAC Basic Mobility Inpatient   Turning in Flat Bed Without Bedrails 3   Lying on Back to Sitting on Edge of Flat Bed Without Bedrails 3   Moving Bed to Chair 3   Standing Up From Chair Using Arms 3   Walk in Room 3   Climb 3-5 Stairs With Railing 3   Basic Mobility Inpatient Raw Score 18   Basic Mobility Standardized Score 41.05   Highest Level Of Mobility   JH-HLM Goal 6: Walk 10 steps or more   JH-HLM Achieved 7: Walk 25 feet or more         An AM-PAC Basic Mobility raw score less than 16 suggests the patient may benefit from discharge to post-acute rehab services.     Stephan Batista, PTA

## 2023-10-11 VITALS
OXYGEN SATURATION: 95 % | HEART RATE: 66 BPM | SYSTOLIC BLOOD PRESSURE: 156 MMHG | DIASTOLIC BLOOD PRESSURE: 57 MMHG | RESPIRATION RATE: 16 BRPM | TEMPERATURE: 97.6 F

## 2023-10-11 PROBLEM — M79.671 FOOT PAIN, RIGHT: Status: ACTIVE | Noted: 2023-10-11

## 2023-10-11 LAB
ATRIAL RATE: 80 BPM
DME PARACHUTE DELIVERY DATE REQUESTED: NORMAL
DME PARACHUTE ITEM DESCRIPTION: NORMAL
DME PARACHUTE ORDER STATUS: NORMAL
DME PARACHUTE SUPPLIER NAME: NORMAL
DME PARACHUTE SUPPLIER PHONE: NORMAL
GLUCOSE SERPL-MCNC: 149 MG/DL (ref 65–140)
GLUCOSE SERPL-MCNC: 158 MG/DL (ref 65–140)
P AXIS: 36 DEGREES
PR INTERVAL: 220 MS
QRS AXIS: -2 DEGREES
QRSD INTERVAL: 112 MS
QT INTERVAL: 416 MS
QTC INTERVAL: 479 MS
T WAVE AXIS: 74 DEGREES
VENTRICULAR RATE: 80 BPM

## 2023-10-11 PROCEDURE — 82948 REAGENT STRIP/BLOOD GLUCOSE: CPT

## 2023-10-11 PROCEDURE — 93010 ELECTROCARDIOGRAM REPORT: CPT | Performed by: INTERNAL MEDICINE

## 2023-10-11 PROCEDURE — 93005 ELECTROCARDIOGRAM TRACING: CPT

## 2023-10-11 PROCEDURE — 99238 HOSP IP/OBS DSCHRG MGMT 30/<: CPT | Performed by: INTERNAL MEDICINE

## 2023-10-11 PROCEDURE — 97535 SELF CARE MNGMENT TRAINING: CPT

## 2023-10-11 RX ORDER — POLYETHYLENE GLYCOL 3350 17 G/17G
17 POWDER, FOR SOLUTION ORAL ONCE
Status: COMPLETED | OUTPATIENT
Start: 2023-10-11 | End: 2023-10-11

## 2023-10-11 RX ORDER — COLCHICINE 0.6 MG/1
0.6 TABLET ORAL 2 TIMES DAILY
Qty: 60 TABLET | Refills: 0 | Status: SHIPPED | OUTPATIENT
Start: 2023-10-11 | End: 2023-11-10

## 2023-10-11 RX ORDER — AMOXICILLIN 250 MG
1 CAPSULE ORAL
Status: DISCONTINUED | OUTPATIENT
Start: 2023-10-11 | End: 2023-10-11 | Stop reason: HOSPADM

## 2023-10-11 RX ADMIN — METOPROLOL SUCCINATE 200 MG: 100 TABLET, EXTENDED RELEASE ORAL at 08:35

## 2023-10-11 RX ADMIN — POLYETHYLENE GLYCOL 3350 17 G: 17 POWDER, FOR SOLUTION ORAL at 03:01

## 2023-10-11 RX ADMIN — INSULIN LISPRO 1 UNITS: 100 INJECTION, SOLUTION INTRAVENOUS; SUBCUTANEOUS at 08:35

## 2023-10-11 RX ADMIN — COLCHICINE 0.6 MG: 0.6 TABLET ORAL at 08:35

## 2023-10-11 RX ADMIN — ISOSORBIDE MONONITRATE 30 MG: 30 TABLET, EXTENDED RELEASE ORAL at 08:35

## 2023-10-11 RX ADMIN — AMLODIPINE BESYLATE 2.5 MG: 2.5 TABLET ORAL at 08:35

## 2023-10-11 RX ADMIN — HEPARIN SODIUM 5000 UNITS: 5000 INJECTION INTRAVENOUS; SUBCUTANEOUS at 07:26

## 2023-10-11 RX ADMIN — ALLOPURINOL 300 MG: 300 TABLET ORAL at 08:35

## 2023-10-11 RX ADMIN — FUROSEMIDE 40 MG: 40 TABLET ORAL at 08:35

## 2023-10-11 RX ADMIN — ASPIRIN 81 MG CHEWABLE TABLET 81 MG: 81 TABLET CHEWABLE at 08:35

## 2023-10-11 RX ADMIN — CLOPIDOGREL BISULFATE 75 MG: 75 TABLET, FILM COATED ORAL at 08:34

## 2023-10-11 NOTE — OCCUPATIONAL THERAPY NOTE
Occupational Therapy Progress Note     Patient Name: Deep Garcia  [id number] Date: 10/11/2023  Problem List  Principal Problem:    Ambulatory dysfunction  Active Problems:    Hyperlipidemia    Hypertension    CKD (chronic kidney disease) stage 3, GFR 30-59 ml/min (HCC)    Coronary artery disease    Atrial fibrillation (HCC)    Type 2 diabetes mellitus with chronic kidney disease, without long-term current use of insulin, unspecified CKD stage ([address])          10/11/23 0950   OT Last Visit   OT Visit Date 10/11/23   Note Type   Note Type Treatment   Pain Assessment   Pain Assessment Tool 0-10   Pain Score No Pain   Restrictions/Precautions   Weight Bearing Precautions Per Order No   (RLE CAM boot)   Other Precautions Fall Risk   ADL   Grooming Assistance 5  Supervision/Setup   Grooming Deficit Supervision/safety; Increased time to complete;Standing with assistive device   Grooming Comments washed hands, combed hair at sink side w/ no UE support. Stood at sink >6 minutes. LB Dressing Assistance 5  Supervision/Setup   LB Dressing Deficit Setup;Supervision/safety; Increased time to complete;Verbal cueing  (VCs to dress RLE first. Donned pants, CAM boot)   Toileting Assistance  5  Supervision/Setup   Toileting Deficit Supervison/safety; Increased time to complete   Toileting Comments VCs for hand placement. 1st time toileting with standard toilet, 2nd time with commode over toilet   Bed Mobility   Supine to Sit 5  Supervision   Additional items Increased time required   Sit to Supine 5  Supervision   Additional items Increased time required; Bedrails   Transfers   Sit to Stand 5  Supervision   Additional items Increased time required   Stand to Sit 5  Supervision   Additional items Increased time required   Toilet transfer 5  Supervision   Additional items Commode  (initial transfer with standard toilet and grab bar MIN A, 2nd transfer with commode over toilet SUP)   Additional Comments RW   Functional Mobility Functional Mobility 5  Supervision   Additional Comments w/in room and bathroom   Additional items Rolling walker   Cognition   Overall Cognitive Status WFL   Arousal/Participation Alert; Cooperative   Attention Within functional limits   Orientation Level Oriented X4   Memory Decreased short term memory   Following Commands Follows one step commands without difficulty   Activity Tolerance   Activity Tolerance Patient tolerated treatment well   Medical Staff Made Aware RN updated   Assessment   Assessment Patient participated in Skilled OT session this date with interventions consisting of ADL re-training, functional transfers/mobility, pt safety education, DME education. Patient agreeable to OT treatment session, upon arrival patient was found supine in bed. In comparison to previous session, patient with improvements in LB dressing, toileting, STS, functional mobility. Pt reporting no pain in RLE at rest. Pt requiring SUP for toileting and LB dressing tasks with RW. Pt educated to dress RLE first. Pt additionally educated on recommendation for [facility] and practiced transfer with SUP. Pt educated on recommendation for SC and how to obtain. Pt forgetful at times, requiring repeated directions, however demo Good safety awareness throughout. Patient continues to be functioning below baseline level, occupational performance remains limited secondary to factors listed above and increased risk for falls and injury. From OT standpoint, recommendation at time of d/c would be HOME OT/HOME WITH FAMILY SUPPORT. Patient to benefit from continued Occupational Therapy treatment while in the hospital to address deficits as defined above and maximize level of functional independence with ADLs and functional mobility. Plan   Treatment Interventions ADL retraining;Functional transfer training;Cognitive reorientation;Patient/family training;Equipment evaluation/education; Compensatory technique education; Activityengagement   Goal Expiration Date 10/23/23   OT Treatment Day 1   OT Frequency 2-3x/wk   Discharge Recommendation   OT Discharge Recommendation Home with home health rehabilitation   Equipment Recommended (S)  Bedside commode; Shower/Tub chair with back ($)   Commode Type Standard   AM-PAC Daily Activity Inpatient   Lower Body Dressing 3   Bathing 3   Toileting 3   Upper Body Dressing 3   Grooming 4   Eating 4   Daily Activity Raw Score 20   Daily Activity Standardized Score (Calc for Raw Score >=11) 42.03   AM-PAC Applied Cognition Inpatient   Following a Speech/Presentation 4   Understanding Ordinary Conversation 4   Taking Medications 4   Remembering Where Things Are Placed or Put Away 3   Remembering List of 4-5 Errands 3   Taking Care of Complicated Tasks 3   Applied Cognition Raw Score 21   Applied Cognition Standardized Score 44.3   End of Consult   Patient Position at End of Consult Supine; All needs within reach   Nurse Communication Nurse aware of consult           The patient's raw score on the AM-PAC Daily Activity Inpatient Short Form is 20. A raw score of greater than or equal to 19 suggests the patient may benefit from discharge to home. Please refer to the recommendation of the Occupational Therapist for safe discharge planning.           Nellie Raza, OTR/L

## 2023-10-11 NOTE — DISCHARGE SUMMARY
INTERNAL MEDICINE RESIDENCY DISCHARGE SUMMARY     Gemini Rob   79 y.o. female  MRN: [id number]  Room/Bed: /-0     [address]   Encounter: [id number]    Principal Problem:    Ambulatory dysfunction  Active Problems:    Hyperlipidemia    Hypertension    CKD (chronic kidney disease) stage 3, GFR 30-59 ml/min ([facility])    Coronary artery disease    Atrial fibrillation (HCC)    Type 2 diabetes mellitus with chronic kidney disease, without long-term current use of insulin, unspecified CKD stage ([address])      Type 2 diabetes mellitus with chronic kidney disease, without long-term current use of insulin, unspecified CKD stage (HCC)  Assessment & Plan  Lab Results   Component Value Date    HGBA1C 6.7 (H) 10/03/2023       No results for input(s): "POCGLU" in the last 72 hours. Plan:  - Holding home med: metFORMIN (GLUCOPHAGE) 500 mg tablet daily  - Start SSI algorithm 3    Atrial fibrillation [facility])  Assessment & Plan  In January 2017 went from sinus emerita to NSR to Afib RVR (after surgery). CHADSVASC score is 7 > 11.2% risk    Plan:  - Continue home plavix, toprol 200 mg, lipitor, and aspirin  - Outpatient cardiology follow up     Coronary artery disease  Assessment & Plan  History of CAD s/p CABG '96, PCI/stent '12, 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 Currently taking 81 mg aspirin QD and 75 mg plavix QD and isosorbide mononitrate (IMDUR) 30 mg 24 hr tablet daily at home. Plan:  - Continue aspirin, plavix, lipitor  - Continue imdur      CKD (chronic kidney disease) stage 3, GFR 30-59 ml/min ([facility])  Assessment & Plan  Wt Readings from Last 3 Encounters:   10/05/23 80.8 kg (178 lb 2.1 oz)   05/22/23 83.3 kg (183 lb 9.6 oz)   01/20/23 82.6 kg (182 lb)     Recent admission for CHF exacerbation on 10/3. Most recent echo showed moderate to severe asymmetric hypertrophy of the septal wall. The left ventricular ejection fraction is 55%.  Systolic function is normal. Diastolic function is moderately abnormal, consistent with grade II (pseudonormal) relaxation. The following segments are akinetic: basal inferior and mid inferolateral.    Plan:  - Continue home  furosemide (LASIX) 40 mg tablet daily  - Monitor daily weights and I/Os  - Cardiac diet    Hypertension  Assessment & Plan  History of hypertension, home meds include amlodipine 2.5 QD and toprol  mg QD    Plan:  - Continue home meds amlodipine 2.5 mg QD and toprol  mg QD    Hyperlipidemia  Assessment & Plan  Last lipid panel in 2020 remarkable for slightly elevated triglycerides. Currently takes 40 mg lipitor QD at home  Plan:  - Continue home Lipitor     * Ambulatory dysfunction  Assessment & Plan  77-year-old female with a h/o CAD with CABG in 1996 s/p stent placement, HTN, hyperlipidemia, CHF, T2DM, bilateral carotid artery stenosis s/p R CEA and R innominate stent placement, and CKD stage III who presented via EMS on 10/9 with c/o R ankle pain and inability to bear weight on her R foot. She was recently admitted to the on 10/3/23 for CHF exacerbation. On her last day in the hospital on 10/5/23, she states that her ankle was accidentally injured prior to her PT evaluation. When the blankets were being removed, they wrapped around her foot and pulled her ankle. She was able to walk following the incident but reported pain at this time. After being discharged home, she was barely able to ambulate up the stairs due to the pain, requiring her son's help. Since 10/5, she says she has not ambulated and resorted to urinating in bed. At this time she has 0/10 pain while at rest. Reports significant pain with movement and contact. XR R foot and tibia fibula show no evidence of fracture. Colchicine started yesterday and with significant improvement in pain and ROM, patient may have gout causing ambulatory issues.      Plan:  - Will cont colchicine 0.6mg BID outpatient until gout has resolved    - PRN tylenol for pain  - PT recommends home with home PT    Physical Exam  Vitals and nursing note reviewed. Constitutional:       General: She is not in acute distress. Appearance: She is well-developed. HENT:      Head: Normocephalic and atraumatic. Eyes:      Conjunctiva/sclera: Conjunctivae normal.   Cardiovascular:      Rate and Rhythm: Normal rate and regular rhythm. Heart sounds: No murmur heard. Pulmonary:      Effort: Pulmonary effort is normal. No respiratory distress. Breath sounds: Normal breath sounds. Abdominal:      Palpations: Abdomen is soft. Tenderness: There is no abdominal tenderness. Musculoskeletal:         General: No swelling. Cervical back: Neck supple. Skin:     General: Skin is warm and dry. Capillary Refill: Capillary refill takes less than 2 seconds. Neurological:      Mental Status: She is alert. Psychiatric:         Mood and Affect: Mood normal.           [address]     Per Dr. Randy Healy "Patient is a 26-year-old female with a h/o CAD with CABG in 1996 s/p stent placement, HTN, hyperlipidemia, CHF, T2DM, bilateral carotid artery stenosis s/p R CEA and R innominate stent placement, and CKD stage III who presented via EMS on 10/9 with c/o R ankle pain and inability to bear weight on her R foot. She was recently admitted to the on 10/3/23 for CHF exacerbation. On her last day in the hospital on 10/5/23, she states that her ankle was accidentally injured prior to her PT evaluation. When the blankets were being removed, they wrapped around her foot and pulled her ankle. She was able to walk following the incident but reported pain at this time. After being discharged home, she was barely able to ambulate up the stairs due to the pain, requiring her son's help. Since 10/5, she says she has not ambulated and resorted to urinating in bed.  At this time she has 0/10 pain while at rest. Reports significant pain with movement and contact." Patient was [facility] to [facility] for further management. She was started on colchicine and noted significant improvement in her pain. Physical therapy initially recommended postacute rehab however after initiating colchicine they were able to note significant improvement in her ambulation and are now recommending home with home PT. Patient is amenable to going home today and will be discharged with a walker. DISCHARGE INFORMATION     PCP at Discharge: Pollo Andrews MD    Admitting Provider: Pollo Andrews MD  Admission Date: 10/9/2023    Discharge Provider: Pollo Andrews MD  Discharge Date: 10/11/23    Discharge Disposition: Home with [address] [facility]  Discharge Condition: good  Discharge with Lines: no    Discharge Diet: regular diet  Activity Restrictions: none  Test Results Pending at Discharge: none    Discharge Diagnoses:  Principal Problem:    Ambulatory dysfunction  Active Problems:    Hyperlipidemia    Hypertension    CKD (chronic kidney disease) stage 3, GFR 30-59 ml/min ([facility])    Coronary artery disease    Atrial fibrillation ([address])    Type 2 diabetes mellitus with chronic kidney disease, without long-term current use of insulin, unspecified CKD stage ([address])  Resolved Problems:    * No resolved hospital problems. *      Consulting Providers:      Diagnostic & Therapeutic Procedures Performed:  XR chest portable    Result Date: 10/9/2023  Impression: Persistent borderline cardiomegaly with decreased perihilar congestive changes compared to 10/3/2023. There is no lobar consolidation or pleural effusion. Workstation performed: [id number]     XR foot 3+ views RIGHT    Result Date: 10/9/2023  Impression: No acute osseous abnormality. Workstation performed: [id number]     XR tibia fibula 2 views RIGHT    Result Date: 10/9/2023  Impression: No acute osseous abnormality.  Workstation performed: [id number]       Code Status: Level 1 - Full Code  Advance Directive & Living Will: <no information>  Power of :    POLST:      Medications:  Current Discharge Medication List        Current Discharge Medication List        Current Discharge Medication List        CONTINUE these medications which have NOT CHANGED    Details   allopurinol (ZYLOPRIM) 300 mg tablet Take 1 tablet (300 mg total) by mouth daily  Qty: 90 tablet, Refills: 3    Associated Diagnoses: Gout due to renal impairment, unspecified chronicity, unspecified site      amLODIPine (NORVASC) 2.5 mg tablet Take 1 tablet (2.5 mg total) by mouth daily  Qty: 90 tablet, Refills: 1    Associated Diagnoses: Essential hypertension      aspirin 81 mg chewable tablet Chew 1 tablet daily for 30 days  Qty: 30 tablet, Refills: 0      atorvastatin (LIPITOR) 40 mg tablet Take 1 tablet (40 mg total) by mouth daily Do not start before October 6, 2023. Qty: 30 tablet, Refills: 0    Associated Diagnoses: Coronary artery disease, unspecified vessel or lesion type, unspecified whether angina present, unspecified whether native or transplanted heart; Hyperlipidemia, unspecified hyperlipidemia type      clopidogrel (PLAVIX) 75 mg tablet Take 1 tablet (75 mg total) by mouth daily  Qty: 90 tablet, Refills: 3    Associated Diagnoses: Coronary artery disease of native heart with stable angina pectoris, unspecified vessel or lesion type (HCC)      furosemide (LASIX) 40 mg tablet Take 1 tablet (40 mg total) by mouth daily  Qty: 30 tablet, Refills: 0    Associated Diagnoses: CHF exacerbation (HCC)      isosorbide mononitrate (IMDUR) 30 mg 24 hr tablet Take 1 tablet (30 mg total) by mouth daily Do not start before October 6, 2023.   Qty: 30 tablet, Refills: 0    Associated Diagnoses: Coronary artery disease, unspecified vessel or lesion type, unspecified whether angina present, unspecified whether native or transplanted heart; Demand ischemia of myocardium      metFORMIN (GLUCOPHAGE) 500 mg tablet Take 1 tablet (500 mg total) by mouth daily  Qty: 90 tablet, Refills: 3    Associated Diagnoses: Type 2 diabetes mellitus without complication, with long-term current use of insulin (HCC)      metoprolol succinate (TOPROL-XL) 200 MG 24 hr tablet Take 1 tablet (200 mg total) by mouth daily  Qty: 90 tablet, Refills: 1    Associated Diagnoses: Essential hypertension      nitroglycerin (NITROSTAT) 0.4 mg SL tablet Place 0.4 mg under the tongue every 5 (five) minutes as needed for chest pain             Allergies: Allergies   Allergen Reactions   • Medical Tape    • Other Rash     Pt states she is allergic to tape adhesive       FOLLOW-UP     PCP Outpatient Follow-up:  yes      Follow up: Sal in one week     Consulting Providers Follow-up:  none required     Active Issues Requiring Follow-up:   none    Discharge Statement:   I spent 30 minutes minutes discharging the patient. This time was spent on the day of discharge. I had direct contact with the patient on the day of discharge. Additional documentation is required if more than 30 minutes were spent on discharge. Portions of the record may have been created with voice recognition software. Occasional wrong word or "sound a like" substitutions may have occurred due to the inherent limitations of voice recognition software.   Read the chart carefully and recognize, using context, where substitutions have occurred.    ==  Prince Tierney MD  [facility]  Internal Medicine Resident PGY-2

## 2023-10-11 NOTE — UTILIZATION REVIEW
NOTIFICATION OF INPATIENT ADMISSION   AUTHORIZATION REQUEST   SERVICING FACILITY:   [address] [place] [address]  Address: [address] [place] [address]  Tax ID: [id number]  NPI: [id number] ATTENDING PROVIDER:  Attending Name and NPI#: Kd Alva Md [[id number]]  Address: [address] [place] [address]  Phone: [phone]   ADMISSION INFORMATION:  [facility]   Place of Service Code: 21  Inpatient Admission Date/Time: 10/9/23  5:26 AM  Discharge Date/Time: No discharge date for patient encounter. Admitting Diagnosis Code/Description:  Foot pain [M79.673]  Foot pain, right [M79.671]  Ambulatory dysfunction [R26.2]     UTILIZATION REVIEW CONTACT:  Miguel Ellis, Utilization   Network Utilization Review Department  Phone: [phone]  Fax: [phone]  Email: [email or web address]  Contact for approvals/pending authorizations, clinical reviews, and discharge. PHYSICIAN ADVISORY SERVICES:  Medical Necessity Denial & [id number] Review  Phone: [phone]  Fax: [phone]  Email: [email or web address]     DISCHARGE SUPPORT TEAM:  For Patients Discharge Needs & Updates  Phone: [phone] opt. 2 Fax: [phone]  Email: [email or web address]

## 2023-10-11 NOTE — PLAN OF CARE
Problem: MOBILITY - ADULT  Goal: Maintain or return to baseline ADL function  Description: INTERVENTIONS:  -  Assess patient's ability to carry out ADLs; assess patient's baseline for ADL function and identify physical deficits which impact ability to perform ADLs (bathing, care of mouth/teeth, toileting, grooming, dressing, etc.)  - Assess/evaluate cause of self-care deficits   - Assess range of motion  - Assess patient's mobility; develop plan if impaired  - Assess patient's need for assistive devices and provide as appropriate  - Encourage maximum independence but intervene and supervise when necessary  - Involve family in performance of ADLs  - Assess for home care needs following discharge   - Consider OT consult to assist with ADL evaluation and planning for discharge  - Provide patient education as appropriate  Outcome: Adequate for Discharge  Goal: Maintains/Returns to pre admission functional level  Description: INTERVENTIONS:  - Perform BMAT or MOVE assessment daily.   - Set and communicate daily mobility goal to care team and patient/family/caregiver.    - Collaborate with rehabilitation services on mobility goals if consulted  - Out of bed for toileting  - Record patient progress and toleration of activity level   Outcome: Adequate for Discharge     Problem: Prexisting or High Potential for Compromised Skin Integrity  Goal: Skin integrity is maintained or improved  Description: INTERVENTIONS:  - Identify patients at risk for skin breakdown  - Assess and monitor skin integrity  - Assess and monitor nutrition and hydration status  - Monitor labs   - Assess for incontinence   - Turn and reposition patient  - Assist with mobility/ambulation  - Relieve pressure over bony prominences  - Avoid friction and shearing  - Provide appropriate hygiene as needed including keeping skin clean and dry  - Evaluate need for skin moisturizer/barrier cream  - Collaborate with interdisciplinary team   - Patient/family teaching  - Consider wound care consult   Outcome: Adequate for Discharge     Problem: PAIN - ADULT  Goal: Verbalizes/displays adequate comfort level or baseline comfort level  Description: Interventions:  - Encourage patient to monitor pain and request assistance  - Assess pain using appropriate pain scale  - Administer analgesics based on type and severity of pain and evaluate response  - Implement non-pharmacological measures as appropriate and evaluate response  - Consider cultural and social influences on pain and pain management  - Notify physician/advanced practitioner if interventions unsuccessful or patient reports new pain  Outcome: Adequate for Discharge     Problem: INFECTION - ADULT  Goal: Absence or prevention of progression during hospitalization  Description: INTERVENTIONS:  - Assess and monitor for signs and symptoms of infection  - Monitor lab/diagnostic results  - Monitor all insertion sites, i.e. indwelling lines, tubes, and drains  - Monitor endotracheal if appropriate and nasal secretions for changes in amount and color  - Foster appropriate cooling/warming therapies per order  - Administer medications as ordered  - Instruct and encourage patient and family to use good hand hygiene technique  - Identify and instruct in appropriate isolation precautions for identified infection/condition  Outcome: Adequate for Discharge  Goal: Absence of fever/infection during neutropenic period  Description: INTERVENTIONS:  - Monitor WBC    Outcome: Adequate for Discharge     Problem: SAFETY ADULT  Goal: Maintain or return to baseline ADL function  Description: INTERVENTIONS:  -  Assess patient's ability to carry out ADLs; assess patient's baseline for ADL function and identify physical deficits which impact ability to perform ADLs (bathing, care of mouth/teeth, toileting, grooming, dressing, etc.)  - Assess/evaluate cause of self-care deficits   - Assess range of motion  - Assess patient's mobility; develop plan if impaired  - Assess patient's need for assistive devices and provide as appropriate  - Encourage maximum independence but intervene and supervise when necessary  - Involve family in performance of ADLs  - Assess for home care needs following discharge   - Consider OT consult to assist with ADL evaluation and planning for discharge  - Provide patient education as appropriate  Outcome: Adequate for Discharge  Goal: Maintains/Returns to pre admission functional level  Description: INTERVENTIONS:  - Perform BMAT or MOVE assessment daily.   - Set and communicate daily mobility goal to care team and patient/family/caregiver.    - Collaborate with rehabilitation services on mobility goals if consulted  - Out of bed for toileting  - Record patient progress and toleration of activity level   Outcome: Adequate for Discharge  Goal: Patient will remain free of falls  Description: INTERVENTIONS:  - Educate patient/family on patient safety including physical limitations  - Instruct patient to call for assistance with activity   - Consult OT/PT to assist with strengthening/mobility   - Keep Call bell within reach  - Keep bed low and locked with side rails adjusted as appropriate  - Keep care items and personal belongings within reach  - Initiate and maintain comfort rounds  - Make Fall Risk Sign visible to staff  - Apply yellow socks and bracelet for high fall risk patients  - Consider moving patient to room near nurses station  Outcome: Adequate for Discharge     Problem: DISCHARGE PLANNING  Goal: Discharge to home or other facility with appropriate resources  Description: INTERVENTIONS:  - Identify barriers to discharge w/patient and caregiver  - Arrange for needed discharge resources and transportation as appropriate  - Identify discharge learning needs (meds, wound care, etc.)  - Arrange for interpretive services to assist at discharge as needed  - Refer to Case Management Department for coordinating discharge planning if the patient needs post-hospital services based on physician/advanced practitioner order or complex needs related to functional status, cognitive ability, or social support system  Outcome: Adequate for Discharge     Problem: Knowledge Deficit  Goal: Patient/family/caregiver demonstrates understanding of disease process, treatment plan, medications, and discharge instructions  Description: Complete learning assessment and assess knowledge base.   Interventions:  - Provide teaching at level of understanding  - Provide teaching via preferred learning methods  Outcome: Adequate for Discharge

## 2023-10-11 NOTE — CASE MANAGEMENT
Case Management Discharge Planning Note    Patient name Kwabena Do  Location /-18 MRN [id number]  : 1940 Date 10/11/2023       Current Admission Date: 10/9/2023  Current Admission Diagnosis:Ambulatory dysfunction   Patient Active Problem List    Diagnosis Date Noted    Foot pain, right 10/11/2023    Ambulatory dysfunction 10/09/2023    Elevated troponin 10/03/2023    CHF exacerbation ([address]) 10/03/2023    Respiratory insufficiency 10/03/2023    Type 2 diabetes mellitus with chronic kidney disease, without long-term current use of insulin, unspecified CKD stage ([address]) 2022    Intermittent claudication ([address]) 2019    Atrial fibrillation ([address]) 2017    Non-ST elevation myocardial infarction (NSTEMI), type 2 2017    Hyperlipidemia     Hypertension     CKD (chronic kidney disease) stage 3, GFR 30-59 ml/min ([facility])     Coronary artery disease     Demand ischemia of myocardium 2017    Asymptomatic bilateral carotid artery stenosis 2017    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[address]) 2017    Cataract of right eye 10/25/2016    Cataract, left eye 10/18/2016      LOS (days): 2  Geometric Mean LOS (GMLOS) (days): 2.80  Days to GMLOS:0.6     OBJECTIVE:  Risk of Unplanned Readmission Score: 16.14         Current admission status: Inpatient   Preferred Pharmacy:   [address] [place] [address] [address] [address]  Phone: [phone] Fax: [phone]    Primary Care Provider: Cato Nissen, MD    Primary Insurance: Corrie Matilde [facility]  Secondary Insurance:     DISCHARGE DETAILS:    Discharge planning discussed with[de-identified] Patient  Freedom of Choice: Yes  Comments - Freedom of Choice: Pratik HERNÁNDEZ following  CM contacted family/caregiver?: No- see comments  Were Treatment Team discharge recommendations reviewed with patient/caregiver?: Yes  Did patient/caregiver verbalize understanding of patient care needs?: Yes  Were patient/caregiver advised of the risks associated with not following Treatment Team discharge recommendations?: Yes         [address]         Is the patient interested in [address] [place] [address] at discharge?: Yes  [facility] requested[de-identified] Nursing, Physical [address] [facility] Name[de-identified] Olga Provider[de-identified] PCP  Home Health Services Needed[de-identified] Evaluate Functional Status and Safety  Homebound Criteria Met[de-identified] Requires the Assistance of Another Person for Safe Ambulation or to Leave the Home, Uses an Assist Device (i.e. cane, walker, etc)  Supporting Clincal Findings[de-identified] Limited Endurance    DME Referral Provided  Referral made for DME?: Yes  DME referral completed for the following items[de-identified] Clarissa [place]  DME Supplier Name[de-identified] [employer]    Other Referral/Resources/Interventions Provided:  Interventions: [address] [place] [address]         Treatment Team Recommendation: Home with [address]   Discharge Destination Plan[de-identified] Home with [address] [facility] at Discharge : Family

## 2023-10-11 NOTE — PLAN OF CARE
Problem: OCCUPATIONAL THERAPY ADULT  Goal: Performs self-care activities at highest level of function for planned discharge setting. See evaluation for individualized goals. Description: Treatment Interventions: ADL retraining, Functional transfer training, Endurance training, Patient/family training, Equipment evaluation/education, Compensatory technique education, Activityengagement          See flowsheet documentation for full assessment, interventions and recommendations. Outcome: Progressing  Note: Limitation: Decreased ADL status, Decreased endurance, Decreased self-care trans, Decreased high-level ADLs  Prognosis: Good  Assessment: Patient participated in Skilled OT session this date with interventions consisting of ADL re-training, functional transfers/mobility, pt safety education, DME education. Patient agreeable to OT treatment session, upon arrival patient was found supine in bed. In comparison to previous session, patient with improvements in LB dressing, toileting, STS, functional mobility. Pt reporting no pain in RLE at rest. Pt requiring SUP for toileting and LB dressing tasks with RW. Pt educated to dress RLE first. Pt additionally educated on recommendation for [facility] and practiced transfer with SUP. Pt educated on recommendation for [facility] and how to obtain. Pt forgetful at times, requiring repeated directions, however demo Good safety awareness throughout. Patient continues to be functioning below baseline level, occupational performance remains limited secondary to factors listed above and increased risk for falls and injury. From OT standpoint, recommendation at time of d/c would be HOME OT/HOME WITH FAMILY SUPPORT. Patient to benefit from continued Occupational Therapy treatment while in the hospital to address deficits as defined above and maximize level of functional independence with ADLs and functional mobility.      OT Discharge Recommendation: Home with home health rehabilitation

## 2023-10-12 ENCOUNTER — TRANSITIONAL CARE MANAGEMENT (OUTPATIENT)
Dept: INTERNAL MEDICINE CLINIC | Age: 83
End: 2023-10-12

## 2023-10-12 ENCOUNTER — HOME HEALTH ADMISSION (OUTPATIENT)
Dept: HOME HEALTH SERVICES | Facility: HOME HEALTHCARE | Age: 83
End: 2023-10-12

## 2023-10-12 ENCOUNTER — TELEPHONE (OUTPATIENT)
Dept: INTERNAL MEDICINE CLINIC | Age: 83
End: 2023-10-12

## 2023-10-12 DIAGNOSIS — Z71.89 COMPLEX CARE COORDINATION: Primary | ICD-10-CM

## 2023-10-12 NOTE — TELEPHONE ENCOUNTER
LM for both pt and son. I scheduled an appt for pt on 10/19 at 130 PM with Dr Tami Cartagena. Need to ask if this appt works for patient. If not, need to reschedule and cancel.   Please schedule TCM on or before 10/25

## 2023-10-13 ENCOUNTER — PATIENT OUTREACH (OUTPATIENT)
Dept: INTERNAL MEDICINE CLINIC | Facility: CLINIC | Age: 83
End: 2023-10-13

## 2023-10-13 NOTE — PROGRESS NOTES
Spoke wit [facility] they will calling Katie Vernon to set up PT. I spoke with Katie Vernon she is aware [facility] will be calling to set up PT.

## 2023-10-13 NOTE — PROGRESS NOTES
Curt Elias returned my phone call. She is using walker to ambulate. Son is very helpful . Curt Elias had just gotten off the phone with PCP office and they reviewed all of her medications. Her scale is upstairs she will ask her son to bring it downstairs to her so she can weigh daily. Her ankle feels much better , feels she is moving around better. Watching salt intake. To see PCP on 10/19/23 I will follow up after appointment.  Mattsheldon Elias is not sure which home health agency is going to be working with her I will find out and let her know

## 2023-10-14 ENCOUNTER — HOME CARE VISIT (OUTPATIENT)
Dept: HOME HEALTH SERVICES | Facility: HOME HEALTHCARE | Age: 83
End: 2023-10-14

## 2023-10-14 LAB
BACTERIA BLD CULT: NORMAL
BACTERIA BLD CULT: NORMAL

## 2023-10-16 ENCOUNTER — PATIENT OUTREACH (OUTPATIENT)
Dept: INTERNAL MEDICINE CLINIC | Facility: CLINIC | Age: 83
End: 2023-10-16

## 2023-10-16 NOTE — PROGRESS NOTES
True Maroon called to see if her appointment can be later on Thursday 10/19/23 her son works until 130pm would need later time. Explained I would need to check with PCP office about the appointment time   Also states legs feel cold when she touches them, legs feel like they are falling asleep.  Denies any swelling in her legs and no pain

## 2023-10-18 ENCOUNTER — HOME CARE VISIT (OUTPATIENT)
Dept: HOME HEALTH SERVICES | Facility: HOME HEALTHCARE | Age: 83
End: 2023-10-18

## 2023-10-19 ENCOUNTER — OFFICE VISIT (OUTPATIENT)
Dept: INTERNAL MEDICINE CLINIC | Facility: CLINIC | Age: 83
End: 2023-10-19

## 2023-10-19 VITALS
BODY MASS INDEX: 30.48 KG/M2 | SYSTOLIC BLOOD PRESSURE: 131 MMHG | WEIGHT: 172 LBS | HEIGHT: 63 IN | TEMPERATURE: 98 F | HEART RATE: 61 BPM | DIASTOLIC BLOOD PRESSURE: 91 MMHG

## 2023-10-19 DIAGNOSIS — M79.671 FOOT PAIN, RIGHT: ICD-10-CM

## 2023-10-19 NOTE — PROGRESS NOTES
TCM Call       Date and time call was made  10/12/2023 10:38 AM    Hospital care reviewed  Records reviewed    Patient was hospitialized at  [facility]    Date of Admission  10/09/23    Date of discharge  10/11/23    Diagnosis  ambulatory dysfunction    Disposition  Home    Current Symptoms  Loose Stools          TCM Call       Post hospital issues  None    Scheduled for follow up? Yes    Did you obtain your prescribed medications  Yes    Do you need help managing your prescriptions or medications  No    Is transportation to your appointment needed  Yes    Specify why  son    I have advised the patient to call PCP with any new or worsening symptoms  Yoana Peralta CMA           Assessment/Plan:    TCM Call       Date and time call was made  10/12/2023 10:38 AM    Hospital care reviewed  Records reviewed    Patient was hospitialized at  [facility]    Date of Admission  10/09/23    Date of discharge  10/11/23    Diagnosis  ambulatory dysfunction    Disposition  Home    Current Symptoms  Loose Stools          TCM Call       Post hospital issues  None    Scheduled for follow up? Yes    Did you obtain your prescribed medications  Yes    Do you need help managing your prescriptions or medications  No    Is transportation to your appointment needed  Yes    Specify why  son    I have advised the patient to call PCP with any new or worsening symptoms  Yoana Peralta CMA              TCM visit  after  hospitalization  from  10/09/23 to 10/11 /23  for  treatment of  shortness of  breath  and    gout  . Doing  well  post  discharge  homr . Subjective: Mild  dyspnea  on exertion. Patient ID: Dima Mccoy is a 79 y.o. female.     HPI    The following portions of the patient's history were reviewed and updated as appropriate: allergies, current medications, past family history, past medical history, past social history, past surgical history, and problem list.    Review of Systems Constitutional: Negative. HENT:  Negative for dental problem, drooling, ear discharge and ear pain. Eyes:  Negative for discharge, redness and itching. Respiratory:  Positive for shortness of breath. Negative for apnea, cough and wheezing. Cardiovascular:  Negative for chest pain and palpitations. Gastrointestinal:  Negative for abdominal pain, blood in stool, diarrhea and vomiting. Endocrine: Negative for polydipsia, polyphagia and polyuria. Genitourinary:  Negative for decreased urine volume, dysuria and frequency. Musculoskeletal:  Negative for arthralgias, myalgias and neck stiffness. Skin:  Negative for pallor and wound. Allergic/Immunologic: Negative for environmental allergies and food allergies. Neurological:  Negative for facial asymmetry, light-headedness, numbness and headaches. Hematological:  Negative for adenopathy. Does not bruise/bleed easily. Psychiatric/Behavioral:  Negative for agitation, behavioral problems and confusion. Objective: There were no vitals taken for this visit. Physical Exam  Constitutional:       Appearance: Normal appearance. HENT:      Head: Normocephalic. Nose: Nose normal.      Mouth/Throat:      Mouth: Mucous membranes are moist.   Eyes:      Pupils: Pupils are equal, round, and reactive to light. Cardiovascular:      Rate and Rhythm: Regular rhythm. Heart sounds: Normal heart sounds. Pulmonary:      Breath sounds: Normal breath sounds. Abdominal:      Palpations: Abdomen is soft. Musculoskeletal:         General: No swelling. Cervical back: Neck supple. Skin:     General: Skin is warm. Neurological:      General: No focal deficit present. Mental Status: She is alert and oriented to person, place, and time.    Psychiatric:         Mood and Affect: Mood normal.       Hypertension     BP  120/80   well controlled with Amlodipine     Hyperlipidemia    controlled  with   Statin      Diabetes  on Metformen    CAD  stable  on   Metoprolol  and  Isosorbide dinitrate  and  Lasix        Fup   4  months.         Nargis Leblanc MD.FACP

## 2023-10-20 ENCOUNTER — PATIENT OUTREACH (OUTPATIENT)
Dept: INTERNAL MEDICINE CLINIC | Facility: CLINIC | Age: 83
End: 2023-10-20

## 2023-10-20 NOTE — PROGRESS NOTES
Spoke with True Wiggins I woke her up. Saw PCP yesterday has stopped colchine due to diarrhea. Ordered new scale that should arrive today. Weight yesterday was 172 pounds at doctors office. She did walk in her foot yesterday and is no able to get into her bed upstairs. I will mail her information for low salt diet. I will check back in 1-2 weeks.

## 2023-10-24 ENCOUNTER — PATIENT OUTREACH (OUTPATIENT)
Dept: INTERNAL MEDICINE CLINIC | Facility: CLINIC | Age: 83
End: 2023-10-24

## 2023-11-07 ENCOUNTER — PATIENT OUTREACH (OUTPATIENT)
Dept: INTERNAL MEDICINE CLINIC | Facility: CLINIC | Age: 83
End: 2023-11-07

## 2023-11-07 NOTE — PROGRESS NOTES
Shahida Pedraza returned my phone call. Received the low salt information I sent her. Trying to stick to good choices. Denies lower extremity swelling. Did not weigh today but did get a new scaled. Not using walker anymore. Feels like she might start driving soon I will check back in two weeks.  Reminded her to call doctor if gains 3 pounds in one day or 5 pounds in one week

## 2023-11-21 ENCOUNTER — PATIENT OUTREACH (OUTPATIENT)
Dept: INTERNAL MEDICINE CLINIC | Facility: CLINIC | Age: 83
End: 2023-11-21

## 2023-11-22 ENCOUNTER — PATIENT OUTREACH (OUTPATIENT)
Dept: INTERNAL MEDICINE CLINIC | Facility: CLINIC | Age: 83
End: 2023-11-22

## 2023-11-22 NOTE — PROGRESS NOTES
Voice message left from Vanessa Preciado that Jim Sorto passed away on Monday 11/13/23. No other information left.

## 2023-12-31 NOTE — TELEPHONE ENCOUNTER
Influenza Vaccination LM for patient to call the office to schedule an OV to review her CV done in February with Dr Minnie Mckeon

## (undated) DEVICE — 3M™ IOBAN™ 2 ANTIMICROBIAL INCISE DRAPE 6640EZ: Brand: IOBAN™ 2

## (undated) DEVICE — THYROID SHEET: Brand: CONVERTORS

## (undated) DEVICE — SURGICEL FIBRILLAR 1 X 2

## (undated) DEVICE — FLEXOR, CHECK-FLO, INTRODUCER RAABE MODIFICATION: Brand: FLEXOR

## (undated) DEVICE — STERILE ICS CARDIOVASCULAR PK: Brand: CARDINAL HEALTH

## (undated) DEVICE — STOPCOCK 4 WAY LL HIGH PRESSURE

## (undated) DEVICE — DRAPE SURGIKIT SADDLE BAG

## (undated) DEVICE — ADHESIVE SKN CLSR HISTOACRYL FLEX 0.5ML LF

## (undated) DEVICE — SNAP KOVER: Brand: UNBRANDED

## (undated) DEVICE — SUT MONOCRYL 3-0 SH 27 IN Y416H

## (undated) DEVICE — PACK CAROTID PBDS

## (undated) DEVICE — Device

## (undated) DEVICE — NEEDLE 25G X 1 1/2

## (undated) DEVICE — CAROTID ARTERY SHUNT KIT,RADIOPAQUE LINE, STRAIGHT: Brand: ARGYLE

## (undated) DEVICE — ROSEN CURVED WIRE GUIDE: Brand: ROSEN

## (undated) DEVICE — POWERFLEXPRO 5MM2CM 80: Brand: POWERFLEX

## (undated) DEVICE — MICROPUNCTURE 501

## (undated) DEVICE — SYRINGE 1ML SLIP TIP

## (undated) DEVICE — SUT SILK 3-0 18 IN A184H

## (undated) DEVICE — REM POLYHESIVE ADULT PATIENT RETURN ELECTRODE: Brand: VALLEYLAB

## (undated) DEVICE — GLOVE SRG BIOGEL ORTHOPEDIC 7.5

## (undated) DEVICE — SUT SILK 2-0 18 IN A185H

## (undated) DEVICE — ANGIOGRAPHIC CATHETER: Brand: IMAGER™ II

## (undated) DEVICE — PROXIMATE PLUS MD MULTI-DIRECTIONAL RELEASE SKIN STAPLERS CONTAINS 35 STAINLESS STEEL STAPLES APPROXIMATE CLOSED DIMENSIONS: 6.9MM X 3.9MM WIDE: Brand: PROXIMATE

## (undated) DEVICE — LIGACLIP MCA MULTIPLE CLIP APPLIERS, 20 MEDIUM CLIPS: Brand: LIGACLIP

## (undated) DEVICE — 3000CC GUARDIAN II: Brand: GUARDIAN

## (undated) DEVICE — FLUID MANAGEMENT KIT - IR

## (undated) DEVICE — DRAPE PROBE NEO-PROBE/ULTRASOUND

## (undated) DEVICE — CHLORAPREP HI-LITE 26ML ORANGE

## (undated) DEVICE — GLOVE INDICATOR PI UNDERGLOVE SZ 8 BLUE

## (undated) DEVICE — INTENDED FOR TISSUE SEPARATION, AND OTHER PROCEDURES THAT REQUIRE A SHARP SURGICAL BLADE TO PUNCTURE OR CUT.: Brand: BARD-PARKER SAFETY BLADES SIZE 15, STERILE

## (undated) DEVICE — INTENDED FOR TISSUE SEPARATION, AND OTHER PROCEDURES THAT REQUIRE A SHARP SURGICAL BLADE TO PUNCTURE OR CUT.: Brand: BARD-PARKER ® CARBON RIB-BACK BLADES

## (undated) DEVICE — SYRINGE 20ML LL

## (undated) DEVICE — 1200CC GUARDIAN II: Brand: GUARDIAN

## (undated) DEVICE — BAG DECANTER

## (undated) DEVICE — SUT MONOCRYL 4-0 PS-2 18 IN Y496G

## (undated) DEVICE — SUT PROLENE 6-0 BV130 30 IN 8709H

## (undated) DEVICE — SUCTION 10FR DLP